# Patient Record
Sex: FEMALE | Race: WHITE | Employment: OTHER | URBAN - METROPOLITAN AREA
[De-identification: names, ages, dates, MRNs, and addresses within clinical notes are randomized per-mention and may not be internally consistent; named-entity substitution may affect disease eponyms.]

---

## 2017-01-06 ENCOUNTER — TELEPHONE (OUTPATIENT)
Dept: CARDIOLOGY CLINIC | Age: 82
End: 2017-01-06

## 2017-01-09 ENCOUNTER — OFFICE VISIT (OUTPATIENT)
Dept: FAMILY MEDICINE CLINIC | Age: 82
End: 2017-01-09

## 2017-01-09 VITALS
DIASTOLIC BLOOD PRESSURE: 72 MMHG | HEIGHT: 63 IN | WEIGHT: 184 LBS | BODY MASS INDEX: 32.6 KG/M2 | SYSTOLIC BLOOD PRESSURE: 144 MMHG | HEART RATE: 70 BPM

## 2017-01-09 DIAGNOSIS — M79.7 FIBROMYALGIA: ICD-10-CM

## 2017-01-09 DIAGNOSIS — J44.9 CHRONIC OBSTRUCTIVE PULMONARY DISEASE, UNSPECIFIED COPD TYPE (HCC): Primary | ICD-10-CM

## 2017-01-09 DIAGNOSIS — G89.4 CHRONIC PAIN SYNDROME: ICD-10-CM

## 2017-01-09 DIAGNOSIS — M54.16 LUMBAR RADICULOPATHY: ICD-10-CM

## 2017-01-09 DIAGNOSIS — G62.9 NEUROPATHY: ICD-10-CM

## 2017-01-09 DIAGNOSIS — M16.0 PRIMARY OSTEOARTHRITIS OF BOTH HIPS: ICD-10-CM

## 2017-01-09 DIAGNOSIS — I10 ESSENTIAL HYPERTENSION: ICD-10-CM

## 2017-01-09 DIAGNOSIS — Z23 NEED FOR INFLUENZA VACCINATION: ICD-10-CM

## 2017-01-09 DIAGNOSIS — M25.562 ACUTE PAIN OF LEFT KNEE: ICD-10-CM

## 2017-01-09 PROCEDURE — 99214 OFFICE O/P EST MOD 30 MIN: CPT | Performed by: NURSE PRACTITIONER

## 2017-01-09 PROCEDURE — G0008 ADMIN INFLUENZA VIRUS VAC: HCPCS | Performed by: NURSE PRACTITIONER

## 2017-01-09 PROCEDURE — 90688 IIV4 VACCINE SPLT 0.5 ML IM: CPT | Performed by: NURSE PRACTITIONER

## 2017-01-09 RX ORDER — OXYCODONE AND ACETAMINOPHEN 7.5; 325 MG/1; MG/1
1 TABLET ORAL EVERY 6 HOURS PRN
Qty: 60 TABLET | Refills: 0 | Status: SHIPPED | OUTPATIENT
Start: 2017-01-11 | End: 2017-04-07 | Stop reason: SDUPTHER

## 2017-01-09 RX ORDER — METOPROLOL SUCCINATE 100 MG/1
TABLET, EXTENDED RELEASE ORAL
Qty: 180 TABLET | Refills: 0 | Status: SHIPPED | OUTPATIENT
Start: 2017-01-09 | End: 2017-04-07 | Stop reason: SDUPTHER

## 2017-01-09 RX ORDER — OXYCODONE AND ACETAMINOPHEN 7.5; 325 MG/1; MG/1
1 TABLET ORAL EVERY 6 HOURS PRN
Qty: 60 TABLET | Refills: 0 | Status: SHIPPED | OUTPATIENT
Start: 2017-03-12 | End: 2017-04-07 | Stop reason: SDUPTHER

## 2017-01-09 RX ORDER — OXYCODONE HCL 10 MG/1
10 TABLET, FILM COATED, EXTENDED RELEASE ORAL EVERY 12 HOURS
Qty: 60 TABLET | Refills: 0 | Status: SHIPPED | OUTPATIENT
Start: 2017-02-11 | End: 2017-04-07 | Stop reason: SDUPTHER

## 2017-01-09 RX ORDER — ALBUTEROL SULFATE 90 UG/1
2 AEROSOL, METERED RESPIRATORY (INHALATION) EVERY 6 HOURS PRN
Qty: 1 INHALER | Refills: 2 | Status: SHIPPED | OUTPATIENT
Start: 2017-01-09 | End: 2018-03-13 | Stop reason: SDUPTHER

## 2017-01-09 RX ORDER — OXYCODONE AND ACETAMINOPHEN 7.5; 325 MG/1; MG/1
1 TABLET ORAL EVERY 6 HOURS PRN
Qty: 60 TABLET | Refills: 0 | Status: SHIPPED | OUTPATIENT
Start: 2017-02-10 | End: 2017-04-07 | Stop reason: SDUPTHER

## 2017-01-09 RX ORDER — LISINOPRIL 40 MG/1
20 TABLET ORAL DAILY
Qty: 90 TABLET | Refills: 0 | Status: SHIPPED | OUTPATIENT
Start: 2017-01-09 | End: 2017-04-07 | Stop reason: SDUPTHER

## 2017-01-09 RX ORDER — OXYCODONE HCL 10 MG/1
10 TABLET, FILM COATED, EXTENDED RELEASE ORAL EVERY 12 HOURS
Qty: 60 TABLET | Refills: 0 | Status: SHIPPED | OUTPATIENT
Start: 2017-01-11 | End: 2017-04-07 | Stop reason: SDUPTHER

## 2017-01-09 RX ORDER — OXYCODONE HCL 10 MG/1
10 TABLET, FILM COATED, EXTENDED RELEASE ORAL EVERY 12 HOURS
Qty: 60 TABLET | Refills: 0 | Status: SHIPPED | OUTPATIENT
Start: 2017-03-12 | End: 2017-04-07 | Stop reason: SDUPTHER

## 2017-01-09 ASSESSMENT — ENCOUNTER SYMPTOMS
SHORTNESS OF BREATH: 0
RHINORRHEA: 0
EYES NEGATIVE: 1
NAUSEA: 0
ABDOMINAL PAIN: 0
CHEST TIGHTNESS: 0
COUGH: 0
WHEEZING: 0
BACK PAIN: 1
CHOKING: 0
EYE ITCHING: 0
CONSTIPATION: 1
DIARRHEA: 0
ALLERGIC/IMMUNOLOGIC NEGATIVE: 1
PHOTOPHOBIA: 0
EYE DISCHARGE: 0
VOMITING: 0
SINUS PRESSURE: 0
EYE REDNESS: 0
SORE THROAT: 0
EYE PAIN: 0

## 2017-01-12 ENCOUNTER — TELEPHONE (OUTPATIENT)
Dept: CARDIOLOGY CLINIC | Age: 82
End: 2017-01-12

## 2017-01-12 DIAGNOSIS — I48.20 CHRONIC ATRIAL FIBRILLATION (HCC): ICD-10-CM

## 2017-01-12 LAB
INR BLD: 2.8 (ref 0.85–1.16)
PROTHROMBIN TIME: 32.6 SEC (ref 9.8–13)

## 2017-01-13 ENCOUNTER — OFFICE VISIT (OUTPATIENT)
Dept: ORTHOPEDIC SURGERY | Age: 82
End: 2017-01-13

## 2017-01-13 VITALS
HEIGHT: 63 IN | WEIGHT: 184 LBS | DIASTOLIC BLOOD PRESSURE: 84 MMHG | RESPIRATION RATE: 16 BRPM | BODY MASS INDEX: 32.6 KG/M2 | SYSTOLIC BLOOD PRESSURE: 138 MMHG | HEART RATE: 70 BPM

## 2017-01-13 DIAGNOSIS — S72.352A CLOSED DISPLACED COMMINUTED FRACTURE OF SHAFT OF LEFT FEMUR, INITIAL ENCOUNTER (HCC): ICD-10-CM

## 2017-01-13 DIAGNOSIS — M17.10 ARTHRITIS OF KNEE: Primary | ICD-10-CM

## 2017-01-13 PROCEDURE — G8419 CALC BMI OUT NRM PARAM NOF/U: HCPCS | Performed by: ORTHOPAEDIC SURGERY

## 2017-01-13 PROCEDURE — 4040F PNEUMOC VAC/ADMIN/RCVD: CPT | Performed by: ORTHOPAEDIC SURGERY

## 2017-01-13 PROCEDURE — 1123F ACP DISCUSS/DSCN MKR DOCD: CPT | Performed by: ORTHOPAEDIC SURGERY

## 2017-01-13 PROCEDURE — 99214 OFFICE O/P EST MOD 30 MIN: CPT | Performed by: ORTHOPAEDIC SURGERY

## 2017-01-13 PROCEDURE — G8484 FLU IMMUNIZE NO ADMIN: HCPCS | Performed by: ORTHOPAEDIC SURGERY

## 2017-01-13 PROCEDURE — 1090F PRES/ABSN URINE INCON ASSESS: CPT | Performed by: ORTHOPAEDIC SURGERY

## 2017-01-13 PROCEDURE — 1036F TOBACCO NON-USER: CPT | Performed by: ORTHOPAEDIC SURGERY

## 2017-01-13 PROCEDURE — G8400 PT W/DXA NO RESULTS DOC: HCPCS | Performed by: ORTHOPAEDIC SURGERY

## 2017-01-13 PROCEDURE — 73502 X-RAY EXAM HIP UNI 2-3 VIEWS: CPT | Performed by: ORTHOPAEDIC SURGERY

## 2017-01-13 PROCEDURE — 20610 DRAIN/INJ JOINT/BURSA W/O US: CPT | Performed by: ORTHOPAEDIC SURGERY

## 2017-01-13 PROCEDURE — G8428 CUR MEDS NOT DOCUMENT: HCPCS | Performed by: ORTHOPAEDIC SURGERY

## 2017-01-17 ENCOUNTER — TELEPHONE (OUTPATIENT)
Dept: CARDIOLOGY CLINIC | Age: 82
End: 2017-01-17

## 2017-01-17 ENCOUNTER — ANTI-COAG VISIT (OUTPATIENT)
Dept: CARDIOLOGY CLINIC | Age: 82
End: 2017-01-17

## 2017-02-14 ENCOUNTER — HOSPITAL ENCOUNTER (OUTPATIENT)
Dept: CT IMAGING | Age: 82
Discharge: HOME | End: 2017-02-14
Attending: INTERNAL MEDICINE | Admitting: INTERNAL MEDICINE

## 2017-02-14 DIAGNOSIS — K86.2 CYST OF PANCREAS: ICD-10-CM

## 2017-02-14 DIAGNOSIS — K86.3 CYST AND PSEUDOCYST OF PANCREAS: ICD-10-CM

## 2017-02-14 DIAGNOSIS — K86.2 CYST AND PSEUDOCYST OF PANCREAS: ICD-10-CM

## 2017-02-14 LAB
GFR AFRICAN AMERICAN: >60
GFR NON-AFRICAN AMERICAN: >60
PERFORMED ON: ABNORMAL
POC CREATININE: 0.5 MG/DL (ref 0.6–1.2)
POC SAMPLE TYPE: ABNORMAL

## 2017-02-23 DIAGNOSIS — I48.20 CHRONIC ATRIAL FIBRILLATION (HCC): ICD-10-CM

## 2017-02-24 ENCOUNTER — ANTI-COAG VISIT (OUTPATIENT)
Dept: CARDIOLOGY CLINIC | Age: 82
End: 2017-02-24

## 2017-02-24 ENCOUNTER — TELEPHONE (OUTPATIENT)
Dept: CARDIOLOGY CLINIC | Age: 82
End: 2017-02-24

## 2017-02-24 LAB
INR BLD: 2.06 (ref 0.85–1.15)
PROTHROMBIN TIME: 23.3 SEC (ref 9.6–13)

## 2017-03-17 ENCOUNTER — TELEPHONE (OUTPATIENT)
Dept: CARDIOLOGY CLINIC | Age: 82
End: 2017-03-17

## 2017-03-18 DIAGNOSIS — I48.20 CHRONIC ATRIAL FIBRILLATION (HCC): ICD-10-CM

## 2017-03-18 LAB
INR BLD: 1.73 (ref 0.85–1.15)
PROTHROMBIN TIME: 19.5 SEC (ref 9.6–13)

## 2017-03-20 ENCOUNTER — ANTI-COAG VISIT (OUTPATIENT)
Dept: CARDIOLOGY CLINIC | Age: 82
End: 2017-03-20

## 2017-03-20 ENCOUNTER — TELEPHONE (OUTPATIENT)
Dept: CARDIOLOGY CLINIC | Age: 82
End: 2017-03-20

## 2017-03-20 RX ORDER — WARFARIN SODIUM 5 MG/1
TABLET ORAL
Qty: 100 TABLET | Refills: 1 | Status: SHIPPED | OUTPATIENT
Start: 2017-03-20 | End: 2017-04-07 | Stop reason: SDUPTHER

## 2017-03-21 RX ORDER — WARFARIN SODIUM 5 MG/1
TABLET ORAL
Qty: 90 TABLET | Refills: 3 | Status: SHIPPED | OUTPATIENT
Start: 2017-03-21 | End: 2018-05-18 | Stop reason: SDUPTHER

## 2017-03-23 DIAGNOSIS — I48.20 CHRONIC ATRIAL FIBRILLATION (HCC): ICD-10-CM

## 2017-03-23 LAB
INR BLD: 2.28 (ref 0.85–1.15)
PROTHROMBIN TIME: 25.8 SEC (ref 9.6–13)

## 2017-03-24 ENCOUNTER — ANTI-COAG VISIT (OUTPATIENT)
Dept: CARDIOLOGY CLINIC | Age: 82
End: 2017-03-24

## 2017-03-24 ENCOUNTER — TELEPHONE (OUTPATIENT)
Dept: CARDIOLOGY CLINIC | Age: 82
End: 2017-03-24

## 2017-04-07 ENCOUNTER — OFFICE VISIT (OUTPATIENT)
Dept: FAMILY MEDICINE CLINIC | Age: 82
End: 2017-04-07

## 2017-04-07 VITALS
OXYGEN SATURATION: 97 % | SYSTOLIC BLOOD PRESSURE: 120 MMHG | HEART RATE: 70 BPM | DIASTOLIC BLOOD PRESSURE: 70 MMHG | BODY MASS INDEX: 32.78 KG/M2 | WEIGHT: 185 LBS | HEIGHT: 63 IN

## 2017-04-07 DIAGNOSIS — M25.511 CHRONIC PAIN OF BOTH SHOULDERS: ICD-10-CM

## 2017-04-07 DIAGNOSIS — G89.4 CHRONIC PAIN SYNDROME: ICD-10-CM

## 2017-04-07 DIAGNOSIS — I48.91 ATRIAL FIBRILLATION, UNSPECIFIED TYPE (HCC): Primary | ICD-10-CM

## 2017-04-07 DIAGNOSIS — M25.512 CHRONIC PAIN OF BOTH SHOULDERS: ICD-10-CM

## 2017-04-07 DIAGNOSIS — M79.7 FIBROMYALGIA: ICD-10-CM

## 2017-04-07 DIAGNOSIS — G89.29 CHRONIC PAIN OF BOTH SHOULDERS: ICD-10-CM

## 2017-04-07 DIAGNOSIS — I10 ESSENTIAL HYPERTENSION: ICD-10-CM

## 2017-04-07 DIAGNOSIS — M16.0 PRIMARY OSTEOARTHRITIS OF BOTH HIPS: ICD-10-CM

## 2017-04-07 DIAGNOSIS — G62.9 NEUROPATHY: ICD-10-CM

## 2017-04-07 DIAGNOSIS — M54.16 LUMBAR RADICULOPATHY: ICD-10-CM

## 2017-04-07 DIAGNOSIS — R73.02 IMPAIRED GLUCOSE TOLERANCE: ICD-10-CM

## 2017-04-07 LAB
A/G RATIO: 1.3 (ref 1.1–2.2)
ALBUMIN SERPL-MCNC: 3.9 G/DL (ref 3.4–5)
ALP BLD-CCNC: 65 U/L (ref 40–129)
ALT SERPL-CCNC: 10 U/L (ref 10–40)
ANION GAP SERPL CALCULATED.3IONS-SCNC: 17 MMOL/L (ref 3–16)
AST SERPL-CCNC: 15 U/L (ref 15–37)
BILIRUB SERPL-MCNC: 0.5 MG/DL (ref 0–1)
BUN BLDV-MCNC: 15 MG/DL (ref 7–20)
CALCIUM SERPL-MCNC: 9.6 MG/DL (ref 8.3–10.6)
CHLORIDE BLD-SCNC: 102 MMOL/L (ref 99–110)
CO2: 26 MMOL/L (ref 21–32)
CREAT SERPL-MCNC: 0.7 MG/DL (ref 0.6–1.2)
GFR AFRICAN AMERICAN: >60
GFR NON-AFRICAN AMERICAN: >60
GLOBULIN: 3 G/DL
GLUCOSE BLD-MCNC: 122 MG/DL (ref 70–99)
POTASSIUM SERPL-SCNC: 4.7 MMOL/L (ref 3.5–5.1)
SODIUM BLD-SCNC: 145 MMOL/L (ref 136–145)
TOTAL PROTEIN: 6.9 G/DL (ref 6.4–8.2)

## 2017-04-07 PROCEDURE — 1090F PRES/ABSN URINE INCON ASSESS: CPT | Performed by: NURSE PRACTITIONER

## 2017-04-07 PROCEDURE — G8417 CALC BMI ABV UP PARAM F/U: HCPCS | Performed by: NURSE PRACTITIONER

## 2017-04-07 PROCEDURE — 1123F ACP DISCUSS/DSCN MKR DOCD: CPT | Performed by: NURSE PRACTITIONER

## 2017-04-07 PROCEDURE — 1036F TOBACCO NON-USER: CPT | Performed by: NURSE PRACTITIONER

## 2017-04-07 PROCEDURE — 99214 OFFICE O/P EST MOD 30 MIN: CPT | Performed by: NURSE PRACTITIONER

## 2017-04-07 PROCEDURE — G8427 DOCREV CUR MEDS BY ELIG CLIN: HCPCS | Performed by: NURSE PRACTITIONER

## 2017-04-07 PROCEDURE — 4040F PNEUMOC VAC/ADMIN/RCVD: CPT | Performed by: NURSE PRACTITIONER

## 2017-04-07 PROCEDURE — 36415 COLL VENOUS BLD VENIPUNCTURE: CPT | Performed by: NURSE PRACTITIONER

## 2017-04-07 PROCEDURE — G8400 PT W/DXA NO RESULTS DOC: HCPCS | Performed by: NURSE PRACTITIONER

## 2017-04-07 RX ORDER — OXYCODONE AND ACETAMINOPHEN 7.5; 325 MG/1; MG/1
1 TABLET ORAL EVERY 6 HOURS PRN
Qty: 60 TABLET | Refills: 0 | Status: SHIPPED | OUTPATIENT
Start: 2017-06-10 | End: 2017-07-10 | Stop reason: SDUPTHER

## 2017-04-07 RX ORDER — OXYCODONE HCL 10 MG/1
10 TABLET, FILM COATED, EXTENDED RELEASE ORAL EVERY 12 HOURS
Qty: 60 TABLET | Refills: 0 | Status: SHIPPED | OUTPATIENT
Start: 2017-05-12 | End: 2017-05-10 | Stop reason: SDUPTHER

## 2017-04-07 RX ORDER — LISINOPRIL 40 MG/1
20 TABLET ORAL DAILY
Qty: 90 TABLET | Refills: 0 | Status: SHIPPED | OUTPATIENT
Start: 2017-04-07 | End: 2018-01-05 | Stop reason: SDUPTHER

## 2017-04-07 RX ORDER — OXYCODONE AND ACETAMINOPHEN 7.5; 325 MG/1; MG/1
1 TABLET ORAL EVERY 6 HOURS PRN
Qty: 60 TABLET | Refills: 0 | Status: SHIPPED | OUTPATIENT
Start: 2017-05-11 | End: 2017-05-10 | Stop reason: SDUPTHER

## 2017-04-07 RX ORDER — PREDNISONE 10 MG/1
TABLET ORAL
Qty: 10 TABLET | Refills: 0 | Status: SHIPPED | OUTPATIENT
Start: 2017-04-07 | End: 2017-05-10 | Stop reason: ALTCHOICE

## 2017-04-07 RX ORDER — METOPROLOL SUCCINATE 100 MG/1
TABLET, EXTENDED RELEASE ORAL
Qty: 180 TABLET | Refills: 0 | Status: SHIPPED | OUTPATIENT
Start: 2017-04-07 | End: 2017-11-03 | Stop reason: SDUPTHER

## 2017-04-07 RX ORDER — OXYCODONE HCL 10 MG/1
10 TABLET, FILM COATED, EXTENDED RELEASE ORAL EVERY 12 HOURS
Qty: 60 TABLET | Refills: 0 | Status: SHIPPED | OUTPATIENT
Start: 2017-04-12 | End: 2017-05-10 | Stop reason: SDUPTHER

## 2017-04-07 RX ORDER — OXYCODONE HCL 10 MG/1
10 TABLET, FILM COATED, EXTENDED RELEASE ORAL EVERY 12 HOURS
Qty: 60 TABLET | Refills: 0 | Status: SHIPPED | OUTPATIENT
Start: 2017-06-11 | End: 2017-07-10 | Stop reason: SDUPTHER

## 2017-04-07 RX ORDER — OXYCODONE AND ACETAMINOPHEN 7.5; 325 MG/1; MG/1
1 TABLET ORAL EVERY 6 HOURS PRN
Qty: 60 TABLET | Refills: 0 | Status: SHIPPED | OUTPATIENT
Start: 2017-04-11 | End: 2017-05-10 | Stop reason: SDUPTHER

## 2017-04-07 ASSESSMENT — ENCOUNTER SYMPTOMS
NAUSEA: 0
COUGH: 0
SHORTNESS OF BREATH: 0
PHOTOPHOBIA: 0
EYE DISCHARGE: 0
EYES NEGATIVE: 1
SORE THROAT: 0
EYE REDNESS: 0
CHEST TIGHTNESS: 0
RHINORRHEA: 0
SINUS PRESSURE: 0
CHOKING: 0
ALLERGIC/IMMUNOLOGIC NEGATIVE: 1
VOMITING: 0
BACK PAIN: 1
ABDOMINAL PAIN: 0
DIARRHEA: 0
EYE ITCHING: 0
EYE PAIN: 0
CONSTIPATION: 1
WHEEZING: 0

## 2017-04-08 LAB
ESTIMATED AVERAGE GLUCOSE: 122.6 MG/DL
HBA1C MFR BLD: 5.9 %

## 2017-04-10 ENCOUNTER — TELEPHONE (OUTPATIENT)
Dept: FAMILY MEDICINE CLINIC | Age: 82
End: 2017-04-10

## 2017-04-11 ENCOUNTER — TELEPHONE (OUTPATIENT)
Dept: FAMILY MEDICINE CLINIC | Age: 82
End: 2017-04-11

## 2017-04-11 RX ORDER — SULFAMETHOXAZOLE AND TRIMETHOPRIM 800; 160 MG/1; MG/1
1 TABLET ORAL 2 TIMES DAILY
Qty: 10 TABLET | Refills: 0 | Status: SHIPPED | OUTPATIENT
Start: 2017-04-11 | End: 2017-09-07 | Stop reason: SDUPTHER

## 2017-05-03 DIAGNOSIS — I48.20 CHRONIC ATRIAL FIBRILLATION (HCC): ICD-10-CM

## 2017-05-03 LAB
INR BLD: 3.11 (ref 0.85–1.15)
PROTHROMBIN TIME: 35.1 SEC (ref 9.6–13)

## 2017-05-05 ENCOUNTER — ANTI-COAG VISIT (OUTPATIENT)
Dept: CARDIOLOGY CLINIC | Age: 82
End: 2017-05-05

## 2017-05-05 ENCOUNTER — TELEPHONE (OUTPATIENT)
Dept: CARDIOLOGY CLINIC | Age: 82
End: 2017-05-05

## 2017-05-10 ENCOUNTER — OFFICE VISIT (OUTPATIENT)
Dept: CARDIOLOGY CLINIC | Age: 82
End: 2017-05-10

## 2017-05-10 VITALS
HEART RATE: 78 BPM | OXYGEN SATURATION: 98 % | BODY MASS INDEX: 32.96 KG/M2 | SYSTOLIC BLOOD PRESSURE: 118 MMHG | DIASTOLIC BLOOD PRESSURE: 70 MMHG | HEIGHT: 63 IN | WEIGHT: 186 LBS

## 2017-05-10 DIAGNOSIS — I48.91 ATRIAL FIBRILLATION, UNSPECIFIED TYPE (HCC): Primary | ICD-10-CM

## 2017-05-10 DIAGNOSIS — I10 ESSENTIAL HYPERTENSION: ICD-10-CM

## 2017-05-10 DIAGNOSIS — R06.02 SOB (SHORTNESS OF BREATH): ICD-10-CM

## 2017-05-10 PROCEDURE — 93000 ELECTROCARDIOGRAM COMPLETE: CPT | Performed by: INTERNAL MEDICINE

## 2017-05-10 PROCEDURE — 4040F PNEUMOC VAC/ADMIN/RCVD: CPT | Performed by: INTERNAL MEDICINE

## 2017-05-10 PROCEDURE — 1123F ACP DISCUSS/DSCN MKR DOCD: CPT | Performed by: INTERNAL MEDICINE

## 2017-05-10 PROCEDURE — 1036F TOBACCO NON-USER: CPT | Performed by: INTERNAL MEDICINE

## 2017-05-10 PROCEDURE — G8400 PT W/DXA NO RESULTS DOC: HCPCS | Performed by: INTERNAL MEDICINE

## 2017-05-10 PROCEDURE — 99215 OFFICE O/P EST HI 40 MIN: CPT | Performed by: INTERNAL MEDICINE

## 2017-05-10 PROCEDURE — G8427 DOCREV CUR MEDS BY ELIG CLIN: HCPCS | Performed by: INTERNAL MEDICINE

## 2017-05-10 PROCEDURE — 1090F PRES/ABSN URINE INCON ASSESS: CPT | Performed by: INTERNAL MEDICINE

## 2017-05-10 PROCEDURE — G8417 CALC BMI ABV UP PARAM F/U: HCPCS | Performed by: INTERNAL MEDICINE

## 2017-05-12 DIAGNOSIS — I48.20 CHRONIC ATRIAL FIBRILLATION (HCC): ICD-10-CM

## 2017-05-12 LAB
INR BLD: 1.24 (ref 0.85–1.15)
PROTHROMBIN TIME: 14 SEC (ref 9.6–13)

## 2017-05-16 ENCOUNTER — ANTI-COAG VISIT (OUTPATIENT)
Dept: CARDIOLOGY CLINIC | Age: 82
End: 2017-05-16

## 2017-05-16 ENCOUNTER — OFFICE VISIT (OUTPATIENT)
Dept: FAMILY MEDICINE CLINIC | Age: 82
End: 2017-05-16

## 2017-05-16 VITALS
DIASTOLIC BLOOD PRESSURE: 84 MMHG | HEART RATE: 100 BPM | HEIGHT: 63 IN | WEIGHT: 187 LBS | OXYGEN SATURATION: 95 % | BODY MASS INDEX: 33.13 KG/M2 | SYSTOLIC BLOOD PRESSURE: 136 MMHG

## 2017-05-16 DIAGNOSIS — Z78.0 ASYMPTOMATIC MENOPAUSAL STATE: ICD-10-CM

## 2017-05-16 DIAGNOSIS — Z12.11 SCREENING FOR COLORECTAL CANCER: ICD-10-CM

## 2017-05-16 DIAGNOSIS — S72.002G: ICD-10-CM

## 2017-05-16 DIAGNOSIS — Z00.00 MEDICARE ANNUAL WELLNESS VISIT, INITIAL: Primary | ICD-10-CM

## 2017-05-16 DIAGNOSIS — M16.0 PRIMARY OSTEOARTHRITIS OF BOTH HIPS: ICD-10-CM

## 2017-05-16 DIAGNOSIS — Z13.6 SCREENING FOR CARDIOVASCULAR CONDITION: ICD-10-CM

## 2017-05-16 DIAGNOSIS — Z12.31 ENCOUNTER FOR SCREENING MAMMOGRAM FOR BREAST CANCER: ICD-10-CM

## 2017-05-16 DIAGNOSIS — Z00.00 ROUTINE GENERAL MEDICAL EXAMINATION AT A HEALTH CARE FACILITY: ICD-10-CM

## 2017-05-16 DIAGNOSIS — Z12.12 SCREENING FOR COLORECTAL CANCER: ICD-10-CM

## 2017-05-16 DIAGNOSIS — Z13.1 SCREENING FOR DIABETES MELLITUS (DM): ICD-10-CM

## 2017-05-16 PROCEDURE — G0438 PPPS, INITIAL VISIT: HCPCS | Performed by: NURSE PRACTITIONER

## 2017-05-16 ASSESSMENT — ANXIETY QUESTIONNAIRES: GAD7 TOTAL SCORE: 0

## 2017-05-16 ASSESSMENT — PATIENT HEALTH QUESTIONNAIRE - PHQ9: SUM OF ALL RESPONSES TO PHQ QUESTIONS 1-9: 0

## 2017-05-16 ASSESSMENT — LIFESTYLE VARIABLES: HOW OFTEN DO YOU HAVE A DRINK CONTAINING ALCOHOL: 0

## 2017-05-23 DIAGNOSIS — I48.20 CHRONIC ATRIAL FIBRILLATION (HCC): ICD-10-CM

## 2017-05-23 LAB
INR BLD: 3.03 (ref 0.85–1.15)
PROTHROMBIN TIME: 34.2 SEC (ref 9.6–13)

## 2017-05-24 ENCOUNTER — ANTI-COAG VISIT (OUTPATIENT)
Dept: CARDIOLOGY CLINIC | Age: 82
End: 2017-05-24

## 2017-06-12 ENCOUNTER — TELEPHONE (OUTPATIENT)
Dept: CARDIOLOGY CLINIC | Age: 82
End: 2017-06-12

## 2017-06-12 DIAGNOSIS — I48.91 ATRIAL FIBRILLATION, UNSPECIFIED TYPE (HCC): Primary | ICD-10-CM

## 2017-06-12 DIAGNOSIS — I48.91 ATRIAL FIBRILLATION, UNSPECIFIED TYPE (HCC): ICD-10-CM

## 2017-06-12 LAB
INR BLD: 3.07 (ref 0.85–1.15)
PROTHROMBIN TIME: 34.7 SEC (ref 9.6–13)

## 2017-06-13 ENCOUNTER — ANTI-COAG VISIT (OUTPATIENT)
Dept: CARDIOLOGY CLINIC | Age: 82
End: 2017-06-13

## 2017-06-26 ENCOUNTER — TELEPHONE (OUTPATIENT)
Dept: FAMILY MEDICINE CLINIC | Age: 82
End: 2017-06-26

## 2017-07-10 ENCOUNTER — OFFICE VISIT (OUTPATIENT)
Dept: FAMILY MEDICINE CLINIC | Age: 82
End: 2017-07-10

## 2017-07-10 VITALS
HEART RATE: 86 BPM | DIASTOLIC BLOOD PRESSURE: 84 MMHG | HEIGHT: 63 IN | BODY MASS INDEX: 32.78 KG/M2 | WEIGHT: 185 LBS | SYSTOLIC BLOOD PRESSURE: 124 MMHG

## 2017-07-10 DIAGNOSIS — M16.0 PRIMARY OSTEOARTHRITIS OF BOTH HIPS: ICD-10-CM

## 2017-07-10 DIAGNOSIS — G62.9 NEUROPATHY: ICD-10-CM

## 2017-07-10 DIAGNOSIS — G89.4 CHRONIC PAIN SYNDROME: ICD-10-CM

## 2017-07-10 DIAGNOSIS — I50.32 CHF (CONGESTIVE HEART FAILURE), NYHA CLASS I, CHRONIC, DIASTOLIC (HCC): ICD-10-CM

## 2017-07-10 DIAGNOSIS — M54.16 LUMBAR RADICULOPATHY: ICD-10-CM

## 2017-07-10 DIAGNOSIS — I10 ESSENTIAL HYPERTENSION: ICD-10-CM

## 2017-07-10 DIAGNOSIS — M79.7 FIBROMYALGIA: ICD-10-CM

## 2017-07-10 PROCEDURE — 4040F PNEUMOC VAC/ADMIN/RCVD: CPT | Performed by: FAMILY MEDICINE

## 2017-07-10 PROCEDURE — 90670 PCV13 VACCINE IM: CPT | Performed by: FAMILY MEDICINE

## 2017-07-10 PROCEDURE — G8400 PT W/DXA NO RESULTS DOC: HCPCS | Performed by: FAMILY MEDICINE

## 2017-07-10 PROCEDURE — 99214 OFFICE O/P EST MOD 30 MIN: CPT | Performed by: FAMILY MEDICINE

## 2017-07-10 PROCEDURE — 1123F ACP DISCUSS/DSCN MKR DOCD: CPT | Performed by: FAMILY MEDICINE

## 2017-07-10 PROCEDURE — 1090F PRES/ABSN URINE INCON ASSESS: CPT | Performed by: FAMILY MEDICINE

## 2017-07-10 PROCEDURE — G8417 CALC BMI ABV UP PARAM F/U: HCPCS | Performed by: FAMILY MEDICINE

## 2017-07-10 PROCEDURE — G8427 DOCREV CUR MEDS BY ELIG CLIN: HCPCS | Performed by: FAMILY MEDICINE

## 2017-07-10 PROCEDURE — 1036F TOBACCO NON-USER: CPT | Performed by: FAMILY MEDICINE

## 2017-07-10 PROCEDURE — G0009 ADMIN PNEUMOCOCCAL VACCINE: HCPCS | Performed by: FAMILY MEDICINE

## 2017-07-10 RX ORDER — OXYCODONE AND ACETAMINOPHEN 7.5; 325 MG/1; MG/1
1 TABLET ORAL 2 TIMES DAILY
Qty: 60 TABLET | Refills: 0 | Status: SHIPPED | OUTPATIENT
Start: 2017-07-10 | End: 2017-10-02 | Stop reason: SDUPTHER

## 2017-07-10 RX ORDER — OXYCODONE HCL 10 MG/1
10 TABLET, FILM COATED, EXTENDED RELEASE ORAL EVERY 12 HOURS
Qty: 60 TABLET | Refills: 0 | Status: SHIPPED | OUTPATIENT
Start: 2017-09-09 | End: 2017-10-09

## 2017-07-10 RX ORDER — LISINOPRIL 40 MG/1
20 TABLET ORAL DAILY
Qty: 90 TABLET | Refills: 0 | Status: CANCELLED | OUTPATIENT
Start: 2017-07-10

## 2017-07-10 RX ORDER — OXYCODONE HCL 10 MG/1
10 TABLET, FILM COATED, EXTENDED RELEASE ORAL EVERY 12 HOURS
Qty: 60 TABLET | Refills: 0 | Status: SHIPPED | OUTPATIENT
Start: 2017-07-11 | End: 2017-10-02 | Stop reason: SDUPTHER

## 2017-07-10 RX ORDER — OXYCODONE HCL 10 MG/1
10 TABLET, FILM COATED, EXTENDED RELEASE ORAL EVERY 12 HOURS
Qty: 60 TABLET | Refills: 0 | Status: SHIPPED | OUTPATIENT
Start: 2017-08-10 | End: 2017-10-02 | Stop reason: SDUPTHER

## 2017-07-10 RX ORDER — WARFARIN SODIUM 5 MG/1
TABLET ORAL
Qty: 90 TABLET | Refills: 3 | Status: CANCELLED | OUTPATIENT
Start: 2017-07-10

## 2017-07-10 RX ORDER — METOPROLOL SUCCINATE 100 MG/1
TABLET, EXTENDED RELEASE ORAL
Qty: 180 TABLET | Refills: 0 | Status: CANCELLED | OUTPATIENT
Start: 2017-07-10

## 2017-07-10 RX ORDER — GABAPENTIN 300 MG/1
300 CAPSULE ORAL 3 TIMES DAILY
Qty: 90 CAPSULE | Refills: 3 | Status: SHIPPED | OUTPATIENT
Start: 2017-07-10 | End: 2017-08-17 | Stop reason: ALTCHOICE

## 2017-07-10 RX ORDER — OXYCODONE AND ACETAMINOPHEN 7.5; 325 MG/1; MG/1
1 TABLET ORAL 2 TIMES DAILY
Qty: 60 TABLET | Refills: 0 | Status: SHIPPED | OUTPATIENT
Start: 2017-07-10 | End: 2017-07-30

## 2017-07-10 RX ORDER — OXYCODONE AND ACETAMINOPHEN 7.5; 325 MG/1; MG/1
1 TABLET ORAL 2 TIMES DAILY
Qty: 60 TABLET | Refills: 0 | Status: SHIPPED | OUTPATIENT
Start: 2017-07-10 | End: 2017-08-09

## 2017-07-10 ASSESSMENT — ENCOUNTER SYMPTOMS
BACK PAIN: 1
CONSTIPATION: 1

## 2017-07-18 DIAGNOSIS — I48.91 ATRIAL FIBRILLATION, UNSPECIFIED TYPE (HCC): ICD-10-CM

## 2017-07-18 LAB
INR BLD: 2.61 (ref 0.85–1.15)
PROTHROMBIN TIME: 29.5 SEC (ref 9.6–13)

## 2017-07-19 ENCOUNTER — ANTI-COAG VISIT (OUTPATIENT)
Dept: CARDIOLOGY CLINIC | Age: 82
End: 2017-07-19

## 2017-07-19 ENCOUNTER — TELEPHONE (OUTPATIENT)
Dept: CARDIOLOGY CLINIC | Age: 82
End: 2017-07-19

## 2017-08-17 ENCOUNTER — OFFICE VISIT (OUTPATIENT)
Dept: FAMILY MEDICINE CLINIC | Age: 82
End: 2017-08-17

## 2017-08-17 VITALS
RESPIRATION RATE: 16 BRPM | WEIGHT: 189 LBS | OXYGEN SATURATION: 97 % | DIASTOLIC BLOOD PRESSURE: 72 MMHG | SYSTOLIC BLOOD PRESSURE: 144 MMHG | BODY MASS INDEX: 34.57 KG/M2 | HEART RATE: 90 BPM

## 2017-08-17 DIAGNOSIS — R45.89 DEPRESSED MOOD: ICD-10-CM

## 2017-08-17 DIAGNOSIS — J44.0 COPD (CHRONIC OBSTRUCTIVE PULMONARY DISEASE) WITH ACUTE BRONCHITIS (HCC): Primary | ICD-10-CM

## 2017-08-17 DIAGNOSIS — J20.9 COPD (CHRONIC OBSTRUCTIVE PULMONARY DISEASE) WITH ACUTE BRONCHITIS (HCC): Primary | ICD-10-CM

## 2017-08-17 PROCEDURE — G8427 DOCREV CUR MEDS BY ELIG CLIN: HCPCS | Performed by: FAMILY MEDICINE

## 2017-08-17 PROCEDURE — 1123F ACP DISCUSS/DSCN MKR DOCD: CPT | Performed by: FAMILY MEDICINE

## 2017-08-17 PROCEDURE — 3023F SPIROM DOC REV: CPT | Performed by: FAMILY MEDICINE

## 2017-08-17 PROCEDURE — 1090F PRES/ABSN URINE INCON ASSESS: CPT | Performed by: FAMILY MEDICINE

## 2017-08-17 PROCEDURE — 99213 OFFICE O/P EST LOW 20 MIN: CPT | Performed by: FAMILY MEDICINE

## 2017-08-17 PROCEDURE — 1036F TOBACCO NON-USER: CPT | Performed by: FAMILY MEDICINE

## 2017-08-17 PROCEDURE — 4040F PNEUMOC VAC/ADMIN/RCVD: CPT | Performed by: FAMILY MEDICINE

## 2017-08-17 PROCEDURE — G8926 SPIRO NO PERF OR DOC: HCPCS | Performed by: FAMILY MEDICINE

## 2017-08-17 PROCEDURE — G8417 CALC BMI ABV UP PARAM F/U: HCPCS | Performed by: FAMILY MEDICINE

## 2017-08-17 PROCEDURE — G8400 PT W/DXA NO RESULTS DOC: HCPCS | Performed by: FAMILY MEDICINE

## 2017-08-17 ASSESSMENT — ENCOUNTER SYMPTOMS
CHOKING: 0
SORE THROAT: 0
RHINORRHEA: 0
COUGH: 0
SINUS PRESSURE: 0
TROUBLE SWALLOWING: 0
CHEST TIGHTNESS: 0
WHEEZING: 0
SHORTNESS OF BREATH: 0

## 2017-08-30 ENCOUNTER — TELEPHONE (OUTPATIENT)
Dept: CARDIOLOGY CLINIC | Age: 82
End: 2017-08-30

## 2017-08-31 ENCOUNTER — ANTI-COAG VISIT (OUTPATIENT)
Dept: CARDIOLOGY CLINIC | Age: 82
End: 2017-08-31

## 2017-08-31 DIAGNOSIS — I48.91 ATRIAL FIBRILLATION, UNSPECIFIED TYPE (HCC): ICD-10-CM

## 2017-08-31 LAB
INR BLD: 2.21 (ref 0.85–1.15)
PROTHROMBIN TIME: 25 SEC (ref 9.6–13)

## 2017-09-07 ENCOUNTER — TELEPHONE (OUTPATIENT)
Dept: FAMILY MEDICINE CLINIC | Age: 82
End: 2017-09-07

## 2017-09-07 RX ORDER — SULFAMETHOXAZOLE AND TRIMETHOPRIM 800; 160 MG/1; MG/1
1 TABLET ORAL EVERY 12 HOURS
Qty: 14 TABLET | Refills: 0 | Status: SHIPPED | OUTPATIENT
Start: 2017-09-07 | End: 2017-09-14

## 2017-09-15 ENCOUNTER — HOSPITAL ENCOUNTER (OUTPATIENT)
Dept: WOMENS IMAGING | Age: 82
Discharge: HOME | End: 2017-09-15
Attending: FAMILY MEDICINE | Admitting: FAMILY MEDICINE

## 2017-09-15 DIAGNOSIS — Z12.31 ENCOUNTER FOR SCREENING MAMMOGRAM FOR BREAST CANCER: ICD-10-CM

## 2017-09-15 DIAGNOSIS — Z78.0 ASYMPTOMATIC MENOPAUSAL STATE: ICD-10-CM

## 2017-09-22 ENCOUNTER — TELEPHONE (OUTPATIENT)
Dept: FAMILY MEDICINE CLINIC | Age: 82
End: 2017-09-22

## 2017-09-29 ENCOUNTER — TELEPHONE (OUTPATIENT)
Dept: FAMILY MEDICINE CLINIC | Age: 82
End: 2017-09-29

## 2017-10-02 ENCOUNTER — OFFICE VISIT (OUTPATIENT)
Dept: FAMILY MEDICINE CLINIC | Age: 82
End: 2017-10-02

## 2017-10-02 VITALS
HEIGHT: 63 IN | BODY MASS INDEX: 32.96 KG/M2 | HEART RATE: 86 BPM | OXYGEN SATURATION: 95 % | WEIGHT: 186 LBS | DIASTOLIC BLOOD PRESSURE: 72 MMHG | SYSTOLIC BLOOD PRESSURE: 134 MMHG

## 2017-10-02 DIAGNOSIS — M16.0 PRIMARY OSTEOARTHRITIS OF BOTH HIPS: ICD-10-CM

## 2017-10-02 DIAGNOSIS — G62.9 NEUROPATHY: ICD-10-CM

## 2017-10-02 DIAGNOSIS — M54.16 LUMBAR RADICULOPATHY: ICD-10-CM

## 2017-10-02 DIAGNOSIS — I48.91 ATRIAL FIBRILLATION, UNSPECIFIED TYPE (HCC): ICD-10-CM

## 2017-10-02 DIAGNOSIS — M79.7 FIBROMYALGIA: ICD-10-CM

## 2017-10-02 DIAGNOSIS — G89.4 CHRONIC PAIN SYNDROME: ICD-10-CM

## 2017-10-02 LAB
INR BLD: 3.17 (ref 0.85–1.15)
PROTHROMBIN TIME: 35.8 SEC (ref 9.6–13)

## 2017-10-02 PROCEDURE — 4040F PNEUMOC VAC/ADMIN/RCVD: CPT | Performed by: FAMILY MEDICINE

## 2017-10-02 PROCEDURE — G8484 FLU IMMUNIZE NO ADMIN: HCPCS | Performed by: FAMILY MEDICINE

## 2017-10-02 PROCEDURE — G8427 DOCREV CUR MEDS BY ELIG CLIN: HCPCS | Performed by: FAMILY MEDICINE

## 2017-10-02 PROCEDURE — 1036F TOBACCO NON-USER: CPT | Performed by: FAMILY MEDICINE

## 2017-10-02 PROCEDURE — 99213 OFFICE O/P EST LOW 20 MIN: CPT | Performed by: FAMILY MEDICINE

## 2017-10-02 PROCEDURE — G8417 CALC BMI ABV UP PARAM F/U: HCPCS | Performed by: FAMILY MEDICINE

## 2017-10-02 PROCEDURE — 1090F PRES/ABSN URINE INCON ASSESS: CPT | Performed by: FAMILY MEDICINE

## 2017-10-02 PROCEDURE — G8400 PT W/DXA NO RESULTS DOC: HCPCS | Performed by: FAMILY MEDICINE

## 2017-10-02 PROCEDURE — 1123F ACP DISCUSS/DSCN MKR DOCD: CPT | Performed by: FAMILY MEDICINE

## 2017-10-02 RX ORDER — OXYCODONE HCL 10 MG/1
10 TABLET, FILM COATED, EXTENDED RELEASE ORAL EVERY 12 HOURS
Qty: 60 TABLET | Refills: 0 | Status: SHIPPED | OUTPATIENT
Start: 2017-12-07 | End: 2018-01-05 | Stop reason: SDUPTHER

## 2017-10-02 RX ORDER — OXYCODONE HCL 10 MG/1
10 TABLET, FILM COATED, EXTENDED RELEASE ORAL EVERY 12 HOURS
Qty: 60 TABLET | Refills: 0 | Status: SHIPPED | OUTPATIENT
Start: 2017-10-08 | End: 2018-01-05 | Stop reason: SDUPTHER

## 2017-10-02 RX ORDER — OXYCODONE AND ACETAMINOPHEN 7.5; 325 MG/1; MG/1
1 TABLET ORAL EVERY 6 HOURS PRN
Qty: 60 TABLET | Refills: 0 | Status: SHIPPED | OUTPATIENT
Start: 2017-10-08 | End: 2018-01-05 | Stop reason: SDUPTHER

## 2017-10-02 RX ORDER — ALBUTEROL SULFATE 90 UG/1
2 AEROSOL, METERED RESPIRATORY (INHALATION) EVERY 6 HOURS PRN
Qty: 1 INHALER | Refills: 3 | Status: SHIPPED | OUTPATIENT
Start: 2017-10-02 | End: 2019-01-04 | Stop reason: SDUPTHER

## 2017-10-02 RX ORDER — OXYCODONE AND ACETAMINOPHEN 7.5; 325 MG/1; MG/1
TABLET ORAL
COMMUNITY
Start: 2017-09-08 | End: 2018-03-13 | Stop reason: SDUPTHER

## 2017-10-02 RX ORDER — OXYCODONE HCL 10 MG/1
10 TABLET, FILM COATED, EXTENDED RELEASE ORAL EVERY 12 HOURS
Qty: 60 TABLET | Refills: 0 | Status: SHIPPED | OUTPATIENT
Start: 2017-11-07 | End: 2018-01-05 | Stop reason: SDUPTHER

## 2017-10-02 RX ORDER — OXYCODONE AND ACETAMINOPHEN 7.5; 325 MG/1; MG/1
1 TABLET ORAL 2 TIMES DAILY
Qty: 60 TABLET | Refills: 0 | Status: SHIPPED | OUTPATIENT
Start: 2017-12-07 | End: 2018-01-05 | Stop reason: SDUPTHER

## 2017-10-02 RX ORDER — OXYCODONE AND ACETAMINOPHEN 7.5; 325 MG/1; MG/1
1 TABLET ORAL 2 TIMES DAILY
Qty: 60 TABLET | Refills: 0 | Status: SHIPPED | OUTPATIENT
Start: 2017-11-07 | End: 2018-01-05 | Stop reason: SDUPTHER

## 2017-10-02 ASSESSMENT — ENCOUNTER SYMPTOMS: BACK PAIN: 1

## 2017-10-02 NOTE — PROGRESS NOTES
Subjective:      Patient ID: Mitesh Dow is a 80 y.o. female. HPIHere for monitoring of pain meds for multiple arthritic issues and neuropathy    Review of Systems   Genitourinary: Positive for difficulty urinating, frequency and urgency. Musculoskeletal: Positive for arthralgias, back pain and gait problem (walks with cane assist). Neurological: Positive for numbness. Objective:   Physical Exam   Constitutional: No distress. HENT:   Mouth/Throat: No oropharyngeal exudate. Eyes: Right eye exhibits no discharge. Left eye exhibits no discharge. Neck: No thyromegaly present. Cardiovascular: Normal rate. No murmur heard. Pulmonary/Chest: No respiratory distress. Abdominal: She exhibits no distension. There is no tenderness. Musculoskeletal: She exhibits tenderness (hip  shoulders and knees on palpatation). She exhibits no edema. Lymphadenopathy:     She has no cervical adenopathy. Assessment:      1. Chronic pain syndrome  oxyCODONE (OXYCONTIN) 10 MG extended release tablet    oxyCODONE (OXYCONTIN) 10 MG extended release tablet    oxyCODONE (OXYCONTIN) 10 MG extended release tablet    oxyCODONE-acetaminophen (PERCOCET) 7.5-325 MG per tablet    oxyCODONE-acetaminophen (PERCOCET) 7.5-325 MG per tablet    oxyCODONE-acetaminophen (PERCOCET) 7.5-325 MG per tablet   2. Primary osteoarthritis of both hips  oxyCODONE (OXYCONTIN) 10 MG extended release tablet    oxyCODONE (OXYCONTIN) 10 MG extended release tablet    oxyCODONE (OXYCONTIN) 10 MG extended release tablet    oxyCODONE-acetaminophen (PERCOCET) 7.5-325 MG per tablet    oxyCODONE-acetaminophen (PERCOCET) 7.5-325 MG per tablet    oxyCODONE-acetaminophen (PERCOCET) 7.5-325 MG per tablet   3.  Fibromyalgia  oxyCODONE (OXYCONTIN) 10 MG extended release tablet    oxyCODONE (OXYCONTIN) 10 MG extended release tablet    oxyCODONE (OXYCONTIN) 10 MG extended release tablet    oxyCODONE-acetaminophen (PERCOCET) 7.5-325 MG per tablet oxyCODONE-acetaminophen (PERCOCET) 7.5-325 MG per tablet    oxyCODONE-acetaminophen (PERCOCET) 7.5-325 MG per tablet   4. Lumbar radiculopathy  oxyCODONE (OXYCONTIN) 10 MG extended release tablet    oxyCODONE (OXYCONTIN) 10 MG extended release tablet    oxyCODONE (OXYCONTIN) 10 MG extended release tablet    oxyCODONE-acetaminophen (PERCOCET) 7.5-325 MG per tablet    oxyCODONE-acetaminophen (PERCOCET) 7.5-325 MG per tablet    oxyCODONE-acetaminophen (PERCOCET) 7.5-325 MG per tablet   5.  Neuropathy (HCC)  oxyCODONE (OXYCONTIN) 10 MG extended release tablet    oxyCODONE (OXYCONTIN) 10 MG extended release tablet    oxyCODONE (OXYCONTIN) 10 MG extended release tablet    oxyCODONE-acetaminophen (PERCOCET) 7.5-325 MG per tablet    oxyCODONE-acetaminophen (PERCOCET) 7.5-325 MG per tablet    oxyCODONE-acetaminophen (PERCOCET) 7.5-325 MG per tablet           Plan:      Tana King script given 9/16/2017 and call if still with frequency

## 2017-10-03 ENCOUNTER — ANTI-COAG VISIT (OUTPATIENT)
Dept: CARDIOLOGY CLINIC | Age: 82
End: 2017-10-03

## 2017-10-05 PROBLEM — E66.01 MORBID OBESITY DUE TO EXCESS CALORIES (HCC): Status: ACTIVE | Noted: 2017-10-05

## 2017-10-11 ENCOUNTER — CARE COORDINATION (OUTPATIENT)
Dept: CASE MANAGEMENT | Age: 82
End: 2017-10-11

## 2017-10-19 ENCOUNTER — TELEPHONE (OUTPATIENT)
Dept: FAMILY MEDICINE CLINIC | Age: 82
End: 2017-10-19

## 2017-10-19 NOTE — TELEPHONE ENCOUNTER
----- Message from Davis Osei, [address]  sent at 10/10/2017  9:10 AM EDT -----  Patient needs hospital follow up within 2 weeks.   ----- Message -----  From: Shanice Gaitan MD  Sent: 10/7/2017   4:19 PM  To: Davis Osei, CNP

## 2017-10-19 NOTE — TELEPHONE ENCOUNTER
Needs f/u appt from \[facility]\"", told to call for appt with Ember Huang on 10/30/17 or 10/31/17.

## 2017-10-23 ENCOUNTER — TELEPHONE (OUTPATIENT)
Dept: ORTHOPEDIC SURGERY | Age: 82
End: 2017-10-23

## 2017-10-23 DIAGNOSIS — I48.91 ATRIAL FIBRILLATION, UNSPECIFIED TYPE (HCC): ICD-10-CM

## 2017-10-23 LAB
INR BLD: 1.6 (ref 0.85–1.15)
PROTHROMBIN TIME: 18.1 SEC (ref 9.6–13)

## 2017-10-23 NOTE — TELEPHONE ENCOUNTER
Pt is calling because she is calling to get info on where to get a pair of shoes that her insurance will pay for pls call pt to advise [phone]

## 2017-10-24 ENCOUNTER — TELEPHONE (OUTPATIENT)
Dept: PHARMACY | Facility: CLINIC | Age: 82
End: 2017-10-24

## 2017-10-24 ENCOUNTER — TELEPHONE (OUTPATIENT)
Dept: CARDIOLOGY CLINIC | Age: 82
End: 2017-10-24

## 2017-10-27 ENCOUNTER — ANTI-COAG VISIT (OUTPATIENT)
Dept: PHARMACY | Facility: CLINIC | Age: 82
End: 2017-10-27

## 2017-10-27 LAB
INR BLD: 2.9
PROTIME: 35.1 SECONDS

## 2017-10-27 NOTE — PROGRESS NOTES
Patient is new to our clinic. Two booklets \"Understanding your Coumadin therapy\" and  \"A heart-to-heart about A-Fib and stroke risk\" was provided and extensive education was given including but not limited to the following:     Indication and duration for therapy: A-FIB/indefinite   Dosing instructions: takes dose in evening/ has 5 mg (pink) tablets at home   INR goal: 2-3   Importance of INR monitoring   Avoid NSAIDs   Avoid alcohol: does not drink   Call with any medication changes including OTCs (starting, stopping, dose changes) and especially antibiotics and oral steroids   Protocol/prevention of falls   Vitamin K interaction and the importance of a consistent diet with regards to Vitamin K: only eats greens upon occasion   Signs and symptoms of bleeding and clotting   Non-somker   No MVI or supplements    Reviewed the after hours contact process   Call with any new medications/changes especially ABX and oral Steroids    Patient presents to clinic for teaching and INR. States she is doing well and has no complaints at this time. She has been on warfarin for > than 5 years. She has prescriptions filled at [facility]. She has a recent hospital admission where she was on ABX and oral steroids which caused problems with her INR and diabetes. Does have bruising on her arms and she states that is normal for her on this medication. She takes her Percocet and Oxycontin routinely. She states she did miss a dose of warfarin prior to her last INR = 1.6 and that was why it was so low. Continued her routine dosing of 5 mg daily except 7.5 mg on Sunday and Wednesday. Instructed patient she needed to take her 7.5 mg doses on scheduled days of the week, not randomly as she has in the past.  Patient verbalized understanding and has no questions at this time. Reviewed medication profile and adjusted warfarin dosing. Patient to be seen again in one week.

## 2017-11-03 ENCOUNTER — ANTI-COAG VISIT (OUTPATIENT)
Dept: PHARMACY | Facility: CLINIC | Age: 82
End: 2017-11-03

## 2017-11-03 DIAGNOSIS — I10 ESSENTIAL HYPERTENSION: ICD-10-CM

## 2017-11-03 LAB
INR BLD: 3.9
PROTIME: 46.6 SECONDS

## 2017-11-03 RX ORDER — METOPROLOL SUCCINATE 100 MG/1
TABLET, EXTENDED RELEASE ORAL
Qty: 60 TABLET | Refills: 0 | Status: SHIPPED | OUTPATIENT
Start: 2017-11-03 | End: 2017-11-03 | Stop reason: SDUPTHER

## 2017-11-03 RX ORDER — METOPROLOL SUCCINATE 100 MG/1
TABLET, EXTENDED RELEASE ORAL
Qty: 180 TABLET | Refills: 0 | Status: SHIPPED | OUTPATIENT
Start: 2017-11-03 | End: 2018-01-05 | Stop reason: SDUPTHER

## 2017-11-03 NOTE — PROGRESS NOTES
Ms. Enrique Barrientos is a 80 y.o.  female with history of A-Fib with RVR who presents today for anticoagulation monitoring and adjustment. Patient verifies current dosing regimen. Patient denies s/s bleeding/bruising/swelling/SOB. No blood in urine or stool. No dietary changes. No changes in medication/OTC agents/Herbals. No change in alcohol use. No missed doses. No Procedures scheduled in the future at this time. Lab Results   Component Value Date    INR 3.9 11/03/2017    INR 2.9 10/27/2017    INR 1.60 (H) 10/23/2017     Patient states doing well. No complaints at this time. She states this dose usually work well for her. Held does today 11-3-17, then continued warfarin 5 mg daily except 7.5 mg on Sunday and Wednesday. To return in 7 days. IF INR still elevated will reduce dose. Patient reminded to call the Anticoagulation Clinic with any signs or symptoms of bleeding or with any medication changes. Patient given instructions utilizing the teach back method. After visit summary printed and reviewed with patient.

## 2017-11-08 ENCOUNTER — HOSPITAL ENCOUNTER (OUTPATIENT)
Dept: NON INVASIVE DIAGNOSTICS | Age: 82
Discharge: HOME | End: 2017-11-08
Attending: INTERNAL MEDICINE | Admitting: INTERNAL MEDICINE

## 2017-11-08 DIAGNOSIS — I10 ESSENTIAL HYPERTENSION: ICD-10-CM

## 2017-11-08 DIAGNOSIS — I48.91 ATRIAL FIBRILLATION, UNSPECIFIED TYPE (HCC): ICD-10-CM

## 2017-11-08 DIAGNOSIS — R06.02 SOB (SHORTNESS OF BREATH): ICD-10-CM

## 2017-11-08 DIAGNOSIS — I48.91 ATRIAL FIBRILLATION (HCC): ICD-10-CM

## 2017-11-08 LAB
LV EF: 63 %
LVEF MODALITY: NORMAL

## 2017-11-10 ENCOUNTER — ANTI-COAG VISIT (OUTPATIENT)
Dept: PHARMACY | Facility: CLINIC | Age: 82
End: 2017-11-10

## 2017-11-10 LAB
INR BLD: 1.6
PROTIME: 19.5 SECONDS

## 2017-11-10 NOTE — PROGRESS NOTES
Ms. Thom Miller is a 80 y.o.  female with history of A-Fib with RVR who presents today for anticoagulation monitoring and adjustment. Patient verifies current dosing regimen. Patient denies s/s bleeding/bruising/swelling/SOB. No blood in urine or stool. No dietary changes. No changes in medication/OTC agents/Herbals. No change in alcohol use. No missed doses. No Procedures scheduled in the future at this time. Lab Results   Component Value Date    INR 1.6 11/10/2017    INR 3.9 11/03/2017    INR 2.9 10/27/2017       Patient states doing well. No complaints at this time. She states she was on Bactrim DS at some time within the last month but is unsure of date. That would explain the 3.9 INR from last week. She states her INR gets out of goal range easily when she is on any antibiotics. She was discharged with  Prednisone at the beginning of the month. Gave a 7.5 mg bolus today then resumed routine warfarin dosing of 5 mg daily except 7.5 mg on Sunday and Wednesday. To be seen again in one week. Patient reminded to call the Anticoagulation Clinic with any signs or symptoms of bleeding or with any medication changes. Patient given instructions utilizing the teach back method. After visit summary printed and reviewed with patient.

## 2017-11-17 ENCOUNTER — ANTI-COAG VISIT (OUTPATIENT)
Dept: PHARMACY | Facility: CLINIC | Age: 82
End: 2017-11-17

## 2017-11-17 LAB — INR BLD: 2.2

## 2017-12-15 ENCOUNTER — ANTI-COAG VISIT (OUTPATIENT)
Dept: PHARMACY | Facility: CLINIC | Age: 82
End: 2017-12-15

## 2017-12-15 LAB — INTERNATIONAL NORMALIZATION RATIO, POC: 3.9

## 2017-12-15 NOTE — PROGRESS NOTES
Ms. Enrique Barrientos is a 80 y.o.  female with history of A-Fib with rapid ventricular reponse  who presents today for anticoagulation monitoring and adjustment. Patient verifies current dosing regimen  Patient denies s/s bleeding/bruising/swelling/SOB  No blood in urine or stool. No dietary changes. No changes in medication/OTC agents/Herbals. No change in alcohol use. No missed doses. No Procedures scheduled in the future at this time. Lab Results   Component Value Date    INR 3.9 12/15/2017    INR 2.2 11/17/2017    INR 1.6 11/10/2017       Patient states not feeling well. States she is always in A-Fib and has CHF, feels tired today. Asked if she monitors her weight due to the CHF and she does not want to elaborate. Held warfarin today due to INR of 3.9. She does not eat vitamin K greens. Patient to continue warfarin 5 mg daily except 7.5 mg on Sunday and Wednesday. To see patient in two weeks. Patient reminded to call the Anticoagulation Clinic with any signs or symptoms of bleeding or with any medication changes.   Patient given instructions utilizing the teach back method.     After visit summary printed and reviewed with patient.

## 2017-12-29 ENCOUNTER — ANTI-COAG VISIT (OUTPATIENT)
Dept: PHARMACY | Facility: CLINIC | Age: 82
End: 2017-12-29

## 2017-12-29 LAB — INTERNATIONAL NORMALIZATION RATIO, POC: 3.2

## 2017-12-29 NOTE — PROGRESS NOTES
Ms. Sidney Gaspar is a 80 y.o.  female with history of A-FIb with RVR who presents today for anticoagulation monitoring and adjustment. Patient verifies current dosing regimen  Patient denies s/s bleeding/bruising/swelling/SOB  No blood in urine or stool. No dietary changes. No changes in medication/OTC agents/Herbals. No change in alcohol use. No missed doses. No Procedures scheduled in the future at this time. Lab Results   Component Value Date    INR 3.2 12/29/2017    INR 3.9 12/15/2017    INR 2.2 11/17/2017       Patient states doing well. No complaints at this time. Decreased weekly warfarin dosing by 6 % (2.5 mg/week) to 5 mg daily except 7.5 mg on Sunday. Patient states she does not know how long she will be able to continue to come to the clinic due to transportation issues. Have her scheduled for three weeks. After visit summary printed and reviewed with patient.

## 2018-01-05 ENCOUNTER — OFFICE VISIT (OUTPATIENT)
Dept: FAMILY MEDICINE CLINIC | Age: 83
End: 2018-01-05

## 2018-01-05 VITALS
SYSTOLIC BLOOD PRESSURE: 140 MMHG | HEIGHT: 63 IN | OXYGEN SATURATION: 99 % | DIASTOLIC BLOOD PRESSURE: 76 MMHG | TEMPERATURE: 98 F | BODY MASS INDEX: 32.6 KG/M2 | HEART RATE: 80 BPM | WEIGHT: 184 LBS

## 2018-01-05 DIAGNOSIS — I10 ESSENTIAL HYPERTENSION: Primary | ICD-10-CM

## 2018-01-05 DIAGNOSIS — M79.7 FIBROMYALGIA: ICD-10-CM

## 2018-01-05 DIAGNOSIS — G89.4 CHRONIC PAIN SYNDROME: ICD-10-CM

## 2018-01-05 DIAGNOSIS — G62.9 NEUROPATHY: ICD-10-CM

## 2018-01-05 DIAGNOSIS — M54.16 LUMBAR RADICULOPATHY: ICD-10-CM

## 2018-01-05 DIAGNOSIS — Z79.899 LONG TERM USE OF DRUG: ICD-10-CM

## 2018-01-05 DIAGNOSIS — M16.0 PRIMARY OSTEOARTHRITIS OF BOTH HIPS: ICD-10-CM

## 2018-01-05 LAB
AMPHETAMINE SCREEN, URINE: NORMAL
BARBITURATE SCREEN, URINE: NORMAL
BENZODIAZEPINE SCREEN, URINE: NORMAL
COCAINE METABOLITE SCREEN URINE: NORMAL
MDMA URINE: NORMAL
METHADONE SCREEN, URINE: NORMAL
METHAMPHETAMINE, URINE: NORMAL
OPIATE SCREEN URINE: NORMAL
OXYCODONE SCREEN URINE: NORMAL
PHENCYCLIDINE SCREEN URINE: NORMAL
PROPOXYPHENE SCREEN, URINE: NORMAL
THC: NORMAL
TRICYCLIC ANTIDEPRESSANTS, UR: NORMAL

## 2018-01-05 PROCEDURE — G8598 ASA/ANTIPLAT THER USED: HCPCS | Performed by: NURSE PRACTITIONER

## 2018-01-05 PROCEDURE — G8484 FLU IMMUNIZE NO ADMIN: HCPCS | Performed by: NURSE PRACTITIONER

## 2018-01-05 PROCEDURE — 1123F ACP DISCUSS/DSCN MKR DOCD: CPT | Performed by: NURSE PRACTITIONER

## 2018-01-05 PROCEDURE — 1090F PRES/ABSN URINE INCON ASSESS: CPT | Performed by: NURSE PRACTITIONER

## 2018-01-05 PROCEDURE — 80305 DRUG TEST PRSMV DIR OPT OBS: CPT | Performed by: NURSE PRACTITIONER

## 2018-01-05 PROCEDURE — 1036F TOBACCO NON-USER: CPT | Performed by: NURSE PRACTITIONER

## 2018-01-05 PROCEDURE — G8427 DOCREV CUR MEDS BY ELIG CLIN: HCPCS | Performed by: NURSE PRACTITIONER

## 2018-01-05 PROCEDURE — G8400 PT W/DXA NO RESULTS DOC: HCPCS | Performed by: NURSE PRACTITIONER

## 2018-01-05 PROCEDURE — 99214 OFFICE O/P EST MOD 30 MIN: CPT | Performed by: NURSE PRACTITIONER

## 2018-01-05 PROCEDURE — G8417 CALC BMI ABV UP PARAM F/U: HCPCS | Performed by: NURSE PRACTITIONER

## 2018-01-05 PROCEDURE — 4040F PNEUMOC VAC/ADMIN/RCVD: CPT | Performed by: NURSE PRACTITIONER

## 2018-01-05 RX ORDER — OXYCODONE AND ACETAMINOPHEN 7.5; 325 MG/1; MG/1
1 TABLET ORAL EVERY 6 HOURS PRN
Qty: 60 TABLET | Refills: 0 | Status: SHIPPED | OUTPATIENT
Start: 2018-01-06 | End: 2018-04-03 | Stop reason: SDUPTHER

## 2018-01-05 RX ORDER — OXYCODONE HCL 10 MG/1
10 TABLET, FILM COATED, EXTENDED RELEASE ORAL EVERY 12 HOURS
Qty: 60 TABLET | Refills: 0 | Status: SHIPPED | OUTPATIENT
Start: 2018-02-13 | End: 2018-01-05 | Stop reason: SDUPTHER

## 2018-01-05 RX ORDER — LISINOPRIL 20 MG/1
20 TABLET ORAL DAILY
Qty: 90 TABLET | Refills: 1 | Status: SHIPPED | OUTPATIENT
Start: 2018-01-05 | End: 2018-08-27 | Stop reason: SDUPTHER

## 2018-01-05 RX ORDER — OXYCODONE HCL 10 MG/1
10 TABLET, FILM COATED, EXTENDED RELEASE ORAL EVERY 12 HOURS
Qty: 60 TABLET | Refills: 0 | Status: SHIPPED | OUTPATIENT
Start: 2018-02-13 | End: 2018-03-13 | Stop reason: SDUPTHER

## 2018-01-05 RX ORDER — OXYCODONE HCL 10 MG/1
10 TABLET, FILM COATED, EXTENDED RELEASE ORAL EVERY 12 HOURS
Qty: 60 TABLET | Refills: 0 | Status: CANCELLED | OUTPATIENT
Start: 2018-02-13 | End: 2018-03-15

## 2018-01-05 RX ORDER — OXYCODONE HCL 10 MG/1
10 TABLET, FILM COATED, EXTENDED RELEASE ORAL EVERY 12 HOURS
Qty: 60 TABLET | Refills: 0 | Status: SHIPPED | OUTPATIENT
Start: 2018-03-15 | End: 2018-04-03 | Stop reason: SDUPTHER

## 2018-01-05 RX ORDER — OXYCODONE AND ACETAMINOPHEN 7.5; 325 MG/1; MG/1
1 TABLET ORAL 2 TIMES DAILY
Qty: 60 TABLET | Refills: 0 | Status: SHIPPED | OUTPATIENT
Start: 2018-02-05 | End: 2018-04-03 | Stop reason: SDUPTHER

## 2018-01-05 RX ORDER — OXYCODONE HCL 10 MG/1
10 TABLET, FILM COATED, EXTENDED RELEASE ORAL EVERY 12 HOURS
Qty: 60 TABLET | Refills: 0 | Status: SHIPPED | OUTPATIENT
Start: 2018-01-14 | End: 2018-04-03 | Stop reason: SDUPTHER

## 2018-01-05 RX ORDER — OXYCODONE AND ACETAMINOPHEN 7.5; 325 MG/1; MG/1
1 TABLET ORAL 2 TIMES DAILY
Qty: 60 TABLET | Refills: 0 | Status: SHIPPED | OUTPATIENT
Start: 2018-03-07 | End: 2018-04-03 | Stop reason: SDUPTHER

## 2018-01-05 RX ORDER — METOPROLOL SUCCINATE 100 MG/1
TABLET, EXTENDED RELEASE ORAL
Qty: 180 TABLET | Refills: 0 | Status: SHIPPED | OUTPATIENT
Start: 2018-01-05 | End: 2018-02-05 | Stop reason: SDUPTHER

## 2018-01-05 ASSESSMENT — ENCOUNTER SYMPTOMS
CONSTIPATION: 1
PHOTOPHOBIA: 0
NAUSEA: 0
VOMITING: 0
EYE ITCHING: 0
CHOKING: 0
CHEST TIGHTNESS: 0
ALLERGIC/IMMUNOLOGIC NEGATIVE: 1
EYE DISCHARGE: 0
EYE PAIN: 0
DIARRHEA: 0
BACK PAIN: 1
SORE THROAT: 0
SHORTNESS OF BREATH: 0
EYES NEGATIVE: 1
SINUS PRESSURE: 0
WHEEZING: 0
COUGH: 0
ABDOMINAL PAIN: 0
EYE REDNESS: 0
RHINORRHEA: 0

## 2018-01-05 NOTE — PROGRESS NOTES
Subjective:      Patient ID: Jose Slade is a 80 y.o. female. HPI    Patient with a history of HTN, GERD, COPD, fibromyalgia, chronic low back and hip pain and a-fib is here for follow up and refills. She is complaining of bilateral shoulder pain, worse on the right. She has difficulty lifting her shoulders above 80 degrees. No recent injury, but she has a history of history of arthritis in her shoulder and has had a right shoulder arthroscopy. These symptoms are unchanged. She has been out of percocet for three days and is not due to refill until tomorrow. She admits that on \"really bad\" pain days, she takes the medication 3 times instead of 2 times. She tries to make up for it by taking only one on \"really good\" days, but ran short this month. She is aware that I will not increase her dosing. We discussed her increased fall risk. She states that she mostly falls \"because of my cats\". She has not been taking lisinopril (she just \"stopped\" it), but is advised that she needs to be taking it for her blood pressure. She will restart. Past Medical History:   Diagnosis Date    A fib     Arthritis     [facility]     Coronary artery disease     Femur fracture ([address] 01/2016    left    Fibromyalgia     GERD (gastroesophageal reflux disease)     History of shingles     Hypertension     PONV (postoperative nausea and vomiting)     Rotator cuff disorder     left     Ulcer of gastroesophageal junction 2006       Review of Systems   Constitutional: Negative for activity change, appetite change, chills, diaphoresis, fatigue and fever. HENT: Negative. Negative for congestion, ear pain, rhinorrhea, sinus pressure, sneezing and sore throat. Eyes: Negative. Negative for photophobia, pain, discharge, redness, itching and visual disturbance. Respiratory: Negative for cough, choking, chest tightness, shortness of breath and wheezing.     Cardiovascular: Negative. Negative for chest pain, palpitations and leg swelling. Paroxysmal Atrial fibrillation   Gastrointestinal: Positive for constipation (due to narcotics>>managed with prn miralax). Negative for abdominal pain, diarrhea, nausea and vomiting. Endocrine: Negative. Negative for heat intolerance. Genitourinary: Negative. Negative for difficulty urinating, flank pain, frequency, hematuria and urgency. Musculoskeletal: Positive for arthralgias, back pain, gait problem and myalgias. Negative for joint swelling, neck pain and neck stiffness. Bilateral shoulder pain (R>L)    Fibromyalgia    Chronic left hip pain       Skin: Negative. Allergic/Immunologic: Negative. Neurological: Negative for light-headedness and headaches. Hematological: Negative. Psychiatric/Behavioral: Negative. Negative for agitation, behavioral problems, confusion, decreased concentration, dysphoric mood, self-injury and suicidal ideas. The patient is not nervous/anxious. Objective:   Physical Exam   Constitutional: She is oriented to person, place, and time. She appears well-developed and well-nourished. No distress. HENT:   Head: Normocephalic and atraumatic. Eyes: Conjunctivae are normal. Pupils are equal, round, and reactive to light. No scleral icterus. Neck: Normal range of motion. Neck supple. No tracheal deviation present. No bruits     Cardiovascular: Normal rate, regular rhythm and normal heart sounds. Regular rate; irregular rhythm. Pulmonary/Chest: Effort normal and breath sounds normal. No respiratory distress. She has no wheezes. She has no rales. Abdominal: Soft. She exhibits no distension and no mass. There is no tenderness. There is no guarding. Musculoskeletal: She exhibits tenderness. She exhibits no edema. She walks with a cane or walker    OA changes noted in bilateral hands. Right shoulder is tender and there is decrease ROM in both shoulders.    Neurological: She

## 2018-01-19 ENCOUNTER — ANTI-COAG VISIT (OUTPATIENT)
Dept: PHARMACY | Facility: CLINIC | Age: 83
End: 2018-01-19

## 2018-01-19 LAB — INTERNATIONAL NORMALIZATION RATIO, POC: 3.7

## 2018-02-05 DIAGNOSIS — I10 ESSENTIAL HYPERTENSION: ICD-10-CM

## 2018-02-05 RX ORDER — METOPROLOL SUCCINATE 100 MG/1
TABLET, EXTENDED RELEASE ORAL
Qty: 180 TABLET | Refills: 3 | Status: ON HOLD | OUTPATIENT
Start: 2018-02-05 | End: 2018-06-13 | Stop reason: HOSPADM

## 2018-02-05 NOTE — TELEPHONE ENCOUNTER
Medication Refill    When was your last appointment with cardiology?  5/10/17  (if 1year or longer, please schedule an appointment) 3/12/18    Medication needing refilled: metoprolol succinate (TOPROL XL)    Doseage of the medication: 100 MG extended release tablet    How are you taking this medication (QD, BID, TID, QID, PRN): TAKE ONE TABLET BY MOUTH TWICE A DAY    Patient want a 30 or 90 day supply called in: 90 days    Which Pharmacy are we sending the medication to: [facility] [address] [facility], [address] [place] [address]  [phone] - F [phone]

## 2018-02-16 ENCOUNTER — ANTI-COAG VISIT (OUTPATIENT)
Dept: PHARMACY | Facility: CLINIC | Age: 83
End: 2018-02-16

## 2018-02-16 DIAGNOSIS — I48.91 ATRIAL FIBRILLATION WITH RAPID VENTRICULAR RESPONSE (HCC): ICD-10-CM

## 2018-02-16 LAB — INTERNATIONAL NORMALIZATION RATIO, POC: 2.9

## 2018-03-06 ENCOUNTER — ANTI-COAG VISIT (OUTPATIENT)
Dept: PHARMACY | Facility: CLINIC | Age: 83
End: 2018-03-06

## 2018-03-06 DIAGNOSIS — I48.91 ATRIAL FIBRILLATION WITH RAPID VENTRICULAR RESPONSE (HCC): ICD-10-CM

## 2018-03-06 LAB — INTERNATIONAL NORMALIZATION RATIO, POC: 1.6

## 2018-03-13 ENCOUNTER — OFFICE VISIT (OUTPATIENT)
Dept: CARDIOLOGY CLINIC | Age: 83
End: 2018-03-13

## 2018-03-13 VITALS
OXYGEN SATURATION: 93 % | WEIGHT: 185 LBS | BODY MASS INDEX: 32.78 KG/M2 | HEIGHT: 63 IN | HEART RATE: 78 BPM | DIASTOLIC BLOOD PRESSURE: 84 MMHG | SYSTOLIC BLOOD PRESSURE: 150 MMHG

## 2018-03-13 DIAGNOSIS — R07.9 CHEST PAIN, UNSPECIFIED TYPE: ICD-10-CM

## 2018-03-13 DIAGNOSIS — I48.20 CHRONIC A-FIB (HCC): ICD-10-CM

## 2018-03-13 DIAGNOSIS — R06.02 SOB (SHORTNESS OF BREATH): Primary | ICD-10-CM

## 2018-03-13 DIAGNOSIS — I10 ESSENTIAL HYPERTENSION: ICD-10-CM

## 2018-03-13 DIAGNOSIS — E78.2 MIXED HYPERLIPIDEMIA: ICD-10-CM

## 2018-03-13 PROCEDURE — 1036F TOBACCO NON-USER: CPT | Performed by: INTERNAL MEDICINE

## 2018-03-13 PROCEDURE — G8400 PT W/DXA NO RESULTS DOC: HCPCS | Performed by: INTERNAL MEDICINE

## 2018-03-13 PROCEDURE — 99214 OFFICE O/P EST MOD 30 MIN: CPT | Performed by: INTERNAL MEDICINE

## 2018-03-13 PROCEDURE — 1123F ACP DISCUSS/DSCN MKR DOCD: CPT | Performed by: INTERNAL MEDICINE

## 2018-03-13 PROCEDURE — G8484 FLU IMMUNIZE NO ADMIN: HCPCS | Performed by: INTERNAL MEDICINE

## 2018-03-13 PROCEDURE — 4040F PNEUMOC VAC/ADMIN/RCVD: CPT | Performed by: INTERNAL MEDICINE

## 2018-03-13 PROCEDURE — G8417 CALC BMI ABV UP PARAM F/U: HCPCS | Performed by: INTERNAL MEDICINE

## 2018-03-13 PROCEDURE — 93000 ELECTROCARDIOGRAM COMPLETE: CPT | Performed by: INTERNAL MEDICINE

## 2018-03-13 PROCEDURE — 1090F PRES/ABSN URINE INCON ASSESS: CPT | Performed by: INTERNAL MEDICINE

## 2018-03-13 PROCEDURE — G8427 DOCREV CUR MEDS BY ELIG CLIN: HCPCS | Performed by: INTERNAL MEDICINE

## 2018-03-13 PROCEDURE — G8598 ASA/ANTIPLAT THER USED: HCPCS | Performed by: INTERNAL MEDICINE

## 2018-03-13 NOTE — LETTER
by mouth 2 times daily for 30 days. 60 tablet 0    lisinopril (PRINIVIL;ZESTRIL) 20 MG tablet Take 1 tablet by mouth daily 90 tablet 1    albuterol sulfate HFA (PROAIR HFA) 108 (90 Base) MCG/ACT inhaler Inhale 2 puffs into the lungs every 6 hours as needed for Wheezing 1 Inhaler 3    Misc. Devices ([address]) MISC 1 each by Does not apply route continuous Indications: Ambulatory Dysfunction, Loss of Balance, Joint Damage causing Pain and Loss of Function 1 each 0    warfarin (COUMADIN) 5 MG tablet TAKE ONE TABLET BY MOUTH DAILY EXCEPT TAKE 1 AND 1/2 ON TUESDAY AND THURSDAY OR AS DIRECTED BY PHYSICIAN (Patient taking differently: TAKE WARFARIN 5 MG DAILY  OR AS DIRECTED [facility]. [facility] # [phone]) 90 tablet 3     No current facility-administered medications for this visit. Review of Systems:  Review of systems is as detailed above and all other symptoms are negative. Physical Exam:   BP (!) 150/84   Pulse 78   Ht 5' 3\" (1.6 m)   Wt 185 lb (83.9 kg) Comment: did not wish to remove shoes  SpO2 93%   BMI 32.77 kg/m²    Wt Readings from Last 3 Encounters:   03/13/18 185 lb (83.9 kg)   01/05/18 184 lb (83.5 kg)   10/05/17 183 lb 10.3 oz (83.3 kg)     Constitutional: She is oriented to person, place, and time. She appears well-developed and well-nourished. In no acute distress. Head: Normocephalic and atraumatic. Pupils equal and round. Neck: Neck supple. No JVP or carotid bruit appreciated. No mass and no thyromegaly present. No lymphadenopathy present. Cardiovascular: Irregularly irregular rate. Normal heart sounds. Exam reveals no gallop and no friction rub. No murmur heard. Pulmonary/Chest: Effort normal and breath sounds normal. No respiratory distress. She has extensive rhonchi, few wheezing and crackles throughout all lung fields. Abdominal: Soft, non-tender. Bowel sounds are normal. She exhibits no organomegaly, mass or bruit.

## 2018-03-13 NOTE — PATIENT INSTRUCTIONS
you take decongestants or anti-inflammatory medicine, such as ibuprofen. Some of these medicines can raise blood pressure. · Learn how to check your blood pressure at home. Lifestyle changes  · Stay at a healthy weight. This is especially important if you put on weight around the waist. Losing even 10 pounds can help you lower your blood pressure. · If your doctor recommends it, get more exercise. Walking is a good choice. Bit by bit, increase the amount you walk every day. Try for at least 30 minutes on most days of the week. You also may want to swim, bike, or do other activities. · Avoid or limit alcohol. Talk to your doctor about whether you can drink any alcohol. · Try to limit how much sodium you eat to less than 2,300 milligrams (mg) a day. Your doctor may ask you to try to eat less than 1,500 mg a day. · Eat plenty of fruits (such as bananas and oranges), vegetables, legumes, whole grains, and low-fat dairy products. · Lower the amount of saturated fat in your diet. Saturated fat is found in animal products such as milk, cheese, and meat. Limiting these foods may help you lose weight and also lower your risk for heart disease. · Do not smoke. Smoking increases your risk for heart attack and stroke. If you need help quitting, talk to your doctor about stop-smoking programs and medicines. These can increase your chances of quitting for good. When should you call for help? Call 911 anytime you think you may need emergency care. This may mean having symptoms that suggest that your blood pressure is causing a serious heart or blood vessel problem. Your blood pressure may be over 180/110. ? For example, call 911 if:  ? · You have symptoms of a heart attack. These may include:  ¨ Chest pain or pressure, or a strange feeling in the chest.  ¨ Sweating. ¨ Shortness of breath. ¨ Nausea or vomiting.   ¨ Pain, pressure, or a strange feeling in the back, neck, jaw, or upper belly or in one or both shoulders or arms.  ¨ Lightheadedness or sudden weakness. ¨ A fast or irregular heartbeat. ? · You have symptoms of a stroke. These may include:  ¨ Sudden numbness, tingling, weakness, or loss of movement in your face, arm, or leg, especially on only one side of your body. ¨ Sudden vision changes. ¨ Sudden trouble speaking. ¨ Sudden confusion or trouble understanding simple statements. ¨ Sudden problems with walking or balance. ¨ A sudden, severe headache that is different from past headaches. ? · You have severe back or belly pain. ?Do not wait until your blood pressure comes down on its own. Get help right away. ?Call your doctor now or seek immediate care if:  ? · Your blood pressure is much higher than normal (such as 180/110 or higher), but you don't have symptoms. ? · You think high blood pressure is causing symptoms, such as:  ¨ Severe headache. ¨ Blurry vision. ? Watch closely for changes in your health, and be sure to contact your doctor if:  ? · Your blood pressure measures 140/90 or higher at least 2 times. That means the top number is 140 or higher or the bottom number is 90 or higher, or both. ? · You think you may be having side effects from your blood pressure medicine. ? · Your blood pressure is usually normal, but it goes above normal at least 2 times. Where can you learn more? Go to https://YotpopeMyMundus.Maiyet. org and sign in to your ConnectToHome account. Enter G372 in the AppAddictive box to learn more about \"High Blood Pressure: Care Instructions. \"     If you do not have an account, please click on the \"Sign Up Now\" link. Current as of: Abril 10, 2017  Content Version: 11.5  © 7279-1130 [facility] EHR. Care instructions adapted under license by [facility] ([facility]).  If you have questions about a medical condition or this instruction, always ask your healthcare professional. Yoan Shah any warranty or liability for your use of this labels tell you how much sodium is in each serving. Make sure that you look at the serving size. If you eat more than the serving size, you have eaten more sodium. · Food labels also tell you the Percent Daily Value for sodium. Choose products with low Percent Daily Values for sodium. · Be aware that sodium can come in forms other than salt, including monosodium glutamate (MSG), sodium citrate, and sodium bicarbonate (baking soda). MSG is often added to Asian food. When you eat out, you can sometimes ask for food without MSG or added salt. Buy low-sodium foods  · Buy foods that are labeled \"unsalted\" (no salt added), \"sodium-free\" (less than 5 mg of sodium per serving), or \"low-sodium\" (less than 140 mg of sodium per serving). Foods labeled \"reduced-sodium\" and \"light sodium\" may still have too much sodium. Be sure to read the label to see how much sodium you are getting. · Buy fresh vegetables, or frozen vegetables without added sauces. Buy low-sodium versions of canned vegetables, soups, and other canned goods. Prepare low-sodium meals  · Cut back on the amount of salt you use in cooking. This will help you adjust to the taste. Do not add salt after cooking. One teaspoon of salt has about 2,300 mg of sodium. · Take the salt shaker off the table. · Flavor your food with garlic, lemon juice, onion, vinegar, herbs, and spices. Do not use soy sauce, lite soy sauce, steak sauce, onion salt, garlic salt, celery salt, mustard, or ketchup on your food. · Use low-sodium salad dressings, sauces, and ketchup. Or make your own salad dressings and sauces without adding salt. · Use less salt (or none) when recipes call for it. You can often use half the salt a recipe calls for without losing flavor. Other foods such as rice, pasta, and grains do not need added salt. · Rinse canned vegetables, and cook them in fresh water. This removes some-but not all-of the salt.   · Avoid water that is naturally high in sodium or that has been treated with water softeners, which add sodium. Call your local water company to find out the sodium content of your water supply. If you buy bottled water, read the label and choose a sodium-free brand. Avoid high-sodium foods  · Avoid eating:  ¨ Smoked, cured, salted, and canned meat, fish, and poultry. ¨ Ham, forman, hot dogs, and luncheon meats. ¨ Regular, hard, and processed cheese and regular peanut butter. ¨ Crackers with salted tops, and other salted snack foods such as pretzels, chips, and salted popcorn. ¨ Frozen prepared meals, unless labeled low-sodium. ¨ Canned and dried soups, broths, and bouillon, unless labeled sodium-free or low-sodium. ¨ Canned vegetables, unless labeled sodium-free or low-sodium. ¨ Western [place]huri fries, pizza, tacos, and other fast foods. ¨ Pickles, olives, ketchup, and other condiments, especially soy sauce, unless labeled sodium-free or low-sodium. Where can you learn more? Go to https://Quixey.InfiniDB. org and sign in to your Majitek account. Enter T318 in the [facility] box to learn more about \"Low Sodium Diet (2,000 Milligram): Care Instructions. \"     If you do not have an account, please click on the \"Sign Up Now\" link. Current as of: May 12, 2017  Content Version: 11.5  © 0443-6370 [employer]. Care instructions adapted under license by [facility] ([facility]). If you have questions about a medical condition or this instruction, always ask your healthcare professional. [place] [address] any warranty or liability for your use of this information.

## 2018-03-23 ENCOUNTER — ANTI-COAG VISIT (OUTPATIENT)
Dept: PHARMACY | Facility: CLINIC | Age: 83
End: 2018-03-23

## 2018-03-23 ENCOUNTER — TELEPHONE (OUTPATIENT)
Dept: PHARMACY | Facility: CLINIC | Age: 83
End: 2018-03-23

## 2018-03-23 LAB — INTERNATIONAL NORMALIZATION RATIO, POC: 2.1

## 2018-03-23 NOTE — TELEPHONE ENCOUNTER
Call to [facility] # [phone]. Mia Mi Left on prescription line. Warfarin 5 mg tablets     #30  Sig: Take one tablet daily or as directed by the [facility] # [phone].     MR x 3     Dr. Zan Gamboa. Geremias/Zhao

## 2018-03-30 DIAGNOSIS — E78.2 MIXED HYPERLIPIDEMIA: ICD-10-CM

## 2018-03-30 LAB
A/G RATIO: 1.4 (ref 1.1–2.2)
ALBUMIN SERPL-MCNC: 4.3 G/DL (ref 3.4–5)
ALP BLD-CCNC: 65 U/L (ref 40–129)
ALT SERPL-CCNC: 10 U/L (ref 10–40)
ANION GAP SERPL CALCULATED.3IONS-SCNC: 12 MMOL/L (ref 3–16)
AST SERPL-CCNC: 19 U/L (ref 15–37)
BILIRUB SERPL-MCNC: 1.1 MG/DL (ref 0–1)
BUN BLDV-MCNC: 17 MG/DL (ref 7–20)
CALCIUM SERPL-MCNC: 9.2 MG/DL (ref 8.3–10.6)
CHLORIDE BLD-SCNC: 101 MMOL/L (ref 99–110)
CHOLESTEROL, TOTAL: 139 MG/DL (ref 0–199)
CO2: 29 MMOL/L (ref 21–32)
CREAT SERPL-MCNC: 0.7 MG/DL (ref 0.6–1.2)
GFR AFRICAN AMERICAN: >60
GFR NON-AFRICAN AMERICAN: >60
GLOBULIN: 3 G/DL
GLUCOSE BLD-MCNC: 149 MG/DL (ref 70–99)
HDLC SERPL-MCNC: 44 MG/DL (ref 40–60)
LDL CHOLESTEROL CALCULATED: 73 MG/DL
POTASSIUM SERPL-SCNC: 5 MMOL/L (ref 3.5–5.1)
SODIUM BLD-SCNC: 142 MMOL/L (ref 136–145)
TOTAL PROTEIN: 7.3 G/DL (ref 6.4–8.2)
TRIGL SERPL-MCNC: 110 MG/DL (ref 0–150)
VLDLC SERPL CALC-MCNC: 22 MG/DL

## 2018-04-03 ENCOUNTER — OFFICE VISIT (OUTPATIENT)
Dept: FAMILY MEDICINE CLINIC | Age: 83
End: 2018-04-03

## 2018-04-03 VITALS
BODY MASS INDEX: 33.66 KG/M2 | HEART RATE: 72 BPM | DIASTOLIC BLOOD PRESSURE: 78 MMHG | TEMPERATURE: 97.9 F | SYSTOLIC BLOOD PRESSURE: 130 MMHG | OXYGEN SATURATION: 95 % | HEIGHT: 63 IN | WEIGHT: 190 LBS

## 2018-04-03 DIAGNOSIS — R73.03 PREDIABETES: ICD-10-CM

## 2018-04-03 DIAGNOSIS — M54.16 LUMBAR RADICULOPATHY: ICD-10-CM

## 2018-04-03 DIAGNOSIS — J44.9 CHRONIC OBSTRUCTIVE PULMONARY DISEASE, UNSPECIFIED COPD TYPE (HCC): ICD-10-CM

## 2018-04-03 DIAGNOSIS — G89.4 CHRONIC PAIN SYNDROME: ICD-10-CM

## 2018-04-03 DIAGNOSIS — Z91.81 AT HIGH RISK FOR FALLS: ICD-10-CM

## 2018-04-03 DIAGNOSIS — M16.0 PRIMARY OSTEOARTHRITIS OF BOTH HIPS: Primary | ICD-10-CM

## 2018-04-03 DIAGNOSIS — I10 ESSENTIAL HYPERTENSION: ICD-10-CM

## 2018-04-03 DIAGNOSIS — M79.7 FIBROMYALGIA: ICD-10-CM

## 2018-04-03 DIAGNOSIS — G62.9 NEUROPATHY: ICD-10-CM

## 2018-04-03 LAB — HBA1C MFR BLD: 6.4 %

## 2018-04-03 PROCEDURE — 99214 OFFICE O/P EST MOD 30 MIN: CPT | Performed by: NURSE PRACTITIONER

## 2018-04-03 PROCEDURE — G8417 CALC BMI ABV UP PARAM F/U: HCPCS | Performed by: NURSE PRACTITIONER

## 2018-04-03 PROCEDURE — 83036 HEMOGLOBIN GLYCOSYLATED A1C: CPT | Performed by: NURSE PRACTITIONER

## 2018-04-03 PROCEDURE — G8427 DOCREV CUR MEDS BY ELIG CLIN: HCPCS | Performed by: NURSE PRACTITIONER

## 2018-04-03 PROCEDURE — 1090F PRES/ABSN URINE INCON ASSESS: CPT | Performed by: NURSE PRACTITIONER

## 2018-04-03 PROCEDURE — 3023F SPIROM DOC REV: CPT | Performed by: NURSE PRACTITIONER

## 2018-04-03 PROCEDURE — 4040F PNEUMOC VAC/ADMIN/RCVD: CPT | Performed by: NURSE PRACTITIONER

## 2018-04-03 PROCEDURE — G8400 PT W/DXA NO RESULTS DOC: HCPCS | Performed by: NURSE PRACTITIONER

## 2018-04-03 PROCEDURE — 1036F TOBACCO NON-USER: CPT | Performed by: NURSE PRACTITIONER

## 2018-04-03 PROCEDURE — G8598 ASA/ANTIPLAT THER USED: HCPCS | Performed by: NURSE PRACTITIONER

## 2018-04-03 PROCEDURE — G8926 SPIRO NO PERF OR DOC: HCPCS | Performed by: NURSE PRACTITIONER

## 2018-04-03 PROCEDURE — 1123F ACP DISCUSS/DSCN MKR DOCD: CPT | Performed by: NURSE PRACTITIONER

## 2018-04-03 RX ORDER — OXYCODONE HCL 10 MG/1
10 TABLET, FILM COATED, EXTENDED RELEASE ORAL EVERY 12 HOURS
Qty: 60 TABLET | Refills: 0 | Status: SHIPPED | OUTPATIENT
Start: 2018-04-18 | End: 2018-07-05 | Stop reason: SDUPTHER

## 2018-04-03 RX ORDER — OXYCODONE AND ACETAMINOPHEN 7.5; 325 MG/1; MG/1
1 TABLET ORAL 2 TIMES DAILY
Qty: 60 TABLET | Refills: 0 | Status: SHIPPED | OUTPATIENT
Start: 2018-06-05 | End: 2018-07-05 | Stop reason: SDUPTHER

## 2018-04-03 RX ORDER — OXYCODONE AND ACETAMINOPHEN 7.5; 325 MG/1; MG/1
1 TABLET ORAL 2 TIMES DAILY
Qty: 60 TABLET | Refills: 0 | Status: SHIPPED | OUTPATIENT
Start: 2018-05-06 | End: 2018-07-05 | Stop reason: SDUPTHER

## 2018-04-03 RX ORDER — OXYCODONE HCL 10 MG/1
10 TABLET, FILM COATED, EXTENDED RELEASE ORAL EVERY 12 HOURS
Qty: 60 TABLET | Refills: 0 | Status: SHIPPED | OUTPATIENT
Start: 2018-05-18 | End: 2018-06-11 | Stop reason: ALTCHOICE

## 2018-04-03 RX ORDER — OXYCODONE AND ACETAMINOPHEN 7.5; 325 MG/1; MG/1
1 TABLET ORAL 2 TIMES DAILY
Qty: 60 TABLET | Refills: 0 | Status: SHIPPED | OUTPATIENT
Start: 2018-04-06 | End: 2018-07-05 | Stop reason: SDUPTHER

## 2018-04-03 RX ORDER — OXYCODONE HCL 10 MG/1
10 TABLET, FILM COATED, EXTENDED RELEASE ORAL EVERY 12 HOURS
Qty: 60 TABLET | Refills: 0 | Status: SHIPPED | OUTPATIENT
Start: 2018-06-17 | End: 2018-07-05 | Stop reason: SDUPTHER

## 2018-04-03 ASSESSMENT — ENCOUNTER SYMPTOMS
SINUS PRESSURE: 0
SORE THROAT: 0
WHEEZING: 0
CHOKING: 0
DIARRHEA: 0
CONSTIPATION: 1
NAUSEA: 0
EYE REDNESS: 0
VOMITING: 0
EYE DISCHARGE: 0
CHEST TIGHTNESS: 0
ALLERGIC/IMMUNOLOGIC NEGATIVE: 1
RHINORRHEA: 0
ABDOMINAL PAIN: 0
PHOTOPHOBIA: 0
EYE ITCHING: 0
BACK PAIN: 1
EYE PAIN: 0
SHORTNESS OF BREATH: 0
EYES NEGATIVE: 1
COUGH: 0

## 2018-04-20 ENCOUNTER — ANTI-COAG VISIT (OUTPATIENT)
Dept: PHARMACY | Facility: CLINIC | Age: 83
End: 2018-04-20

## 2018-04-20 LAB — INTERNATIONAL NORMALIZATION RATIO, POC: 2.7

## 2018-04-26 ENCOUNTER — OFFICE VISIT (OUTPATIENT)
Dept: PRIMARY CARE CLINIC | Age: 83
End: 2018-04-26

## 2018-04-26 VITALS
TEMPERATURE: 98.2 F | OXYGEN SATURATION: 97 % | BODY MASS INDEX: 32.6 KG/M2 | HEART RATE: 90 BPM | HEIGHT: 63 IN | SYSTOLIC BLOOD PRESSURE: 116 MMHG | DIASTOLIC BLOOD PRESSURE: 64 MMHG | WEIGHT: 184 LBS

## 2018-04-26 DIAGNOSIS — I10 ESSENTIAL HYPERTENSION: ICD-10-CM

## 2018-04-26 DIAGNOSIS — K21.9 GASTROESOPHAGEAL REFLUX DISEASE WITHOUT ESOPHAGITIS: ICD-10-CM

## 2018-04-26 DIAGNOSIS — M62.81 MUSCLE WEAKNESS (GENERALIZED): ICD-10-CM

## 2018-04-26 DIAGNOSIS — Z01.818 PREOP EXAMINATION: Primary | ICD-10-CM

## 2018-04-26 DIAGNOSIS — H25.9 AGE-RELATED CATARACT OF BOTH EYES, UNSPECIFIED AGE-RELATED CATARACT TYPE: ICD-10-CM

## 2018-04-26 DIAGNOSIS — I48.91 ATRIAL FIBRILLATION WITH RAPID VENTRICULAR RESPONSE (HCC): ICD-10-CM

## 2018-04-26 PROCEDURE — 1036F TOBACCO NON-USER: CPT | Performed by: NURSE PRACTITIONER

## 2018-04-26 PROCEDURE — 4040F PNEUMOC VAC/ADMIN/RCVD: CPT | Performed by: NURSE PRACTITIONER

## 2018-04-26 PROCEDURE — 36415 COLL VENOUS BLD VENIPUNCTURE: CPT | Performed by: NURSE PRACTITIONER

## 2018-04-26 PROCEDURE — 99215 OFFICE O/P EST HI 40 MIN: CPT | Performed by: NURSE PRACTITIONER

## 2018-04-26 PROCEDURE — 1123F ACP DISCUSS/DSCN MKR DOCD: CPT | Performed by: NURSE PRACTITIONER

## 2018-04-26 PROCEDURE — G8417 CALC BMI ABV UP PARAM F/U: HCPCS | Performed by: NURSE PRACTITIONER

## 2018-04-26 PROCEDURE — G8427 DOCREV CUR MEDS BY ELIG CLIN: HCPCS | Performed by: NURSE PRACTITIONER

## 2018-04-26 PROCEDURE — G8598 ASA/ANTIPLAT THER USED: HCPCS | Performed by: NURSE PRACTITIONER

## 2018-04-26 PROCEDURE — 1090F PRES/ABSN URINE INCON ASSESS: CPT | Performed by: NURSE PRACTITIONER

## 2018-04-26 PROCEDURE — G8400 PT W/DXA NO RESULTS DOC: HCPCS | Performed by: NURSE PRACTITIONER

## 2018-04-26 RX ORDER — RANITIDINE 300 MG/1
300 TABLET ORAL NIGHTLY
Qty: 30 TABLET | Refills: 3 | Status: SHIPPED | OUTPATIENT
Start: 2018-04-26 | End: 2018-09-24

## 2018-04-26 ASSESSMENT — ENCOUNTER SYMPTOMS
BACK PAIN: 1
EYE PAIN: 0
ABDOMINAL DISTENTION: 0
EYE ITCHING: 0
ABDOMINAL PAIN: 0
CONSTIPATION: 1
RHINORRHEA: 0
CHOKING: 0
CHEST TIGHTNESS: 0
VOMITING: 0
NAUSEA: 0
COLOR CHANGE: 0
STRIDOR: 0
COUGH: 0
WHEEZING: 0
BLOOD IN STOOL: 0
SHORTNESS OF BREATH: 0
EYE REDNESS: 0
APNEA: 0
DIARRHEA: 0
VOICE CHANGE: 0
RECTAL PAIN: 0
EYE DISCHARGE: 0
TROUBLE SWALLOWING: 0
PHOTOPHOBIA: 0
FACIAL SWELLING: 0
SINUS PRESSURE: 0
SINUS PAIN: 0
SORE THROAT: 0
ANAL BLEEDING: 0

## 2018-04-27 LAB
A/G RATIO: 1.4 (ref 1.1–2.2)
ALBUMIN SERPL-MCNC: 4.4 G/DL (ref 3.4–5)
ALP BLD-CCNC: 66 U/L (ref 40–129)
ALT SERPL-CCNC: 10 U/L (ref 10–40)
ANION GAP SERPL CALCULATED.3IONS-SCNC: 16 MMOL/L (ref 3–16)
AST SERPL-CCNC: 17 U/L (ref 15–37)
BASOPHILS ABSOLUTE: 0.1 K/UL (ref 0–0.2)
BASOPHILS RELATIVE PERCENT: 0.5 %
BILIRUB SERPL-MCNC: 0.7 MG/DL (ref 0–1)
BUN BLDV-MCNC: 18 MG/DL (ref 7–20)
CALCIUM SERPL-MCNC: 10 MG/DL (ref 8.3–10.6)
CHLORIDE BLD-SCNC: 100 MMOL/L (ref 99–110)
CO2: 28 MMOL/L (ref 21–32)
CREAT SERPL-MCNC: 0.8 MG/DL (ref 0.6–1.2)
EOSINOPHILS ABSOLUTE: 0.1 K/UL (ref 0–0.6)
EOSINOPHILS RELATIVE PERCENT: 1.4 %
GFR AFRICAN AMERICAN: >60
GFR NON-AFRICAN AMERICAN: >60
GLOBULIN: 3.1 G/DL
GLUCOSE BLD-MCNC: 134 MG/DL (ref 70–99)
HCT VFR BLD CALC: 41.6 % (ref 36–48)
HEMOGLOBIN: 14.2 G/DL (ref 12–16)
LYMPHOCYTES ABSOLUTE: 2.8 K/UL (ref 1–5.1)
LYMPHOCYTES RELATIVE PERCENT: 29.7 %
MCH RBC QN AUTO: 30.3 PG (ref 26–34)
MCHC RBC AUTO-ENTMCNC: 34.2 G/DL (ref 31–36)
MCV RBC AUTO: 88.6 FL (ref 80–100)
MONOCYTES ABSOLUTE: 0.7 K/UL (ref 0–1.3)
MONOCYTES RELATIVE PERCENT: 7 %
NEUTROPHILS ABSOLUTE: 5.8 K/UL (ref 1.7–7.7)
NEUTROPHILS RELATIVE PERCENT: 61.4 %
PDW BLD-RTO: 15.2 % (ref 12.4–15.4)
PLATELET # BLD: 224 K/UL (ref 135–450)
PMV BLD AUTO: 8.3 FL (ref 5–10.5)
POTASSIUM SERPL-SCNC: 5.3 MMOL/L (ref 3.5–5.1)
RBC # BLD: 4.7 M/UL (ref 4–5.2)
SODIUM BLD-SCNC: 144 MMOL/L (ref 136–145)
TOTAL PROTEIN: 7.5 G/DL (ref 6.4–8.2)
TSH REFLEX FT4: 2.58 UIU/ML (ref 0.27–4.2)
VITAMIN B-12: 571 PG/ML (ref 211–911)
WBC # BLD: 9.5 K/UL (ref 4–11)

## 2018-04-27 RX ORDER — LANOLIN ALCOHOL/MO/W.PET/CERES
1000 CREAM (GRAM) TOPICAL DAILY
Qty: 30 TABLET | Refills: 3 | Status: SHIPPED | OUTPATIENT
Start: 2018-04-27 | End: 2018-08-24

## 2018-05-18 ENCOUNTER — ANTI-COAG VISIT (OUTPATIENT)
Dept: PHARMACY | Facility: CLINIC | Age: 83
End: 2018-05-18

## 2018-05-18 DIAGNOSIS — I48.91 ATRIAL FIBRILLATION WITH RAPID VENTRICULAR RESPONSE (HCC): ICD-10-CM

## 2018-05-18 LAB — INTERNATIONAL NORMALIZATION RATIO, POC: 1.9

## 2018-05-18 RX ORDER — WARFARIN SODIUM 5 MG/1
5 TABLET ORAL DAILY
Qty: 90 TABLET | Refills: 0 | OUTPATIENT
Start: 2018-05-18 | End: 2018-05-18 | Stop reason: SDUPTHER

## 2018-05-18 RX ORDER — WARFARIN SODIUM 5 MG/1
5 TABLET ORAL DAILY
Qty: 90 TABLET | Refills: 0 | OUTPATIENT
Start: 2018-05-18 | End: 2018-07-31 | Stop reason: SDUPTHER

## 2018-06-01 ENCOUNTER — ANTI-COAG VISIT (OUTPATIENT)
Dept: PHARMACY | Facility: CLINIC | Age: 83
End: 2018-06-01

## 2018-06-01 LAB — INTERNATIONAL NORMALIZATION RATIO, POC: 1.7

## 2018-06-11 ENCOUNTER — TELEPHONE (OUTPATIENT)
Dept: CARDIOLOGY CLINIC | Age: 83
End: 2018-06-11

## 2018-06-14 ENCOUNTER — CARE COORDINATION (OUTPATIENT)
Dept: CASE MANAGEMENT | Age: 83
End: 2018-06-14

## 2018-06-14 DIAGNOSIS — I10 ACCELERATED HYPERTENSION: Primary | ICD-10-CM

## 2018-06-14 PROCEDURE — 1111F DSCHRG MED/CURRENT MED MERGE: CPT | Performed by: NURSE PRACTITIONER

## 2018-06-15 ENCOUNTER — TELEPHONE (OUTPATIENT)
Dept: PRIMARY CARE CLINIC | Age: 83
End: 2018-06-15

## 2018-06-19 ENCOUNTER — TELEPHONE (OUTPATIENT)
Dept: PRIMARY CARE CLINIC | Age: 83
End: 2018-06-19

## 2018-06-19 ENCOUNTER — OFFICE VISIT (OUTPATIENT)
Dept: PRIMARY CARE CLINIC | Age: 83
End: 2018-06-19

## 2018-06-19 VITALS
SYSTOLIC BLOOD PRESSURE: 112 MMHG | HEIGHT: 63 IN | BODY MASS INDEX: 32.6 KG/M2 | WEIGHT: 184 LBS | OXYGEN SATURATION: 98 % | DIASTOLIC BLOOD PRESSURE: 74 MMHG | TEMPERATURE: 97.7 F | HEART RATE: 80 BPM

## 2018-06-19 DIAGNOSIS — Z09 HOSPITAL DISCHARGE FOLLOW-UP: ICD-10-CM

## 2018-06-19 DIAGNOSIS — I10 ESSENTIAL HYPERTENSION: ICD-10-CM

## 2018-06-19 DIAGNOSIS — N39.0 URINARY TRACT INFECTION WITHOUT HEMATURIA, SITE UNSPECIFIED: Primary | ICD-10-CM

## 2018-06-19 LAB
BILIRUBIN, POC: ABNORMAL
BLOOD URINE, POC: ABNORMAL
CLARITY, POC: ABNORMAL
COLOR, POC: ABNORMAL
GLUCOSE URINE, POC: ABNORMAL
KETONES, POC: ABNORMAL
LEUKOCYTE EST, POC: ABNORMAL
NITRITE, POC: ABNORMAL
PH, POC: 6.5
PROTEIN, POC: ABNORMAL
SPECIFIC GRAVITY, POC: 1.02
UROBILINOGEN, POC: 1

## 2018-06-19 PROCEDURE — 81002 URINALYSIS NONAUTO W/O SCOPE: CPT | Performed by: NURSE PRACTITIONER

## 2018-06-19 PROCEDURE — 99496 TRANSJ CARE MGMT HIGH F2F 7D: CPT | Performed by: NURSE PRACTITIONER

## 2018-06-19 RX ORDER — CIPROFLOXACIN 500 MG/1
250 TABLET, FILM COATED ORAL 2 TIMES DAILY
Qty: 10 TABLET | Refills: 0 | Status: SHIPPED | OUTPATIENT
Start: 2018-06-19 | End: 2018-06-24

## 2018-06-19 ASSESSMENT — ENCOUNTER SYMPTOMS
VOMITING: 0
SHORTNESS OF BREATH: 0
SINUS PRESSURE: 0
VOICE CHANGE: 0
CONSTIPATION: 1
DIARRHEA: 0
WHEEZING: 0
NAUSEA: 0
BACK PAIN: 1
EYE REDNESS: 0
EYE DISCHARGE: 0
EYE ITCHING: 0
ABDOMINAL PAIN: 0
COLOR CHANGE: 0
COUGH: 0
CHEST TIGHTNESS: 0
PHOTOPHOBIA: 0
TROUBLE SWALLOWING: 0
EYE PAIN: 0
CHOKING: 0

## 2018-06-19 ASSESSMENT — PATIENT HEALTH QUESTIONNAIRE - PHQ9
2. FEELING DOWN, DEPRESSED OR HOPELESS: 0
1. LITTLE INTEREST OR PLEASURE IN DOING THINGS: 0
SUM OF ALL RESPONSES TO PHQ QUESTIONS 1-9: 0
SUM OF ALL RESPONSES TO PHQ9 QUESTIONS 1 & 2: 0

## 2018-06-21 ENCOUNTER — TELEPHONE (OUTPATIENT)
Dept: PHARMACY | Facility: CLINIC | Age: 83
End: 2018-06-21

## 2018-06-21 LAB — URINE CULTURE, ROUTINE: NORMAL

## 2018-06-22 ENCOUNTER — ANTI-COAG VISIT (OUTPATIENT)
Dept: PHARMACY | Facility: CLINIC | Age: 83
End: 2018-06-22

## 2018-06-22 LAB — INTERNATIONAL NORMALIZATION RATIO, POC: 2.2

## 2018-06-25 ENCOUNTER — CARE COORDINATION (OUTPATIENT)
Dept: CASE MANAGEMENT | Age: 83
End: 2018-06-25

## 2018-06-29 ENCOUNTER — ANTI-COAG VISIT (OUTPATIENT)
Dept: PHARMACY | Facility: CLINIC | Age: 83
End: 2018-06-29

## 2018-06-29 LAB — INTERNATIONAL NORMALIZATION RATIO, POC: 2.5

## 2018-07-03 ENCOUNTER — CARE COORDINATION (OUTPATIENT)
Dept: CASE MANAGEMENT | Age: 83
End: 2018-07-03

## 2018-07-05 ENCOUNTER — OFFICE VISIT (OUTPATIENT)
Dept: PRIMARY CARE CLINIC | Age: 83
End: 2018-07-05

## 2018-07-05 VITALS
WEIGHT: 184.4 LBS | HEIGHT: 63 IN | SYSTOLIC BLOOD PRESSURE: 120 MMHG | OXYGEN SATURATION: 96 % | BODY MASS INDEX: 32.67 KG/M2 | DIASTOLIC BLOOD PRESSURE: 60 MMHG | HEART RATE: 76 BPM | TEMPERATURE: 98.6 F

## 2018-07-05 DIAGNOSIS — M54.16 LUMBAR RADICULOPATHY: ICD-10-CM

## 2018-07-05 DIAGNOSIS — G89.4 CHRONIC PAIN SYNDROME: ICD-10-CM

## 2018-07-05 DIAGNOSIS — G62.9 NEUROPATHY: ICD-10-CM

## 2018-07-05 DIAGNOSIS — M16.0 PRIMARY OSTEOARTHRITIS OF BOTH HIPS: ICD-10-CM

## 2018-07-05 DIAGNOSIS — M79.7 FIBROMYALGIA: ICD-10-CM

## 2018-07-05 DIAGNOSIS — R30.0 DYSURIA: Primary | ICD-10-CM

## 2018-07-05 LAB
BILIRUBIN, POC: ABNORMAL
BLOOD URINE, POC: ABNORMAL
CLARITY, POC: ABNORMAL
COLOR, POC: ABNORMAL
GLUCOSE URINE, POC: ABNORMAL
KETONES, POC: ABNORMAL
LEUKOCYTE EST, POC: ABNORMAL
NITRITE, POC: ABNORMAL
PH, POC: 7
PROTEIN, POC: ABNORMAL
SPECIFIC GRAVITY, POC: 1.01
UROBILINOGEN, POC: 0.2

## 2018-07-05 PROCEDURE — G8400 PT W/DXA NO RESULTS DOC: HCPCS | Performed by: NURSE PRACTITIONER

## 2018-07-05 PROCEDURE — 1090F PRES/ABSN URINE INCON ASSESS: CPT | Performed by: NURSE PRACTITIONER

## 2018-07-05 PROCEDURE — 4040F PNEUMOC VAC/ADMIN/RCVD: CPT | Performed by: NURSE PRACTITIONER

## 2018-07-05 PROCEDURE — 99213 OFFICE O/P EST LOW 20 MIN: CPT | Performed by: NURSE PRACTITIONER

## 2018-07-05 PROCEDURE — G8427 DOCREV CUR MEDS BY ELIG CLIN: HCPCS | Performed by: NURSE PRACTITIONER

## 2018-07-05 PROCEDURE — G8417 CALC BMI ABV UP PARAM F/U: HCPCS | Performed by: NURSE PRACTITIONER

## 2018-07-05 PROCEDURE — G8598 ASA/ANTIPLAT THER USED: HCPCS | Performed by: NURSE PRACTITIONER

## 2018-07-05 PROCEDURE — 1123F ACP DISCUSS/DSCN MKR DOCD: CPT | Performed by: NURSE PRACTITIONER

## 2018-07-05 PROCEDURE — 1036F TOBACCO NON-USER: CPT | Performed by: NURSE PRACTITIONER

## 2018-07-05 PROCEDURE — 81002 URINALYSIS NONAUTO W/O SCOPE: CPT | Performed by: NURSE PRACTITIONER

## 2018-07-05 RX ORDER — OXYCODONE AND ACETAMINOPHEN 7.5; 325 MG/1; MG/1
1 TABLET ORAL 2 TIMES DAILY
Qty: 60 TABLET | Refills: 0 | Status: SHIPPED | OUTPATIENT
Start: 2018-07-05 | End: 2018-09-28 | Stop reason: SDUPTHER

## 2018-07-05 RX ORDER — OXYCODONE HCL 10 MG/1
10 TABLET, FILM COATED, EXTENDED RELEASE ORAL EVERY 12 HOURS
Qty: 60 TABLET | Refills: 0 | Status: SHIPPED | OUTPATIENT
Start: 2018-08-18 | End: 2018-08-24 | Stop reason: SDUPTHER

## 2018-07-05 RX ORDER — OXYCODONE HCL 10 MG/1
10 TABLET, FILM COATED, EXTENDED RELEASE ORAL EVERY 12 HOURS
Qty: 60 TABLET | Refills: 0 | Status: SHIPPED | OUTPATIENT
Start: 2018-07-19 | End: 2018-09-28 | Stop reason: SDUPTHER

## 2018-07-05 RX ORDER — OXYCODONE AND ACETAMINOPHEN 7.5; 325 MG/1; MG/1
1 TABLET ORAL 2 TIMES DAILY
Qty: 60 TABLET | Refills: 0 | Status: SHIPPED | OUTPATIENT
Start: 2018-08-04 | End: 2018-08-24 | Stop reason: SDUPTHER

## 2018-07-05 RX ORDER — OXYCODONE AND ACETAMINOPHEN 7.5; 325 MG/1; MG/1
1 TABLET ORAL 2 TIMES DAILY
Qty: 60 TABLET | Refills: 0 | Status: SHIPPED | OUTPATIENT
Start: 2018-09-03 | End: 2018-09-28 | Stop reason: SDUPTHER

## 2018-07-05 RX ORDER — OXYCODONE HCL 10 MG/1
10 TABLET, FILM COATED, EXTENDED RELEASE ORAL EVERY 12 HOURS
Qty: 60 TABLET | Refills: 0 | Status: SHIPPED | OUTPATIENT
Start: 2018-09-17 | End: 2018-09-28 | Stop reason: SDUPTHER

## 2018-07-05 ASSESSMENT — ENCOUNTER SYMPTOMS
ABDOMINAL PAIN: 0
NAUSEA: 0
CHOKING: 0
CHEST TIGHTNESS: 0
TROUBLE SWALLOWING: 0
CONSTIPATION: 1
VOMITING: 0
WHEEZING: 0
SHORTNESS OF BREATH: 0
VOICE CHANGE: 0
DIARRHEA: 0
BACK PAIN: 1
COUGH: 0
COLOR CHANGE: 0
PHOTOPHOBIA: 0

## 2018-07-05 NOTE — PROGRESS NOTES
myalgias. Negative for joint swelling, neck pain and neck stiffness. Bilateral shoulder pain (R>L)    Fibromyalgia    Chronic left hip pain       Skin: Negative for color change, rash and wound. Neurological: Negative for weakness, light-headedness and headaches. Hematological: Does not bruise/bleed easily. On coumadin for A fib   Psychiatric/Behavioral: Negative for agitation, behavioral problems, confusion, decreased concentration, dysphoric mood, self-injury, sleep disturbance and suicidal ideas. The patient is not nervous/anxious. Objective:   Physical Exam   Constitutional: She is oriented to person, place, and time. She appears well-developed and well-nourished. No distress. HENT:   Head: Normocephalic and atraumatic. Eyes: Conjunctivae are normal. Pupils are equal, round, and reactive to light. Right eye exhibits no discharge. Left eye exhibits no discharge. No scleral icterus. Neck: Normal range of motion. Neck supple. No tracheal deviation present. No thyromegaly present. No bruits     Cardiovascular: Normal rate, regular rhythm, normal heart sounds and intact distal pulses. Exam reveals no gallop and no friction rub. No murmur heard. Regular rate; irregular rhythm. Pulmonary/Chest: Effort normal and breath sounds normal. No respiratory distress. She has no wheezes. She has no rales. Abdominal: Soft. She exhibits no distension and no mass. There is no tenderness. There is no guarding. Musculoskeletal: She exhibits tenderness. She exhibits no edema. She walks with a cane or walker    OA changes noted in bilateral hands. Neurological: She is alert and oriented to person, place, and time. She exhibits normal muscle tone. Coordination normal.   Skin: Skin is warm and dry. No rash noted. She is not diaphoretic. No erythema. Psychiatric: She has a normal mood and affect.  Her behavior is normal. Thought content normal.   Nursing note and vitals reviewed. Assessment:       Diagnosis Orders   1. Dysuria  POCT Urinalysis no Micro    URINE CULTURE   2. Chronic pain syndrome  oxyCODONE (OXYCONTIN) 10 MG extended release tablet    oxyCODONE (OXYCONTIN) 10 MG extended release tablet    oxyCODONE (OXYCONTIN) 10 MG extended release tablet    oxyCODONE-acetaminophen (PERCOCET) 7.5-325 MG per tablet    oxyCODONE-acetaminophen (PERCOCET) 7.5-325 MG per tablet    oxyCODONE-acetaminophen (PERCOCET) 7.5-325 MG per tablet   3. Primary osteoarthritis of both hips  oxyCODONE (OXYCONTIN) 10 MG extended release tablet    oxyCODONE (OXYCONTIN) 10 MG extended release tablet    oxyCODONE (OXYCONTIN) 10 MG extended release tablet    oxyCODONE-acetaminophen (PERCOCET) 7.5-325 MG per tablet    oxyCODONE-acetaminophen (PERCOCET) 7.5-325 MG per tablet    oxyCODONE-acetaminophen (PERCOCET) 7.5-325 MG per tablet   4. Fibromyalgia  oxyCODONE (OXYCONTIN) 10 MG extended release tablet    oxyCODONE (OXYCONTIN) 10 MG extended release tablet    oxyCODONE (OXYCONTIN) 10 MG extended release tablet    oxyCODONE-acetaminophen (PERCOCET) 7.5-325 MG per tablet    oxyCODONE-acetaminophen (PERCOCET) 7.5-325 MG per tablet    oxyCODONE-acetaminophen (PERCOCET) 7.5-325 MG per tablet   5. Lumbar radiculopathy  oxyCODONE (OXYCONTIN) 10 MG extended release tablet    oxyCODONE (OXYCONTIN) 10 MG extended release tablet    oxyCODONE (OXYCONTIN) 10 MG extended release tablet    oxyCODONE-acetaminophen (PERCOCET) 7.5-325 MG per tablet    oxyCODONE-acetaminophen (PERCOCET) 7.5-325 MG per tablet    oxyCODONE-acetaminophen (PERCOCET) 7.5-325 MG per tablet   6.  Neuropathy (HCC)  oxyCODONE (OXYCONTIN) 10 MG extended release tablet    oxyCODONE (OXYCONTIN) 10 MG extended release tablet    oxyCODONE (OXYCONTIN) 10 MG extended release tablet    oxyCODONE-acetaminophen (PERCOCET) 7.5-325 MG per tablet    oxyCODONE-acetaminophen (PERCOCET) 7.5-325 MG per tablet    oxyCODONE-acetaminophen (PERCOCET) 7.5-325 MG per

## 2018-07-07 LAB — URINE CULTURE, ROUTINE: NORMAL

## 2018-07-14 DIAGNOSIS — I50.22 CHRONIC SYSTOLIC CONGESTIVE HEART FAILURE (HCC): Primary | ICD-10-CM

## 2018-07-14 DIAGNOSIS — I10 ESSENTIAL HYPERTENSION: ICD-10-CM

## 2018-07-14 RX ORDER — CARVEDILOL 25 MG/1
25 TABLET ORAL 2 TIMES DAILY WITH MEALS
Qty: 180 TABLET | Refills: 3 | Status: SHIPPED | OUTPATIENT
Start: 2018-07-14 | End: 2018-11-23 | Stop reason: DRUGHIGH

## 2018-07-20 ENCOUNTER — ANTI-COAG VISIT (OUTPATIENT)
Dept: PHARMACY | Facility: CLINIC | Age: 83
End: 2018-07-20

## 2018-07-20 LAB — INTERNATIONAL NORMALIZATION RATIO, POC: 2.8

## 2018-07-20 NOTE — PROGRESS NOTES
Ms. Miya Veras is a 80 y.o.  female with history of A-Fib with RVR who presents today for anticoagulation monitoring and adjustment. Patient verifies current dosing regimen  Patient denies s/s bleeding/bruising/swelling/SOB  No blood in urine or stool. No dietary changes. No changes in medication/OTC agents/Herbals. No change in alcohol use. No missed doses. No Procedures scheduled in the future at this time. Lab Results   Component Value Date    INR 2.8 07/20/2018    INR 2.5 06/29/2018    INR 2.2 06/22/2018       Pertinent findings:   Patient states had an episode of A-Fib today. She will not go to the emergency room. No complaints regarding warfarin at this time. Continued her weekly warfarin dosing of 5 mg daily except 7.5 mg on Fridays. Has stopped the metoprolol and is now using carvedilol 25 mg BID and amlodipine 5 mg daily. Next INR in 4 weeks. Today's BP = 142/82   Pulse = 91    After visit summary printed and reviewed with patient.       Medications reviewed and updated on home medication list: YES    Warfarin dose updated on patient home medication list: No

## 2018-07-31 ENCOUNTER — TELEPHONE (OUTPATIENT)
Dept: PHARMACY | Facility: CLINIC | Age: 83
End: 2018-07-31

## 2018-07-31 RX ORDER — WARFARIN SODIUM 5 MG/1
TABLET ORAL
Qty: 100 TABLET | Refills: 1 | Status: SHIPPED | OUTPATIENT
Start: 2018-07-31 | End: 2019-02-14 | Stop reason: SDUPTHER

## 2018-07-31 NOTE — TELEPHONE ENCOUNTER
Medication Refill    When was your last appointment with cardiology?  (if 1year or longer, please schedule an appointment)    Medication needing refilled: Amlodipine    Doseage of the medication: 10 mg     How are you taking this medication (QD, BID, TID, QID, PRN):  Take 0.5 tablets by mouth daily    Patient want a 30 or 90 day supply called in: 80    Which Pharmacy are we sending the medication to:      [place] [address] [place] [facility], [address] [facility] [phone] - F [phone]

## 2018-08-01 RX ORDER — AMLODIPINE BESYLATE 10 MG/1
5 TABLET ORAL DAILY
Qty: 45 TABLET | Refills: 3 | Status: SHIPPED | OUTPATIENT
Start: 2018-08-01 | End: 2019-06-27 | Stop reason: SDUPTHER

## 2018-08-17 ENCOUNTER — ANTI-COAG VISIT (OUTPATIENT)
Dept: PHARMACY | Facility: CLINIC | Age: 83
End: 2018-08-17

## 2018-08-17 LAB — INTERNATIONAL NORMALIZATION RATIO, POC: 1.5

## 2018-08-17 NOTE — PROGRESS NOTES
Ms. Shannon Anaya is a 80 y.o.  female with history of A-Fib with RVR who presents today for anticoagulation monitoring and adjustment. Patient verifies current dosing regimen  Patient denies s/s bleeding/bruising/swelling/SOB  No blood in urine or stool. No dietary changes. No changes in medication/OTC agents/Herbals. No change in alcohol use. No missed dose/doubled dosed  No Procedures scheduled in the future at this time. Lab Results   Component Value Date    INR 1.5 08/17/2018    INR 2.8 07/20/2018    INR 2.5 06/29/2018       Pertinent findings:   Patient states she is about the same and does not elaborate. No S/S of bruising or bleeding due to warfarin therapy. No therapy complaints at this time. Patient did miss taking her dose on Wednesday 8-15-18 and then took two doses for a total of 10 mg on Thursday 8-16-18 to make up for it. She has been instructed not to double dose if she misses a dose. Patient to continue warfarin at 5 mg daily except 7.5 mg on Friday. Did not give an increased dose of warfarin today due to the 1.5 INR because she double dosed yesterday. Next INR in 4 weeks. BP = 138/77  Pulse = 96    After visit summary printed and reviewed with patient.       Medications reviewed and updated on home medication list:NO     Warfarin dose updated on patient home medication list: NO

## 2018-08-24 ENCOUNTER — OFFICE VISIT (OUTPATIENT)
Dept: CARDIOLOGY CLINIC | Age: 83
End: 2018-08-24

## 2018-08-24 VITALS
WEIGHT: 186 LBS | HEIGHT: 63 IN | OXYGEN SATURATION: 95 % | BODY MASS INDEX: 32.96 KG/M2 | HEART RATE: 76 BPM | DIASTOLIC BLOOD PRESSURE: 66 MMHG | SYSTOLIC BLOOD PRESSURE: 126 MMHG

## 2018-08-24 DIAGNOSIS — R06.02 SHORTNESS OF BREATH: Primary | ICD-10-CM

## 2018-08-24 DIAGNOSIS — I10 ESSENTIAL HYPERTENSION: ICD-10-CM

## 2018-08-24 DIAGNOSIS — I48.20 ATRIAL FIBRILLATION, CHRONIC (HCC): ICD-10-CM

## 2018-08-24 DIAGNOSIS — E78.2 MIXED HYPERLIPIDEMIA: ICD-10-CM

## 2018-08-24 PROCEDURE — 1123F ACP DISCUSS/DSCN MKR DOCD: CPT | Performed by: INTERNAL MEDICINE

## 2018-08-24 PROCEDURE — G8400 PT W/DXA NO RESULTS DOC: HCPCS | Performed by: INTERNAL MEDICINE

## 2018-08-24 PROCEDURE — 1101F PT FALLS ASSESS-DOCD LE1/YR: CPT | Performed by: INTERNAL MEDICINE

## 2018-08-24 PROCEDURE — 4040F PNEUMOC VAC/ADMIN/RCVD: CPT | Performed by: INTERNAL MEDICINE

## 2018-08-24 PROCEDURE — G8417 CALC BMI ABV UP PARAM F/U: HCPCS | Performed by: INTERNAL MEDICINE

## 2018-08-24 PROCEDURE — G8427 DOCREV CUR MEDS BY ELIG CLIN: HCPCS | Performed by: INTERNAL MEDICINE

## 2018-08-24 PROCEDURE — G8598 ASA/ANTIPLAT THER USED: HCPCS | Performed by: INTERNAL MEDICINE

## 2018-08-24 PROCEDURE — 1036F TOBACCO NON-USER: CPT | Performed by: INTERNAL MEDICINE

## 2018-08-24 PROCEDURE — 99214 OFFICE O/P EST MOD 30 MIN: CPT | Performed by: INTERNAL MEDICINE

## 2018-08-24 PROCEDURE — 1090F PRES/ABSN URINE INCON ASSESS: CPT | Performed by: INTERNAL MEDICINE

## 2018-08-24 PROCEDURE — 93000 ELECTROCARDIOGRAM COMPLETE: CPT | Performed by: INTERNAL MEDICINE

## 2018-08-24 NOTE — PATIENT INSTRUCTIONS
Patient Education        Atrial Fibrillation: Care Instructions  Your Care Instructions    Atrial fibrillation is an irregular and often fast heartbeat. Treating this condition is important for several reasons. It can cause blood clots, which can travel from your heart to your brain and cause a stroke. If you have a fast heartbeat, you may feel lightheaded, dizzy, and weak. An irregular heartbeat can also increase your risk for heart failure. Atrial fibrillation is often the result of another heart condition, such as high blood pressure or coronary artery disease. Making changes to improve your heart condition will help you stay healthy and active. Follow-up care is a key part of your treatment and safety. Be sure to make and go to all appointments, and call your doctor if you are having problems. It's also a good idea to know your test results and keep a list of the medicines you take. How can you care for yourself at home? Medicines    · Take your medicines exactly as prescribed. Call your doctor if you think you are having a problem with your medicine. You will get more details on the specific medicines your doctor prescribes.     · If your doctor has given you a blood thinner to prevent a stroke, be sure you get instructions about how to take your medicine safely. Blood thinners can cause serious bleeding problems.     · Do not take any vitamins, over-the-counter drugs, or herbal products without talking to your doctor first.    Lifestyle changes    · Do not smoke. Smoking can increase your chance of a stroke and heart attack. If you need help quitting, talk to your doctor about stop-smoking programs and medicines. These can increase your chances of quitting for good.     · Eat a heart-healthy diet.     · Stay at a healthy weight. Lose weight if you need to.     · Limit alcohol to 2 drinks a day for men and 1 drink a day for women. Too much alcohol can cause health problems.     · Avoid colds and flu.  Get a pneumococcal vaccine shot. If you have had one before, ask your doctor whether you need another dose. Get a flu shot every year. If you must be around people with colds or flu, wash your hands often. Activity    · If your doctor recommends it, get more exercise. Walking is a good choice. Bit by bit, increase the amount you walk every day. Try for at least 30 minutes on most days of the week. You also may want to swim, bike, or do other activities. Your doctor may suggest that you join a cardiac rehabilitation program so that you can have help increasing your physical activity safely.     · Start light exercise if your doctor says it is okay. Even a small amount will help you get stronger, have more energy, and manage stress. Walking is an easy way to get exercise. Start out by walking a little more than you did in the hospital. Gradually increase the amount you walk.     · When you exercise, watch for signs that your heart is working too hard. You are pushing too hard if you cannot talk while you are exercising. If you become short of breath or dizzy or have chest pain, sit down and rest immediately.     · Check your pulse regularly. Place two fingers on the artery at the palm side of your wrist, in line with your thumb. If your heartbeat seems uneven or fast, talk to your doctor. When should you call for help? Call 911 anytime you think you may need emergency care. For example, call if:    · You have symptoms of a heart attack. These may include:  ¨ Chest pain or pressure, or a strange feeling in the chest.  ¨ Sweating. ¨ Shortness of breath. ¨ Nausea or vomiting. ¨ Pain, pressure, or a strange feeling in the back, neck, jaw, or upper belly or in one or both shoulders or arms. ¨ Lightheadedness or sudden weakness. ¨ A fast or irregular heartbeat. After you call 911, the  may tell you to chew 1 adult-strength or 2 to 4 low-dose aspirin. Wait for an ambulance.  Do not try to drive yourself.     · You have symptoms of a stroke. These may include:  ¨ Sudden numbness, tingling, weakness, or loss of movement in your face, arm, or leg, especially on only one side of your body. ¨ Sudden vision changes. ¨ Sudden trouble speaking. ¨ Sudden confusion or trouble understanding simple statements. ¨ Sudden problems with walking or balance. ¨ A sudden, severe headache that is different from past headaches.     · You passed out (lost consciousness).    Call your doctor now or seek immediate medical care if:    · You have new or increased shortness of breath.     · You feel dizzy or lightheaded, or you feel like you may faint.     · Your heart rate becomes irregular.     · You can feel your heart flutter in your chest or skip heartbeats. Tell your doctor if these symptoms are new or worse.    Watch closely for changes in your health, and be sure to contact your doctor if you have any problems. Where can you learn more? Go to [email or web address]. org and sign in to your [employer] account. Enter U020 in the [employer] box to learn more about \"Atrial Fibrillation: Care Instructions. \"     If you do not have an account, please click on the \"Sign Up Now\" link. Current as of: December 6, 2017  Content Version: 11.7  © 4386-3411 [employer]. Care instructions adapted under license by [facility] ([facility]). If you have questions about a medical condition or this instruction, always ask your healthcare professional. [place] [address] any warranty or liability for your use of this information.

## 2018-08-24 NOTE — PROGRESS NOTES
CHOLECYSTECTOMY      COLONOSCOPY  05.01.03    COLONOSCOPY  02/2017    no need for further colonoscopy per Dr. Alejandro Reid Left 01/2016     Left intertrochanteric and shaft femur comminuted fracture, status post Gamma nail and cables.  SHOULDER SURGERY      right      Family History   Problem Relation Age of Onset    Stroke Mother     COPD Father     Coronary Art Dis Father     Other Brother         AAA rupture    Other Brother         cerebral hemorrhage    Heart Attack Son         cerebral hemorrhage as well     Social History   Substance Use Topics    Smoking status: Former Smoker     Packs/day: 1.00     Years: 20.00     Types: Cigarettes     Quit date: 1/1/1980    Smokeless tobacco: Never Used    Alcohol use No       Allergies   Allergen Reactions    Levaquin [Levofloxacin In D5w] Nausea And Vomiting    Digoxin Other (See Comments)     Pt states had to go er due to it affected her heart in a bad way-can't recall what type of reaction     Current Outpatient Prescriptions   Medication Sig Dispense Refill    Calcium Carbonate Antacid (MAALOX PO) Take by mouth as needed      amLODIPine (NORVASC) 10 MG tablet Take 0.5 tablets by mouth daily 45 tablet 3    warfarin (COUMADIN) 5 MG tablet Take 5mg by mouth daily EXCEPT 7.5mg every Friday or as directed by [facility] Coumadin Service [phone] tablet 1    carvedilol (COREG) 25 MG tablet Take 1 tablet by mouth 2 times daily (with meals) 180 tablet 3    [START ON 9/17/2018] oxyCODONE (OXYCONTIN) 10 MG extended release tablet Take 1 tablet by mouth every 12 hours for 30 days. . 60 tablet 0    [START ON 9/3/2018] oxyCODONE-acetaminophen (PERCOCET) 7.5-325 MG per tablet Take 1 tablet by mouth 2 times daily for 30 days. . 60 tablet 0    ranitidine (ZANTAC) 300 MG tablet Take 1 tablet by mouth nightly (Patient taking differently: Take 300 mg by mouth nightly as needed for Heartburn ) 30 tablet 3    lisinopril (PRINIVIL;ZESTRIL) 20

## 2018-08-27 DIAGNOSIS — I10 ESSENTIAL HYPERTENSION: ICD-10-CM

## 2018-08-27 RX ORDER — LISINOPRIL 20 MG/1
20 TABLET ORAL DAILY
Qty: 90 TABLET | Refills: 3 | Status: SHIPPED | OUTPATIENT
Start: 2018-08-27 | End: 2019-06-27 | Stop reason: SDUPTHER

## 2018-09-14 ENCOUNTER — ANTI-COAG VISIT (OUTPATIENT)
Dept: PHARMACY | Facility: CLINIC | Age: 83
End: 2018-09-14

## 2018-09-14 LAB — INTERNATIONAL NORMALIZATION RATIO, POC: 1.7

## 2018-09-14 NOTE — PROGRESS NOTES
Ms. Shannon Anaya is a 80 y.o.  female with history of A-Fib with RVR who presents today for anticoagulation monitoring and adjustment. Patient verifies current dosing regimen  Patient denies s/s bleeding/bruising/swelling/SOB  No blood in urine or stool. No dietary changes. No changes in medication/OTC agents/Herbals. No change in alcohol use. No missed doses. No Procedures scheduled in the future at this time. Lab Results   Component Value Date    INR 1.7 09/14/2018    INR 1.5 08/17/2018    INR 2.8 07/20/2018       Pertinent findings:   Patient states having issues with her HBP medicationis making her tired. She takes them all in the morning at one time. Discussed how to space them out during the day to achieve better results. Gave a 10 mg dose of warfarin today, then increased her weekly warfarin dosing by 7% (2.5 mg/week) to 5 mg daily except 7.5 mg on Mondays and Fridays. Next INR in three weeks. After visit summary printed and reviewed with patient.       Medications reviewed and updated on home medication list: YES       Warfarin dose updated on patient home medication list: YES

## 2018-09-28 ENCOUNTER — OFFICE VISIT (OUTPATIENT)
Dept: PRIMARY CARE CLINIC | Age: 83
End: 2018-09-28
Payer: MEDICARE

## 2018-09-28 VITALS
OXYGEN SATURATION: 96 % | SYSTOLIC BLOOD PRESSURE: 124 MMHG | DIASTOLIC BLOOD PRESSURE: 80 MMHG | HEIGHT: 63 IN | BODY MASS INDEX: 32.78 KG/M2 | HEART RATE: 80 BPM | TEMPERATURE: 98.6 F | WEIGHT: 185 LBS

## 2018-09-28 DIAGNOSIS — I10 ESSENTIAL HYPERTENSION: ICD-10-CM

## 2018-09-28 DIAGNOSIS — E53.8 LOW SERUM VITAMIN B12: Primary | ICD-10-CM

## 2018-09-28 DIAGNOSIS — M16.0 PRIMARY OSTEOARTHRITIS OF BOTH HIPS: ICD-10-CM

## 2018-09-28 DIAGNOSIS — M54.16 LUMBAR RADICULOPATHY: ICD-10-CM

## 2018-09-28 DIAGNOSIS — G62.9 NEUROPATHY: ICD-10-CM

## 2018-09-28 DIAGNOSIS — I48.91 ATRIAL FIBRILLATION WITH RAPID VENTRICULAR RESPONSE (HCC): ICD-10-CM

## 2018-09-28 DIAGNOSIS — I50.9 CONGESTIVE HEART FAILURE, UNSPECIFIED HF CHRONICITY, UNSPECIFIED HEART FAILURE TYPE (HCC): ICD-10-CM

## 2018-09-28 DIAGNOSIS — Z79.899 LONG TERM USE OF DRUG: ICD-10-CM

## 2018-09-28 DIAGNOSIS — M79.7 FIBROMYALGIA: ICD-10-CM

## 2018-09-28 DIAGNOSIS — G89.4 CHRONIC PAIN SYNDROME: ICD-10-CM

## 2018-09-28 DIAGNOSIS — R35.0 URINE FREQUENCY: ICD-10-CM

## 2018-09-28 LAB
AMPHETAMINE SCREEN, URINE: NORMAL
BARBITURATE SCREEN, URINE: NORMAL
BENZODIAZEPINE SCREEN, URINE: NORMAL
BILIRUBIN, POC: NORMAL
BLOOD URINE, POC: NORMAL
BUPRENORPHINE URINE: NORMAL
CLARITY, POC: NORMAL
COCAINE METABOLITE SCREEN URINE: NORMAL
COLOR, POC: NORMAL
GABAPENTIN SCREEN, URINE: NORMAL
GLUCOSE URINE, POC: NORMAL
KETONES, POC: NORMAL
LEUKOCYTE EST, POC: NORMAL
METHADONE SCREEN, URINE: NORMAL
METHAMPHETAMINE, URINE: NORMAL
NITRITE, POC: NORMAL
OPIATE SCREEN URINE: NORMAL
OXYCODONE SCREEN URINE: NORMAL
PH, POC: 7
PHENCYCLIDINE SCREEN URINE: NORMAL
PROPOXYPHENE SCREEN, URINE: NORMAL
PROTEIN, POC: NORMAL
SPECIFIC GRAVITY, POC: 1.01
THC SCREEN, URINE: NORMAL
TRICYCLIC ANTIDEPRESSANTS, UR: NORMAL
UROBILINOGEN, POC: 1

## 2018-09-28 PROCEDURE — G8417 CALC BMI ABV UP PARAM F/U: HCPCS | Performed by: NURSE PRACTITIONER

## 2018-09-28 PROCEDURE — 1101F PT FALLS ASSESS-DOCD LE1/YR: CPT | Performed by: NURSE PRACTITIONER

## 2018-09-28 PROCEDURE — 1123F ACP DISCUSS/DSCN MKR DOCD: CPT | Performed by: NURSE PRACTITIONER

## 2018-09-28 PROCEDURE — 99214 OFFICE O/P EST MOD 30 MIN: CPT | Performed by: NURSE PRACTITIONER

## 2018-09-28 PROCEDURE — 81002 URINALYSIS NONAUTO W/O SCOPE: CPT | Performed by: NURSE PRACTITIONER

## 2018-09-28 PROCEDURE — 80305 DRUG TEST PRSMV DIR OPT OBS: CPT | Performed by: NURSE PRACTITIONER

## 2018-09-28 PROCEDURE — 1090F PRES/ABSN URINE INCON ASSESS: CPT | Performed by: NURSE PRACTITIONER

## 2018-09-28 PROCEDURE — G8400 PT W/DXA NO RESULTS DOC: HCPCS | Performed by: NURSE PRACTITIONER

## 2018-09-28 PROCEDURE — G8598 ASA/ANTIPLAT THER USED: HCPCS | Performed by: NURSE PRACTITIONER

## 2018-09-28 PROCEDURE — 1036F TOBACCO NON-USER: CPT | Performed by: NURSE PRACTITIONER

## 2018-09-28 PROCEDURE — G8427 DOCREV CUR MEDS BY ELIG CLIN: HCPCS | Performed by: NURSE PRACTITIONER

## 2018-09-28 PROCEDURE — 4040F PNEUMOC VAC/ADMIN/RCVD: CPT | Performed by: NURSE PRACTITIONER

## 2018-09-28 RX ORDER — OXYCODONE HCL 10 MG/1
10 TABLET, FILM COATED, EXTENDED RELEASE ORAL EVERY 12 HOURS
Qty: 60 TABLET | Refills: 0 | Status: SHIPPED | OUTPATIENT
Start: 2018-10-10 | End: 2018-10-31

## 2018-09-28 RX ORDER — THIAMINE HCL 100 MG
2500 TABLET ORAL DAILY
Qty: 30 TABLET | Refills: 5 | Status: SHIPPED | OUTPATIENT
Start: 2018-09-28 | End: 2019-03-29

## 2018-09-28 RX ORDER — OXYCODONE HCL 10 MG/1
10 TABLET, FILM COATED, EXTENDED RELEASE ORAL EVERY 12 HOURS
Qty: 60 TABLET | Refills: 0 | Status: SHIPPED | OUTPATIENT
Start: 2018-12-19 | End: 2018-12-24 | Stop reason: SDUPTHER

## 2018-09-28 RX ORDER — OXYCODONE AND ACETAMINOPHEN 7.5; 325 MG/1; MG/1
1 TABLET ORAL 2 TIMES DAILY
Qty: 60 TABLET | Refills: 0 | Status: SHIPPED | OUTPATIENT
Start: 2018-12-02 | End: 2018-11-23 | Stop reason: SDUPTHER

## 2018-09-28 RX ORDER — FEEDER CONTAINER WITH PUMP SET
1 EACH MISCELLANEOUS DAILY
Qty: 30 CAN | Refills: 5 | Status: SHIPPED | OUTPATIENT
Start: 2018-09-28 | End: 2018-10-31

## 2018-09-28 RX ORDER — OXYCODONE HCL 10 MG/1
10 TABLET, FILM COATED, EXTENDED RELEASE ORAL EVERY 12 HOURS
Qty: 60 TABLET | Refills: 0 | Status: SHIPPED | OUTPATIENT
Start: 2018-11-19 | End: 2018-10-31

## 2018-09-28 RX ORDER — OXYCODONE AND ACETAMINOPHEN 7.5; 325 MG/1; MG/1
1 TABLET ORAL 2 TIMES DAILY
Qty: 60 TABLET | Refills: 0 | Status: SHIPPED | OUTPATIENT
Start: 2018-11-02 | End: 2018-10-31

## 2018-09-28 RX ORDER — OXYCODONE AND ACETAMINOPHEN 7.5; 325 MG/1; MG/1
1 TABLET ORAL 2 TIMES DAILY
Qty: 60 TABLET | Refills: 0 | Status: SHIPPED | OUTPATIENT
Start: 2018-10-03 | End: 2018-10-31

## 2018-09-28 ASSESSMENT — ENCOUNTER SYMPTOMS
CHOKING: 0
COUGH: 0
DIARRHEA: 0
PHOTOPHOBIA: 0
WHEEZING: 0
ABDOMINAL PAIN: 0
SHORTNESS OF BREATH: 0
CHEST TIGHTNESS: 0
NAUSEA: 0
VOICE CHANGE: 0
TROUBLE SWALLOWING: 0
VOMITING: 0
COLOR CHANGE: 0
ALLERGIC/IMMUNOLOGIC NEGATIVE: 1
BACK PAIN: 1
CONSTIPATION: 1

## 2018-09-28 NOTE — PROGRESS NOTES
Subjective:      Patient ID: Bailey Napier is a 80 y.o. female. 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 Her only complaint is of her gradually worsening pain, stating that as the weather gets colder, her pain gets worse. She denies chest pain. She has chronic exertional shortness of breath that is unchanged. She complains of urinary frequency. No dysuria or hematuria. No chills fevers or night sweats. She is asking about taking b12 for energy. I have reminded her that this was suggested to her in the past, but she reports that she did not realize she was to get the med over the counter. Past Medical History:   Diagnosis Date    A fib     Arthritis     [facility]ch[facility] obstructive [facility] disease[facility])     Coronary artery disease     Femur fracture ([address] 01/2016    left    Fibromyalgia     GERD (gastroesophageal reflux disease)     History of shingles     Hypertension     PONV (postoperative nausea and vomiting)     Rotator cuff disorder     left     Ulcer of gastroesophageal junction 2006           Review of Systems   Constitutional: Negative for activity change, appetite change, chills, diaphoresis, fatigue and fever. HENT: Negative for congestion, trouble swallowing and voice change. Eyes: Negative for photophobia and visual disturbance. Respiratory: Negative for cough, choking, chest tightness, shortness of breath and wheezing. COPD   Cardiovascular: Negative for chest pain, palpitations and leg swelling. HTN, CAD, Paroxysmal Atrial fibrillation, CHF>>[facility]. INR's managed at coumadin clinic   Gastrointestinal: Positive for constipation (due to narcotics>>managed with prn miralax). Negative for abdominal pain, diarrhea, nausea and vomiting. Endocrine: Negative for polydipsia, polyphagia and polyuria.    Genitourinary: Negative for decreased urine 7.5-325 MG per tablet    oxyCODONE (OXYCONTIN) 10 MG extended release tablet   7. Urine frequency  POCT Urinalysis no Micro   8. Atrial fibrillation with rapid ventricular response ([address]     9. Essential hypertension     10. Long term use of drug  POCT Rapid Drug Screen   11. Congestive heart failure, unspecified HF chronicity, unspecified heart failure type ([address]       Low b12 with fatigue and weakness>>will see if supplemental B12 will be of benefit. Chronic pain syndrome due to severe djd, neuralgia and fibromyalgia>>chronic and unchanged. Controlled to a degree with pain meds. May consider using gabapentin in addition to pain meds. She declines a referral to pain management. Controlled Substances Monitoring:     RX Monitoring 9/28/2018   Attestation The Prescription Monitoring Report for this patient was reviewed today. Documentation Random urine drug screen sent today. ;No signs of potential drug abuse or diversion identified. Chronic Pain Dose reduction has been attempted. Medication Contracts Existing medication contract. Urinary frequency>>urinalysis today is normal.    A fib>>continue coumadin and regular monitoring at coumadin clinic. Follow up with cardiology as instructed. CHF>>chronic. This is compensated. Plan:        Discussed use, benefit, and side effects of prescribed medications. Barriers to medication compliance addressed. All patient questions answered. Pt voiced understanding.            Adithya Torres, ANDREZ - CNP

## 2018-10-12 ENCOUNTER — ANTI-COAG VISIT (OUTPATIENT)
Dept: PHARMACY | Age: 83
End: 2018-10-12
Payer: MEDICARE

## 2018-10-12 LAB — INTERNATIONAL NORMALIZATION RATIO, POC: 2.6

## 2018-10-12 PROCEDURE — 99211 OFF/OP EST MAY X REQ PHY/QHP: CPT

## 2018-10-12 PROCEDURE — 85610 PROTHROMBIN TIME: CPT

## 2018-10-31 ENCOUNTER — ANESTHESIA EVENT (OUTPATIENT)
Dept: ENDOSCOPY | Age: 83
End: 2018-10-31
Payer: MEDICARE

## 2018-11-01 ENCOUNTER — ANESTHESIA (OUTPATIENT)
Dept: ENDOSCOPY | Age: 83
End: 2018-11-01
Payer: MEDICARE

## 2018-11-01 ENCOUNTER — HOSPITAL ENCOUNTER (OUTPATIENT)
Age: 83
Setting detail: OUTPATIENT SURGERY
Discharge: HOME | End: 2018-11-01
Attending: INTERNAL MEDICINE | Admitting: INTERNAL MEDICINE
Payer: MEDICARE

## 2018-11-01 VITALS
DIASTOLIC BLOOD PRESSURE: 81 MMHG | TEMPERATURE: 97.3 F | HEIGHT: 63 IN | SYSTOLIC BLOOD PRESSURE: 176 MMHG | BODY MASS INDEX: 32.78 KG/M2 | WEIGHT: 185 LBS | RESPIRATION RATE: 16 BRPM | OXYGEN SATURATION: 96 % | HEART RATE: 72 BPM

## 2018-11-01 VITALS
DIASTOLIC BLOOD PRESSURE: 63 MMHG | RESPIRATION RATE: 2 BRPM | SYSTOLIC BLOOD PRESSURE: 101 MMHG | TEMPERATURE: 98.1 F | OXYGEN SATURATION: 100 %

## 2018-11-01 LAB
APTT: 28.3 SEC (ref 26–36)
INR BLD: 1.14 (ref 0.86–1.14)
PROTHROMBIN TIME: 13 SEC (ref 9.8–13)

## 2018-11-01 PROCEDURE — 7100000001 HC PACU RECOVERY - ADDTL 15 MIN: Performed by: INTERNAL MEDICINE

## 2018-11-01 PROCEDURE — 2500000003 HC RX 250 WO HCPCS: Performed by: NURSE ANESTHETIST, CERTIFIED REGISTERED

## 2018-11-01 PROCEDURE — 85610 PROTHROMBIN TIME: CPT

## 2018-11-01 PROCEDURE — 7100000011 HC PHASE II RECOVERY - ADDTL 15 MIN: Performed by: INTERNAL MEDICINE

## 2018-11-01 PROCEDURE — 3700000001 HC ADD 15 MINUTES (ANESTHESIA): Performed by: INTERNAL MEDICINE

## 2018-11-01 PROCEDURE — 2500000003 HC RX 250 WO HCPCS: Performed by: ANESTHESIOLOGY

## 2018-11-01 PROCEDURE — 3609015300 HC ESOPHAGEAL DILATION MALONEY: Performed by: INTERNAL MEDICINE

## 2018-11-01 PROCEDURE — 3609012400 HC EGD TRANSORAL BIOPSY SINGLE/MULTIPLE: Performed by: INTERNAL MEDICINE

## 2018-11-01 PROCEDURE — 2580000003 HC RX 258: Performed by: ANESTHESIOLOGY

## 2018-11-01 PROCEDURE — 2500000003 HC RX 250 WO HCPCS

## 2018-11-01 PROCEDURE — 2709999900 HC NON-CHARGEABLE SUPPLY: Performed by: INTERNAL MEDICINE

## 2018-11-01 PROCEDURE — 7100000000 HC PACU RECOVERY - FIRST 15 MIN: Performed by: INTERNAL MEDICINE

## 2018-11-01 PROCEDURE — S0028 INJECTION, FAMOTIDINE, 20 MG: HCPCS | Performed by: ANESTHESIOLOGY

## 2018-11-01 PROCEDURE — 6360000002 HC RX W HCPCS: Performed by: NURSE ANESTHETIST, CERTIFIED REGISTERED

## 2018-11-01 PROCEDURE — 85730 THROMBOPLASTIN TIME PARTIAL: CPT

## 2018-11-01 PROCEDURE — 88305 TISSUE EXAM BY PATHOLOGIST: CPT

## 2018-11-01 PROCEDURE — 7100000010 HC PHASE II RECOVERY - FIRST 15 MIN: Performed by: INTERNAL MEDICINE

## 2018-11-01 PROCEDURE — 36415 COLL VENOUS BLD VENIPUNCTURE: CPT

## 2018-11-01 PROCEDURE — 3700000000 HC ANESTHESIA ATTENDED CARE: Performed by: INTERNAL MEDICINE

## 2018-11-01 PROCEDURE — 3609018500 HC EGD US SCOPE W/ADJACENT STRUCTURES: Performed by: INTERNAL MEDICINE

## 2018-11-01 RX ORDER — SODIUM CHLORIDE 9 MG/ML
INJECTION, SOLUTION INTRAVENOUS CONTINUOUS
Status: DISCONTINUED | OUTPATIENT
Start: 2018-11-01 | End: 2018-11-01 | Stop reason: HOSPADM

## 2018-11-01 RX ORDER — SODIUM CHLORIDE 0.9 % (FLUSH) 0.9 %
10 SYRINGE (ML) INJECTION EVERY 12 HOURS SCHEDULED
Status: DISCONTINUED | OUTPATIENT
Start: 2018-11-01 | End: 2018-11-01 | Stop reason: HOSPADM

## 2018-11-01 RX ORDER — PROMETHAZINE HYDROCHLORIDE 25 MG/ML
6.25 INJECTION, SOLUTION INTRAMUSCULAR; INTRAVENOUS
Status: DISCONTINUED | OUTPATIENT
Start: 2018-11-01 | End: 2018-11-01 | Stop reason: HOSPADM

## 2018-11-01 RX ORDER — METOPROLOL TARTRATE 5 MG/5ML
INJECTION INTRAVENOUS PRN
Status: DISCONTINUED | OUTPATIENT
Start: 2018-11-01 | End: 2018-11-01 | Stop reason: SDUPTHER

## 2018-11-01 RX ORDER — LIDOCAINE HYDROCHLORIDE 20 MG/ML
INJECTION, SOLUTION INFILTRATION; PERINEURAL PRN
Status: DISCONTINUED | OUTPATIENT
Start: 2018-11-01 | End: 2018-11-01 | Stop reason: SDUPTHER

## 2018-11-01 RX ORDER — PROPOFOL 10 MG/ML
INJECTION, EMULSION INTRAVENOUS PRN
Status: DISCONTINUED | OUTPATIENT
Start: 2018-11-01 | End: 2018-11-01 | Stop reason: SDUPTHER

## 2018-11-01 RX ORDER — ONDANSETRON 2 MG/ML
4 INJECTION INTRAMUSCULAR; INTRAVENOUS
Status: DISCONTINUED | OUTPATIENT
Start: 2018-11-01 | End: 2018-11-01 | Stop reason: HOSPADM

## 2018-11-01 RX ORDER — GLYCOPYRROLATE 0.2 MG/ML
INJECTION INTRAMUSCULAR; INTRAVENOUS PRN
Status: DISCONTINUED | OUTPATIENT
Start: 2018-11-01 | End: 2018-11-01 | Stop reason: SDUPTHER

## 2018-11-01 RX ORDER — LABETALOL HYDROCHLORIDE 5 MG/ML
5 INJECTION, SOLUTION INTRAVENOUS EVERY 10 MIN PRN
Status: DISCONTINUED | OUTPATIENT
Start: 2018-11-01 | End: 2018-11-01 | Stop reason: HOSPADM

## 2018-11-01 RX ORDER — SODIUM CHLORIDE 0.9 % (FLUSH) 0.9 %
10 SYRINGE (ML) INJECTION PRN
Status: DISCONTINUED | OUTPATIENT
Start: 2018-11-01 | End: 2018-11-01 | Stop reason: HOSPADM

## 2018-11-01 RX ORDER — PROPOFOL 10 MG/ML
INJECTION, EMULSION INTRAVENOUS CONTINUOUS PRN
Status: DISCONTINUED | OUTPATIENT
Start: 2018-11-01 | End: 2018-11-01 | Stop reason: SDUPTHER

## 2018-11-01 RX ADMIN — LIDOCAINE HYDROCHLORIDE 50 MG: 20 INJECTION, SOLUTION INFILTRATION; PERINEURAL at 15:23

## 2018-11-01 RX ADMIN — SODIUM CHLORIDE: 9 INJECTION, SOLUTION INTRAVENOUS at 14:42

## 2018-11-01 RX ADMIN — METOPROLOL TARTRATE 1 MG: 5 INJECTION, SOLUTION INTRAVENOUS at 15:27

## 2018-11-01 RX ADMIN — FAMOTIDINE 20 MG: 10 INJECTION, SOLUTION INTRAVENOUS at 14:46

## 2018-11-01 RX ADMIN — PROPOFOL 200 MCG/KG/MIN: 10 INJECTION, EMULSION INTRAVENOUS at 15:24

## 2018-11-01 RX ADMIN — METOPROLOL TARTRATE 1 MG: 5 INJECTION, SOLUTION INTRAVENOUS at 15:22

## 2018-11-01 RX ADMIN — GLYCOPYRROLATE 0.1 MG: 0.2 INJECTION, SOLUTION INTRAMUSCULAR; INTRAVENOUS at 15:17

## 2018-11-01 RX ADMIN — PROPOFOL 70 MG: 10 INJECTION, EMULSION INTRAVENOUS at 15:24

## 2018-11-01 ASSESSMENT — PULMONARY FUNCTION TESTS
PIF_VALUE: 0
PIF_VALUE: 1
PIF_VALUE: 0

## 2018-11-01 ASSESSMENT — ENCOUNTER SYMPTOMS: SHORTNESS OF BREATH: 1

## 2018-11-01 ASSESSMENT — PAIN - FUNCTIONAL ASSESSMENT: PAIN_FUNCTIONAL_ASSESSMENT: 0-10

## 2018-11-01 ASSESSMENT — COPD QUESTIONNAIRES: CAT_SEVERITY: MODERATE

## 2018-11-01 ASSESSMENT — PAIN SCALES - GENERAL: PAINLEVEL_OUTOF10: 0

## 2018-11-01 NOTE — ANESTHESIA PRE PROCEDURE
every Friday or as directed by [facility] Coumadin Service [phone] (Patient taking differently: Take 5mg by mouth daily EXCEPT 7.5mg every Monday and Friday or as directed by [facility] Coumadin [facility] [phone]) 100 tablet 1    carvedilol (COREG) 25 MG tablet Take 1 tablet by mouth 2 times daily (with meals) 180 tablet 3    albuterol sulfate HFA (PROAIR HFA) 108 (90 Base) MCG/ACT inhaler Inhale 2 puffs into the lungs every 6 hours as needed for Wheezing 1 Inhaler 3     No current facility-administered medications for this encounter. Vital Signs (Current) There were no vitals filed for this visit. Vital Signs Statistics (for past 48 hrs)     No Data Recorded    BP Readings from Last 3 Encounters:   09/28/18 124/80   08/24/18 126/66   07/05/18 120/60     BMI  Body mass index is 33.3 kg/m². Estimated body mass index is 33.3 kg/m² as calculated from the following:    Height as of this encounter: 5' 2.5\" (1.588 m). Weight as of this encounter: 185 lb (83.9 kg).     CBC   Lab Results   Component Value Date    WBC 7.8 06/11/2018    RBC 4.49 06/11/2018    HGB 13.7 06/11/2018    HCT 39.6 06/11/2018    MCV 88.2 06/11/2018    RDW 14.7 06/11/2018     06/11/2018     CMP    Lab Results   Component Value Date     06/11/2018    K 4.5 06/11/2018     06/11/2018    CO2 30 06/11/2018    BUN 15 06/11/2018    CREATININE 0.7 06/11/2018    GFRAA >60 06/11/2018    GFRAA >60 06/27/2012    AGRATIO 1.4 04/26/2018    LABGLOM >60 06/11/2018    GLUCOSE 170 06/11/2018    PROT 7.5 04/26/2018    PROT 7.4 03/22/2012    CALCIUM 9.6 06/11/2018    BILITOT 0.7 04/26/2018    ALKPHOS 66 04/26/2018    AST 17 04/26/2018    ALT 10 04/26/2018     BMP    Lab Results   Component Value Date     06/11/2018    K 4.5 06/11/2018     06/11/2018    CO2 30 06/11/2018    BUN 15 06/11/2018    CREATININE 0.7 06/11/2018    CALCIUM 9.6 06/11/2018    GFRAA >60 06/11/2018    GFRAA >60 06/27/2012    LABGLOM >60 06/11/2018    GLUCOSE 170 06/11/2018     POCGlucose  No results for input(s): GLUCOSE in the last 72 hours. Coags    Lab Results   Component Value Date    PROTIME 30.3 06/13/2018    PROTIME 19.5 11/10/2017    INR 2.6 10/12/2018    INR 2.66 06/13/2018    APTT 30.1 48/18/0814     HCG (If Applicable) No results found for: Monda Sago, HCG, HCGQUANT   ABGs   Lab Results   Component Value Date    PHART 7.442 02/20/2014    PO2ART 77.4 02/20/2014    KPN7CHP 36.4 02/20/2014    MFB1RSP 24.4 02/20/2014    BEART 1.1 02/20/2014    N4ATWJXW 95.6 02/20/2014      Type & Screen (If Applicable)  Lab Results   Component Value Date    LABABO A 06/03/2010    LABRH Positive 06/03/2010                            BMI: Wt Readings from Last 3 Encounters:       NPO Status:8 hours                          Anesthesia Evaluation  Patient summary reviewed no history of anesthetic complications:   Airway: Mallampati: III  TM distance: >3 FB   Neck ROM: full   Dental:    (+) partials      Pulmonary:normal exam    (+) COPD: moderate,  shortness of breath:      (-) pneumonia                           Cardiovascular:  Exercise tolerance: poor (<4 METS),   (+) hypertension:, CAD:, dysrhythmias: atrial fibrillation, CHF:, [place]:,         Rhythm: irregular  Rate: normal           Beta Blocker:  Dose within 24 Hrs         Neuro/Psych:   (+) neuromuscular disease:,             GI/Hepatic/Renal:   (+) GERD:, PUD,           Endo/Other:    (+) blood dyscrasia (warfarin 3 days ago): anticoagulation therapy:., .                 Abdominal:   (+) obese,         Vascular: negative vascular ROS. Anesthesia Plan      MAC     ASA 3       Induction: intravenous. Anesthetic plan and risks discussed with patient. Plan discussed with CRNA.                   This pre-anesthesia assessment may be used as a history and physical.    DOS STAFF ADDENDUM:    Pt seen and examined, chart reviewed (including anesthesia, drug and allergy

## 2018-11-01 NOTE — OP NOTE
duodenum. Endosonographic views were good, patient toleration was good. Findings:   Major Papilla: Endoscopically, the major papilla was normal.  Endosonographically, the major papilla appeared normal  Pancreas: There appeared to be pancreas divisum. The pancreatic duct measured 2.8mm off the major papilla and was difficult to follow through the cystic area in the head of the pancreas. The duct measured 3.6mm off the minor papilla and was easily followed to the body of the pancreas where it measured 1.9mm, and 1.8mm in the tail of the pancreas. The pancreatic parenchyma showed a 2.57 x 2.0 cm septated cyst which actually appeared to be a conglomerate of 3 separate cysts measuring 1.39 cm, 0.78 cm, and 1.2 cm in the head of the pancreas without intramural nodules. There was a 9.3 x 6mm and a 6.7 x 5.0mm cyst in the tail of the pancreas. Bile duct: The bile duct was dilated to 1.17 cm without shadowing stones or strictures. Gallbladder: The gallbladder was surgically absent. Liver: The liver appeared normal in the left lobe  Doppler evaluation of the superior mesenteric vein, portal vein, and splenic vein was normal.    The duodenum and stomach were decompressed and the scope was withdrawn from the patient. The patient tolerated the procedure well and was taken to the post anesthesia care unit in good condition. Doppler Interpretation:  Doppler evaluation was performed and interpreted on the spot by the endoscopist without the presence of a radiologist.      Specimens taken: none  Estimated blood loss: none      Impression:  1. Multiple pancreatic cysts as described above. Prior FNA showed them to be IPMN's. No cancerous change at present. There is an increase in number of cysts. All less than 3cm. No main duct involvement. Would monitor with serial imaging. Cannot tolerate MRI so would do CT pancreas protocol with contrast in 1 year.   2.  Possible pancreas divisum with a mildly dilated pancreatic duct. 3.  Post-cholecystectomy dilation of the bile duct, which is a common finding. 4.  Mild gastritis biopsied for H. Pylori. 5.  Otherwise normal EGD. A 54 Fr beavers dilator was passed without mucosal tearing. Recommendations:  1. Clear liquid diet advance as tolerated. 2.  Restart coumadin tonight. 3.  Patient to call for biopsy results in 1 week. 4.  CT abdomen with pancreas protocol in 1 year to follow the cysts.       Lee De Luna

## 2018-11-09 ENCOUNTER — ANTI-COAG VISIT (OUTPATIENT)
Dept: PHARMACY | Age: 83
End: 2018-11-09
Payer: MEDICARE

## 2018-11-09 LAB — INTERNATIONAL NORMALIZATION RATIO, POC: 1.6

## 2018-11-09 PROCEDURE — 85610 PROTHROMBIN TIME: CPT

## 2018-11-09 PROCEDURE — 99211 OFF/OP EST MAY X REQ PHY/QHP: CPT

## 2018-11-09 RX ORDER — PANTOPRAZOLE SODIUM 40 MG/1
40 TABLET, DELAYED RELEASE ORAL DAILY
COMMUNITY
End: 2019-06-27 | Stop reason: SDUPTHER

## 2018-11-16 ENCOUNTER — ANTI-COAG VISIT (OUTPATIENT)
Dept: PHARMACY | Age: 83
End: 2018-11-16
Payer: MEDICARE

## 2018-11-16 LAB — INTERNATIONAL NORMALIZATION RATIO, POC: 2.6

## 2018-11-16 PROCEDURE — 99211 OFF/OP EST MAY X REQ PHY/QHP: CPT

## 2018-11-16 PROCEDURE — 85610 PROTHROMBIN TIME: CPT

## 2018-11-16 NOTE — PROGRESS NOTES
Ms. Judie Parada is a 80 y.o.  female with history of A-Fib with RVR who presents today for anticoagulation monitoring and adjustment. Patient verifies current dosing regimen  Patient denies s/s bleeding/bruising/swelling/SOB  No blood in urine or stool. No dietary changes. No changes in medication/OTC agents/Herbals. No change in alcohol use. No missed doses. No Procedures scheduled in the future at this time. Lab Results   Component Value Date    INR 2.6 11/16/2018    INR 1.6 11/09/2018    INR 1.14 11/01/2018       Pertinent findings:   Patient states not doing very well. She states the antibiotics are tearing up her gut. She state she is going to stop taking them today. I convinced her to continue them since she had only a few more days. She states she will try to complete the therapy. Adjusted warfarin based on previous week and then resumed her warfarin once she completes her antibiotic at 5 mg daily except 7.5 mg on Monday and Friday. To be seen again in 2 weeks. Instructed her to call if she is instructed to continue the ABXs past 14 days or if MD changes her antibiotics. After visit summary printed and reviewed with patient.       Medications reviewed and updated on home medication list: NO     Warfarin dose updated on patient home medication list: NO

## 2018-11-23 ENCOUNTER — OFFICE VISIT (OUTPATIENT)
Dept: PRIMARY CARE CLINIC | Age: 83
End: 2018-11-23
Payer: MEDICARE

## 2018-11-23 VITALS
TEMPERATURE: 98.4 F | OXYGEN SATURATION: 96 % | BODY MASS INDEX: 33.31 KG/M2 | HEIGHT: 63 IN | HEART RATE: 76 BPM | SYSTOLIC BLOOD PRESSURE: 136 MMHG | DIASTOLIC BLOOD PRESSURE: 84 MMHG | WEIGHT: 188 LBS

## 2018-11-23 DIAGNOSIS — M54.16 LUMBAR RADICULOPATHY: ICD-10-CM

## 2018-11-23 DIAGNOSIS — G89.4 CHRONIC PAIN SYNDROME: ICD-10-CM

## 2018-11-23 DIAGNOSIS — J06.9 UPPER RESPIRATORY TRACT INFECTION, UNSPECIFIED TYPE: Primary | ICD-10-CM

## 2018-11-23 DIAGNOSIS — M79.7 FIBROMYALGIA: ICD-10-CM

## 2018-11-23 DIAGNOSIS — I10 ESSENTIAL HYPERTENSION: ICD-10-CM

## 2018-11-23 DIAGNOSIS — I48.20 ATRIAL FIBRILLATION, CHRONIC (HCC): ICD-10-CM

## 2018-11-23 DIAGNOSIS — I50.9 CONGESTIVE HEART FAILURE, UNSPECIFIED HF CHRONICITY, UNSPECIFIED HEART FAILURE TYPE (HCC): ICD-10-CM

## 2018-11-23 DIAGNOSIS — G62.9 NEUROPATHY: ICD-10-CM

## 2018-11-23 DIAGNOSIS — M16.0 PRIMARY OSTEOARTHRITIS OF BOTH HIPS: ICD-10-CM

## 2018-11-23 PROCEDURE — 1101F PT FALLS ASSESS-DOCD LE1/YR: CPT | Performed by: NURSE PRACTITIONER

## 2018-11-23 PROCEDURE — G8598 ASA/ANTIPLAT THER USED: HCPCS | Performed by: NURSE PRACTITIONER

## 2018-11-23 PROCEDURE — G8427 DOCREV CUR MEDS BY ELIG CLIN: HCPCS | Performed by: NURSE PRACTITIONER

## 2018-11-23 PROCEDURE — G8417 CALC BMI ABV UP PARAM F/U: HCPCS | Performed by: NURSE PRACTITIONER

## 2018-11-23 PROCEDURE — 1090F PRES/ABSN URINE INCON ASSESS: CPT | Performed by: NURSE PRACTITIONER

## 2018-11-23 PROCEDURE — 1036F TOBACCO NON-USER: CPT | Performed by: NURSE PRACTITIONER

## 2018-11-23 PROCEDURE — 4040F PNEUMOC VAC/ADMIN/RCVD: CPT | Performed by: NURSE PRACTITIONER

## 2018-11-23 PROCEDURE — G8484 FLU IMMUNIZE NO ADMIN: HCPCS | Performed by: NURSE PRACTITIONER

## 2018-11-23 PROCEDURE — 99214 OFFICE O/P EST MOD 30 MIN: CPT | Performed by: NURSE PRACTITIONER

## 2018-11-23 PROCEDURE — G8400 PT W/DXA NO RESULTS DOC: HCPCS | Performed by: NURSE PRACTITIONER

## 2018-11-23 PROCEDURE — 1123F ACP DISCUSS/DSCN MKR DOCD: CPT | Performed by: NURSE PRACTITIONER

## 2018-11-23 RX ORDER — CARVEDILOL 25 MG/1
12.5 TABLET ORAL 2 TIMES DAILY WITH MEALS
Qty: 180 TABLET | Refills: 3 | Status: SHIPPED
Start: 2018-11-23 | End: 2019-06-27 | Stop reason: SDUPTHER

## 2018-11-23 RX ORDER — AZITHROMYCIN 250 MG/1
250 TABLET, FILM COATED ORAL SEE ADMIN INSTRUCTIONS
Qty: 6 TABLET | Refills: 0 | Status: SHIPPED | OUTPATIENT
Start: 2018-11-23 | End: 2018-11-28

## 2018-11-23 RX ORDER — OXYCODONE AND ACETAMINOPHEN 7.5; 325 MG/1; MG/1
1 TABLET ORAL 2 TIMES DAILY
Qty: 60 TABLET | Refills: 0 | Status: SHIPPED | OUTPATIENT
Start: 2019-01-01 | End: 2019-01-31

## 2018-11-23 ASSESSMENT — ENCOUNTER SYMPTOMS
ALLERGIC/IMMUNOLOGIC NEGATIVE: 1
NAUSEA: 0
WHEEZING: 0
COLOR CHANGE: 0
PHOTOPHOBIA: 0
DIARRHEA: 0
BACK PAIN: 1
CHEST TIGHTNESS: 0
VOICE CHANGE: 0
CHOKING: 0
VOMITING: 0
CONSTIPATION: 1
ABDOMINAL PAIN: 0
TROUBLE SWALLOWING: 0
COUGH: 0
SHORTNESS OF BREATH: 0

## 2018-11-23 NOTE — PROGRESS NOTES
Subjective:      Patient ID: Orestes Rosa is a 80 y.o. female. 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 Her only complaint is of her gradually worsening pain, stating that as the weather gets colder, her pain gets worse. She denies chest pain. She has chronic exertional shortness of breath that is unchanged. She has no other complaints; however she reports that she was unable to complete the antibiotics given to her by Dr. Barbra Betancur. She also reports that she is unable to take two doses of coreg because it \"kills me\". Past Medical History:   Diagnosis Date    A fib     Arthritis     COPD (chronic obstructive pulmonary disease) ([facility])     Coronary artery disease     Femur fracture ([address] 01/2016    left    Fibromyalgia     GERD (gastroesophageal reflux disease)     History of shingles     Hypertension     PONV (postoperative nausea and vomiting)     Rotator cuff disorder     left     Ulcer of gastroesophageal junction 2006         Review of Systems   Constitutional: Negative for activity change, appetite change, chills, diaphoresis, fatigue and fever. HENT: Negative for congestion, trouble swallowing and voice change. Eyes: Negative for photophobia and visual disturbance. Respiratory: Negative for cough, choking, chest tightness, shortness of breath and wheezing. COPD   Cardiovascular: Negative for chest pain, palpitations and leg swelling. HTN, CAD, Paroxysmal Atrial fibrillation, CHF>>followed by cardiology at [id number] [facility]. INR's managed at coumadin clinic   Gastrointestinal: Positive for constipation (due to narcotics>>managed with prn miralax). Negative for abdominal pain, diarrhea, nausea and vomiting. Endocrine: Negative for polydipsia, polyphagia and polyuria.    Genitourinary: Negative for decreased urine volume, difficulty urinating, flank pain, frequency, hematuria and

## 2018-11-26 ENCOUNTER — TELEPHONE (OUTPATIENT)
Dept: PRIMARY CARE CLINIC | Age: 83
End: 2018-11-26

## 2018-11-26 DIAGNOSIS — M79.7 FIBROMYALGIA: ICD-10-CM

## 2018-11-26 DIAGNOSIS — M16.0 PRIMARY OSTEOARTHRITIS OF BOTH HIPS: ICD-10-CM

## 2018-11-26 DIAGNOSIS — G89.4 CHRONIC PAIN SYNDROME: ICD-10-CM

## 2018-11-26 DIAGNOSIS — G62.9 NEUROPATHY: ICD-10-CM

## 2018-11-26 DIAGNOSIS — M54.16 LUMBAR RADICULOPATHY: ICD-10-CM

## 2018-11-26 RX ORDER — OXYCODONE HCL 10 MG/1
10 TABLET, FILM COATED, EXTENDED RELEASE ORAL EVERY 12 HOURS
Qty: 60 TABLET | Refills: 0 | Status: SHIPPED | OUTPATIENT
Start: 2018-11-26 | End: 2018-12-24 | Stop reason: SDUPTHER

## 2018-12-04 ENCOUNTER — ANTI-COAG VISIT (OUTPATIENT)
Dept: PHARMACY | Age: 83
End: 2018-12-04
Payer: MEDICARE

## 2018-12-04 DIAGNOSIS — I48.91 ATRIAL FIBRILLATION WITH RAPID VENTRICULAR RESPONSE (HCC): ICD-10-CM

## 2018-12-04 LAB — INTERNATIONAL NORMALIZATION RATIO, POC: 1.4

## 2018-12-04 PROCEDURE — 85610 PROTHROMBIN TIME: CPT

## 2018-12-04 PROCEDURE — 99211 OFF/OP EST MAY X REQ PHY/QHP: CPT

## 2018-12-04 NOTE — PROGRESS NOTES
Ms. Felisha Post is a 80 y.o.  female with history of Afib who presents today for anticoagulation monitoring and adjustment. Patient verifies current dosing regimen. Patient denies s/s bleeding/bruising/swelling/SOB. No blood in urine or stool. No dietary changes. No changes in medication/OTC agents/Herbals. No change in alcohol use. No missed doses. No Procedures scheduled in the future at this time. Lab Results   Component Value Date    INR 1.4 12/04/2018    INR 2.6 11/16/2018    INR 1.6 11/09/2018       Patient appears well. No changes, no problems. Ms. Jose Roland states she is currently not on any antibiotics, she finished them a couple weeks ago. I told her to call if she receives any new medications. Patient was in a hurry to get out of here today. Coumadin Dose :   Take Warfarin 10mg (2 tablets) TODAY ONLY 12-4-18. Take Warfarin 7.5mg (1 & 1/2 tablets) on Wednesday 12-5-18. Then continue current dose : Warfarin 5 mg daily except 7.5 mg on Mondays and Fridays. Return in 2 & 1/2 weeks. Patient reminded to call the Anticoagulation Clinic with any medication changes. Patient given instructions utilizing the teach back method. After visit summary printed and reviewed with patient. Warfarin dose updated.

## 2018-12-21 ENCOUNTER — ANTI-COAG VISIT (OUTPATIENT)
Dept: PHARMACY | Age: 83
End: 2018-12-21
Payer: MEDICARE

## 2018-12-21 LAB — INTERNATIONAL NORMALIZATION RATIO, POC: 2.4

## 2018-12-21 PROCEDURE — 85610 PROTHROMBIN TIME: CPT

## 2018-12-21 PROCEDURE — 99211 OFF/OP EST MAY X REQ PHY/QHP: CPT

## 2018-12-21 NOTE — PROGRESS NOTES
Ms. Meek Turner is a 80 y.o.  female with history of A-Fib with RVR who presents today for anticoagulation monitoring and adjustment. Patient verifies current dosing regimen  Patient denies s/s bleeding/bruising/swelling/SOB  No blood in urine or stool. No dietary changes. No changes in medication/OTC agents/Herbals. No change in alcohol use. No missed doses. No Procedures scheduled in the future at this time. Lab Results   Component Value Date    INR 2.4 2018    INR 1.4 2018    INR 2.6 2018       Pertinent findings: Patient states having some dizziness. Not using her cane today. Instructed patient to use her cane to help balance herself especially if she is dizzy. No complaints regarding warfarin therapy. No change to warfarin weekly dosing. Next INR in 4 weeks. Warfarin dosin mg daily except 7.5 mg on Monday and Friday. After visit summary printed and reviewed with patient.       Medications reviewed and updated on home medication list:YES     Warfarin dose updated on patient home medication list: NO

## 2019-01-03 ENCOUNTER — OFFICE VISIT (OUTPATIENT)
Dept: PRIMARY CARE CLINIC | Age: 84
End: 2019-01-03
Payer: MEDICARE

## 2019-01-03 VITALS
WEIGHT: 190 LBS | HEIGHT: 63 IN | BODY MASS INDEX: 33.66 KG/M2 | RESPIRATION RATE: 20 BRPM | SYSTOLIC BLOOD PRESSURE: 126 MMHG | TEMPERATURE: 98.3 F | OXYGEN SATURATION: 97 % | DIASTOLIC BLOOD PRESSURE: 62 MMHG | HEART RATE: 79 BPM

## 2019-01-03 DIAGNOSIS — Z79.899 LONG TERM USE OF DRUG: ICD-10-CM

## 2019-01-03 DIAGNOSIS — R05.9 COUGH: ICD-10-CM

## 2019-01-03 DIAGNOSIS — M16.0 PRIMARY OSTEOARTHRITIS OF BOTH HIPS: ICD-10-CM

## 2019-01-03 DIAGNOSIS — G89.4 CHRONIC PAIN SYNDROME: Primary | ICD-10-CM

## 2019-01-03 DIAGNOSIS — M79.7 FIBROMYALGIA: ICD-10-CM

## 2019-01-03 DIAGNOSIS — I10 ESSENTIAL HYPERTENSION: ICD-10-CM

## 2019-01-03 DIAGNOSIS — G62.9 NEUROPATHY: ICD-10-CM

## 2019-01-03 DIAGNOSIS — M54.16 LUMBAR RADICULOPATHY: ICD-10-CM

## 2019-01-03 LAB
AMPHETAMINE SCREEN, URINE: NORMAL
BARBITURATE SCREEN, URINE: NORMAL
BENZODIAZEPINE SCREEN, URINE: NORMAL
BUPRENORPHINE URINE: NORMAL
COCAINE METABOLITE SCREEN URINE: NORMAL
GABAPENTIN SCREEN, URINE: NORMAL
MDMA URINE: NORMAL
METHADONE SCREEN, URINE: NORMAL
METHAMPHETAMINE, URINE: NORMAL
OPIATE SCREEN URINE: NORMAL
OXYCODONE SCREEN URINE: NORMAL
PHENCYCLIDINE SCREEN URINE: NORMAL
PROPOXYPHENE SCREEN, URINE: NORMAL
THC SCREEN, URINE: NORMAL
TRICYCLIC ANTIDEPRESSANTS, UR: NORMAL

## 2019-01-03 PROCEDURE — 4040F PNEUMOC VAC/ADMIN/RCVD: CPT | Performed by: NURSE PRACTITIONER

## 2019-01-03 PROCEDURE — G8400 PT W/DXA NO RESULTS DOC: HCPCS | Performed by: NURSE PRACTITIONER

## 2019-01-03 PROCEDURE — 99214 OFFICE O/P EST MOD 30 MIN: CPT | Performed by: NURSE PRACTITIONER

## 2019-01-03 PROCEDURE — G8427 DOCREV CUR MEDS BY ELIG CLIN: HCPCS | Performed by: NURSE PRACTITIONER

## 2019-01-03 PROCEDURE — 1036F TOBACCO NON-USER: CPT | Performed by: NURSE PRACTITIONER

## 2019-01-03 PROCEDURE — G8598 ASA/ANTIPLAT THER USED: HCPCS | Performed by: NURSE PRACTITIONER

## 2019-01-03 PROCEDURE — G8484 FLU IMMUNIZE NO ADMIN: HCPCS | Performed by: NURSE PRACTITIONER

## 2019-01-03 PROCEDURE — 80305 DRUG TEST PRSMV DIR OPT OBS: CPT | Performed by: NURSE PRACTITIONER

## 2019-01-03 PROCEDURE — 1123F ACP DISCUSS/DSCN MKR DOCD: CPT | Performed by: NURSE PRACTITIONER

## 2019-01-03 PROCEDURE — G8417 CALC BMI ABV UP PARAM F/U: HCPCS | Performed by: NURSE PRACTITIONER

## 2019-01-03 PROCEDURE — 1090F PRES/ABSN URINE INCON ASSESS: CPT | Performed by: NURSE PRACTITIONER

## 2019-01-03 PROCEDURE — 1101F PT FALLS ASSESS-DOCD LE1/YR: CPT | Performed by: NURSE PRACTITIONER

## 2019-01-03 RX ORDER — OXYCODONE HCL 10 MG/1
10 TABLET, FILM COATED, EXTENDED RELEASE ORAL EVERY 12 HOURS
Qty: 60 TABLET | Refills: 0 | Status: SHIPPED | OUTPATIENT
Start: 2019-01-27 | End: 2019-01-28 | Stop reason: SDUPTHER

## 2019-01-03 RX ORDER — OXYCODONE AND ACETAMINOPHEN 7.5; 325 MG/1; MG/1
1 TABLET ORAL 2 TIMES DAILY
Qty: 60 TABLET | Refills: 0 | Status: SHIPPED | OUTPATIENT
Start: 2019-04-05 | End: 2019-03-29 | Stop reason: SDUPTHER

## 2019-01-03 RX ORDER — OXYCODONE AND ACETAMINOPHEN 7.5; 325 MG/1; MG/1
1 TABLET ORAL 2 TIMES DAILY
Qty: 60 TABLET | Refills: 0 | Status: SHIPPED | OUTPATIENT
Start: 2019-03-05 | End: 2019-03-21 | Stop reason: SDUPTHER

## 2019-01-03 RX ORDER — OXYCODONE HCL 10 MG/1
10 TABLET, FILM COATED, EXTENDED RELEASE ORAL EVERY 12 HOURS
Qty: 60 TABLET | Refills: 0 | Status: SHIPPED | OUTPATIENT
Start: 2019-03-29 | End: 2019-03-29 | Stop reason: SDUPTHER

## 2019-01-03 RX ORDER — OXYCODONE HCL 10 MG/1
10 TABLET, FILM COATED, EXTENDED RELEASE ORAL EVERY 12 HOURS
Qty: 60 TABLET | Refills: 0 | Status: SHIPPED | OUTPATIENT
Start: 2019-02-27 | End: 2019-03-21 | Stop reason: SDUPTHER

## 2019-01-03 RX ORDER — OXYCODONE AND ACETAMINOPHEN 7.5; 325 MG/1; MG/1
1 TABLET ORAL EVERY 6 HOURS PRN
Qty: 60 TABLET | Refills: 0 | Status: SHIPPED | OUTPATIENT
Start: 2019-02-03 | End: 2019-03-21 | Stop reason: SDUPTHER

## 2019-01-03 ASSESSMENT — ENCOUNTER SYMPTOMS
COLOR CHANGE: 0
TROUBLE SWALLOWING: 0
VOMITING: 0
WHEEZING: 0
SHORTNESS OF BREATH: 0
VOICE CHANGE: 0
CONSTIPATION: 1
ABDOMINAL PAIN: 0
COUGH: 0
DIARRHEA: 0
PHOTOPHOBIA: 0
BACK PAIN: 1
CHEST TIGHTNESS: 0
CHOKING: 0
NAUSEA: 0

## 2019-01-04 DIAGNOSIS — J44.9 CHRONIC OBSTRUCTIVE PULMONARY DISEASE, UNSPECIFIED COPD TYPE (HCC): Primary | ICD-10-CM

## 2019-01-04 RX ORDER — ALBUTEROL SULFATE 90 UG/1
2 AEROSOL, METERED RESPIRATORY (INHALATION) EVERY 6 HOURS PRN
Qty: 1 INHALER | Refills: 3 | Status: SHIPPED | OUTPATIENT
Start: 2019-01-04 | End: 2019-06-27 | Stop reason: SDUPTHER

## 2019-01-28 ENCOUNTER — TELEPHONE (OUTPATIENT)
Dept: PRIMARY CARE CLINIC | Age: 84
End: 2019-01-28

## 2019-01-28 DIAGNOSIS — M79.7 FIBROMYALGIA: ICD-10-CM

## 2019-01-28 DIAGNOSIS — M16.0 PRIMARY OSTEOARTHRITIS OF BOTH HIPS: ICD-10-CM

## 2019-01-28 DIAGNOSIS — M54.16 LUMBAR RADICULOPATHY: ICD-10-CM

## 2019-01-28 DIAGNOSIS — G62.9 NEUROPATHY: ICD-10-CM

## 2019-01-28 DIAGNOSIS — G89.4 CHRONIC PAIN SYNDROME: ICD-10-CM

## 2019-01-28 RX ORDER — OXYCODONE HCL 10 MG/1
10 TABLET, FILM COATED, EXTENDED RELEASE ORAL EVERY 12 HOURS
Qty: 60 TABLET | Refills: 0 | Status: SHIPPED | OUTPATIENT
Start: 2019-01-28 | End: 2019-03-21 | Stop reason: SDUPTHER

## 2019-01-29 ENCOUNTER — TELEPHONE (OUTPATIENT)
Dept: PHARMACY | Age: 84
End: 2019-01-29

## 2019-02-12 ENCOUNTER — ANTI-COAG VISIT (OUTPATIENT)
Dept: PHARMACY | Age: 84
End: 2019-02-12
Payer: MEDICARE

## 2019-02-12 DIAGNOSIS — I48.91 ATRIAL FIBRILLATION WITH RAPID VENTRICULAR RESPONSE (HCC): ICD-10-CM

## 2019-02-12 LAB — INTERNATIONAL NORMALIZATION RATIO, POC: 1.4

## 2019-02-12 PROCEDURE — 85610 PROTHROMBIN TIME: CPT

## 2019-02-12 PROCEDURE — 99211 OFF/OP EST MAY X REQ PHY/QHP: CPT

## 2019-02-14 RX ORDER — WARFARIN SODIUM 5 MG/1
TABLET ORAL
Qty: 100 TABLET | Refills: 0 | Status: SHIPPED | OUTPATIENT
Start: 2019-02-14 | End: 2019-05-28 | Stop reason: SDUPTHER

## 2019-03-05 ENCOUNTER — HOSPITAL ENCOUNTER (EMERGENCY)
Age: 84
Discharge: HOME | End: 2019-03-05
Attending: EMERGENCY MEDICINE
Payer: MEDICARE

## 2019-03-05 VITALS
SYSTOLIC BLOOD PRESSURE: 144 MMHG | RESPIRATION RATE: 20 BRPM | DIASTOLIC BLOOD PRESSURE: 77 MMHG | HEIGHT: 62 IN | WEIGHT: 187.83 LBS | TEMPERATURE: 97.9 F | HEART RATE: 86 BPM | OXYGEN SATURATION: 97 % | BODY MASS INDEX: 34.57 KG/M2

## 2019-03-05 DIAGNOSIS — S81.812A LACERATION OF LEFT LOWER EXTREMITY, INITIAL ENCOUNTER: ICD-10-CM

## 2019-03-05 DIAGNOSIS — G89.29 CHRONIC ABDOMINAL PAIN: Primary | ICD-10-CM

## 2019-03-05 DIAGNOSIS — R10.9 CHRONIC ABDOMINAL PAIN: Primary | ICD-10-CM

## 2019-03-05 LAB
A/G RATIO: 1.2 (ref 1.1–2.2)
ALBUMIN SERPL-MCNC: 3.9 G/DL (ref 3.4–5)
ALP BLD-CCNC: 57 U/L (ref 40–129)
ALT SERPL-CCNC: 12 U/L (ref 10–40)
ANION GAP SERPL CALCULATED.3IONS-SCNC: 8 MMOL/L (ref 3–16)
AST SERPL-CCNC: 17 U/L (ref 15–37)
BASOPHILS ABSOLUTE: 0 K/UL (ref 0–0.2)
BASOPHILS RELATIVE PERCENT: 0.7 %
BILIRUB SERPL-MCNC: 0.5 MG/DL (ref 0–1)
BILIRUBIN URINE: NEGATIVE
BLOOD, URINE: ABNORMAL
BUN BLDV-MCNC: 16 MG/DL (ref 7–20)
CALCIUM SERPL-MCNC: 9.3 MG/DL (ref 8.3–10.6)
CHLORIDE BLD-SCNC: 102 MMOL/L (ref 99–110)
CLARITY: CLEAR
CO2: 29 MMOL/L (ref 21–32)
COLOR: YELLOW
CREAT SERPL-MCNC: 0.6 MG/DL (ref 0.6–1.2)
EOSINOPHILS ABSOLUTE: 0.2 K/UL (ref 0–0.6)
EOSINOPHILS RELATIVE PERCENT: 2.5 %
EPITHELIAL CELLS, UA: ABNORMAL /HPF
GFR AFRICAN AMERICAN: >60
GFR NON-AFRICAN AMERICAN: >60
GLOBULIN: 3.3 G/DL
GLUCOSE BLD-MCNC: 185 MG/DL (ref 70–99)
GLUCOSE URINE: NEGATIVE MG/DL
HCT VFR BLD CALC: 40.8 % (ref 36–48)
HEMOGLOBIN: 13.6 G/DL (ref 12–16)
INR BLD: 4.4 (ref 0.86–1.14)
INTERNATIONAL NORMALIZATION RATIO, POC: 4.4
KETONES, URINE: NEGATIVE MG/DL
LEUKOCYTE ESTERASE, URINE: NEGATIVE
LIPASE: 11 U/L (ref 13–60)
LYMPHOCYTES ABSOLUTE: 2.1 K/UL (ref 1–5.1)
LYMPHOCYTES RELATIVE PERCENT: 29.9 %
MCH RBC QN AUTO: 30 PG (ref 26–34)
MCHC RBC AUTO-ENTMCNC: 33.4 G/DL (ref 31–36)
MCV RBC AUTO: 89.8 FL (ref 80–100)
MICROSCOPIC EXAMINATION: YES
MONOCYTES ABSOLUTE: 0.5 K/UL (ref 0–1.3)
MONOCYTES RELATIVE PERCENT: 7.4 %
NEUTROPHILS ABSOLUTE: 4.3 K/UL (ref 1.7–7.7)
NEUTROPHILS RELATIVE PERCENT: 59.5 %
NITRITE, URINE: NEGATIVE
PDW BLD-RTO: 13.6 % (ref 12.4–15.4)
PH UA: 6 (ref 5–8)
PLATELET # BLD: 219 K/UL (ref 135–450)
PMV BLD AUTO: 7.3 FL (ref 5–10.5)
POTASSIUM SERPL-SCNC: 4.4 MMOL/L (ref 3.5–5.1)
PROTEIN UA: NEGATIVE MG/DL
PROTHROMBIN TIME: 50.2 SEC (ref 9.8–13)
RBC # BLD: 4.55 M/UL (ref 4–5.2)
RBC UA: ABNORMAL /HPF (ref 0–2)
SODIUM BLD-SCNC: 139 MMOL/L (ref 136–145)
SPECIFIC GRAVITY UA: 1.01 (ref 1–1.03)
TOTAL PROTEIN: 7.2 G/DL (ref 6.4–8.2)
URINE REFLEX TO CULTURE: ABNORMAL
URINE TYPE: ABNORMAL
UROBILINOGEN, URINE: 0.2 E.U./DL
WBC # BLD: 7.1 K/UL (ref 4–11)
WBC UA: ABNORMAL /HPF (ref 0–5)

## 2019-03-05 PROCEDURE — 83690 ASSAY OF LIPASE: CPT

## 2019-03-05 PROCEDURE — 93010 ELECTROCARDIOGRAM REPORT: CPT | Performed by: INTERNAL MEDICINE

## 2019-03-05 PROCEDURE — 4500000024 HC ED LEVEL 4 PROCEDURE

## 2019-03-05 PROCEDURE — 99284 EMERGENCY DEPT VISIT MOD MDM: CPT

## 2019-03-05 PROCEDURE — 85025 COMPLETE CBC W/AUTO DIFF WBC: CPT

## 2019-03-05 PROCEDURE — 80053 COMPREHEN METABOLIC PANEL: CPT

## 2019-03-05 PROCEDURE — 36415 COLL VENOUS BLD VENIPUNCTURE: CPT

## 2019-03-05 PROCEDURE — 93005 ELECTROCARDIOGRAM TRACING: CPT | Performed by: EMERGENCY MEDICINE

## 2019-03-05 PROCEDURE — 81001 URINALYSIS AUTO W/SCOPE: CPT

## 2019-03-05 PROCEDURE — 6370000000 HC RX 637 (ALT 250 FOR IP): Performed by: EMERGENCY MEDICINE

## 2019-03-05 PROCEDURE — 85610 PROTHROMBIN TIME: CPT

## 2019-03-05 RX ORDER — ONDANSETRON 4 MG/1
4 TABLET, FILM COATED ORAL EVERY 8 HOURS PRN
Qty: 10 TABLET | Refills: 0 | Status: SHIPPED | OUTPATIENT
Start: 2019-03-05 | End: 2019-03-15

## 2019-03-05 RX ORDER — ONDANSETRON 4 MG/1
4 TABLET, ORALLY DISINTEGRATING ORAL ONCE
Status: COMPLETED | OUTPATIENT
Start: 2019-03-05 | End: 2019-03-05

## 2019-03-05 RX ADMIN — ONDANSETRON 4 MG: 4 TABLET, ORALLY DISINTEGRATING ORAL at 17:28

## 2019-03-05 RX ADMIN — LIDOCAINE HYDROCHLORIDE: 20 SOLUTION ORAL; TOPICAL at 17:25

## 2019-03-05 ASSESSMENT — PAIN SCALES - GENERAL: PAINLEVEL_OUTOF10: 8

## 2019-03-05 ASSESSMENT — PAIN DESCRIPTION - PAIN TYPE: TYPE: ACUTE PAIN

## 2019-03-05 ASSESSMENT — PAIN DESCRIPTION - LOCATION: LOCATION: ABDOMEN

## 2019-03-05 ASSESSMENT — PAIN DESCRIPTION - DESCRIPTORS: DESCRIPTORS: BURNING

## 2019-03-06 ENCOUNTER — ANTI-COAG VISIT (OUTPATIENT)
Dept: PHARMACY | Age: 84
End: 2019-03-06

## 2019-03-06 LAB
EKG ATRIAL RATE: 93 BPM
EKG DIAGNOSIS: NORMAL
EKG Q-T INTERVAL: 362 MS
EKG QRS DURATION: 90 MS
EKG QTC CALCULATION (BAZETT): 438 MS
EKG R AXIS: 56 DEGREES
EKG T AXIS: 33 DEGREES
EKG VENTRICULAR RATE: 88 BPM

## 2019-03-12 ENCOUNTER — ANTI-COAG VISIT (OUTPATIENT)
Dept: PHARMACY | Age: 84
End: 2019-03-12
Payer: MEDICARE

## 2019-03-12 DIAGNOSIS — I48.91 ATRIAL FIBRILLATION WITH RAPID VENTRICULAR RESPONSE (HCC): ICD-10-CM

## 2019-03-12 LAB — INTERNATIONAL NORMALIZATION RATIO, POC: 1.6

## 2019-03-12 PROCEDURE — 99211 OFF/OP EST MAY X REQ PHY/QHP: CPT

## 2019-03-12 PROCEDURE — 85610 PROTHROMBIN TIME: CPT

## 2019-03-14 LAB — H PYLORI ANTIGEN STOOL: NEGATIVE

## 2019-03-22 ENCOUNTER — ANTI-COAG VISIT (OUTPATIENT)
Dept: PHARMACY | Age: 84
End: 2019-03-22
Payer: MEDICARE

## 2019-03-22 LAB — INTERNATIONAL NORMALIZATION RATIO, POC: 2.8

## 2019-03-22 PROCEDURE — 99211 OFF/OP EST MAY X REQ PHY/QHP: CPT

## 2019-03-22 PROCEDURE — 85610 PROTHROMBIN TIME: CPT

## 2019-03-29 ENCOUNTER — TELEPHONE (OUTPATIENT)
Dept: PRIMARY CARE CLINIC | Age: 84
End: 2019-03-29

## 2019-03-29 ENCOUNTER — TELEPHONE (OUTPATIENT)
Dept: CARDIOLOGY CLINIC | Age: 84
End: 2019-03-29

## 2019-03-29 ENCOUNTER — OFFICE VISIT (OUTPATIENT)
Dept: PRIMARY CARE CLINIC | Age: 84
End: 2019-03-29
Payer: MEDICARE

## 2019-03-29 VITALS
HEART RATE: 84 BPM | BODY MASS INDEX: 35.33 KG/M2 | HEIGHT: 62 IN | OXYGEN SATURATION: 97 % | TEMPERATURE: 98.4 F | DIASTOLIC BLOOD PRESSURE: 76 MMHG | WEIGHT: 192 LBS | SYSTOLIC BLOOD PRESSURE: 128 MMHG

## 2019-03-29 DIAGNOSIS — G62.9 NEUROPATHY: ICD-10-CM

## 2019-03-29 DIAGNOSIS — M79.7 FIBROMYALGIA: ICD-10-CM

## 2019-03-29 DIAGNOSIS — I48.20 ATRIAL FIBRILLATION, CHRONIC (HCC): ICD-10-CM

## 2019-03-29 DIAGNOSIS — Z51.81 MEDICATION MONITORING ENCOUNTER: ICD-10-CM

## 2019-03-29 DIAGNOSIS — Z23 NEED FOR SHINGLES VACCINE: ICD-10-CM

## 2019-03-29 DIAGNOSIS — J44.9 CHRONIC OBSTRUCTIVE PULMONARY DISEASE, UNSPECIFIED COPD TYPE (HCC): ICD-10-CM

## 2019-03-29 DIAGNOSIS — K21.9 GASTROESOPHAGEAL REFLUX DISEASE WITHOUT ESOPHAGITIS: ICD-10-CM

## 2019-03-29 DIAGNOSIS — M16.0 PRIMARY OSTEOARTHRITIS OF BOTH HIPS: ICD-10-CM

## 2019-03-29 DIAGNOSIS — I50.9 CONGESTIVE HEART FAILURE, UNSPECIFIED HF CHRONICITY, UNSPECIFIED HEART FAILURE TYPE (HCC): ICD-10-CM

## 2019-03-29 DIAGNOSIS — M54.16 LUMBAR RADICULOPATHY: ICD-10-CM

## 2019-03-29 DIAGNOSIS — R73.09 ELEVATED GLUCOSE: ICD-10-CM

## 2019-03-29 DIAGNOSIS — I10 ESSENTIAL HYPERTENSION: ICD-10-CM

## 2019-03-29 DIAGNOSIS — G89.4 CHRONIC PAIN SYNDROME: Primary | ICD-10-CM

## 2019-03-29 LAB — HBA1C MFR BLD: 6.5 %

## 2019-03-29 PROCEDURE — 83036 HEMOGLOBIN GLYCOSYLATED A1C: CPT | Performed by: NURSE PRACTITIONER

## 2019-03-29 PROCEDURE — G8400 PT W/DXA NO RESULTS DOC: HCPCS | Performed by: NURSE PRACTITIONER

## 2019-03-29 PROCEDURE — G8484 FLU IMMUNIZE NO ADMIN: HCPCS | Performed by: NURSE PRACTITIONER

## 2019-03-29 PROCEDURE — G8427 DOCREV CUR MEDS BY ELIG CLIN: HCPCS | Performed by: NURSE PRACTITIONER

## 2019-03-29 PROCEDURE — 4040F PNEUMOC VAC/ADMIN/RCVD: CPT | Performed by: NURSE PRACTITIONER

## 2019-03-29 PROCEDURE — 1036F TOBACCO NON-USER: CPT | Performed by: NURSE PRACTITIONER

## 2019-03-29 PROCEDURE — G8417 CALC BMI ABV UP PARAM F/U: HCPCS | Performed by: NURSE PRACTITIONER

## 2019-03-29 PROCEDURE — G8598 ASA/ANTIPLAT THER USED: HCPCS | Performed by: NURSE PRACTITIONER

## 2019-03-29 PROCEDURE — 1090F PRES/ABSN URINE INCON ASSESS: CPT | Performed by: NURSE PRACTITIONER

## 2019-03-29 PROCEDURE — G8926 SPIRO NO PERF OR DOC: HCPCS | Performed by: NURSE PRACTITIONER

## 2019-03-29 PROCEDURE — 99214 OFFICE O/P EST MOD 30 MIN: CPT | Performed by: NURSE PRACTITIONER

## 2019-03-29 PROCEDURE — 1123F ACP DISCUSS/DSCN MKR DOCD: CPT | Performed by: NURSE PRACTITIONER

## 2019-03-29 PROCEDURE — 3023F SPIROM DOC REV: CPT | Performed by: NURSE PRACTITIONER

## 2019-03-29 RX ORDER — NALOXONE HYDROCHLORIDE 4 MG/.1ML
1 SPRAY NASAL PRN
Qty: 1 EACH | Refills: 5 | Status: SHIPPED | OUTPATIENT
Start: 2019-03-29

## 2019-03-29 RX ORDER — OXYCODONE HCL 10 MG/1
10 TABLET, FILM COATED, EXTENDED RELEASE ORAL EVERY 12 HOURS
Qty: 60 TABLET | Refills: 0 | Status: SHIPPED | OUTPATIENT
Start: 2019-03-30 | End: 2019-04-29

## 2019-03-29 RX ORDER — OXYCODONE AND ACETAMINOPHEN 7.5; 325 MG/1; MG/1
1 TABLET ORAL 2 TIMES DAILY
Qty: 60 TABLET | Refills: 0 | Status: SHIPPED | OUTPATIENT
Start: 2019-05-04 | End: 2019-06-03

## 2019-03-29 RX ORDER — OXYCODONE AND ACETAMINOPHEN 7.5; 325 MG/1; MG/1
1 TABLET ORAL 2 TIMES DAILY
Qty: 60 TABLET | Refills: 0 | Status: SHIPPED | OUTPATIENT
Start: 2019-06-03 | End: 2019-06-27 | Stop reason: SDUPTHER

## 2019-03-29 RX ORDER — OXYCODONE HCL 10 MG/1
10 TABLET, FILM COATED, EXTENDED RELEASE ORAL EVERY 12 HOURS
Qty: 60 TABLET | Refills: 0 | Status: SHIPPED | OUTPATIENT
Start: 2019-04-29 | End: 2019-05-29

## 2019-03-29 RX ORDER — OXYCODONE AND ACETAMINOPHEN 7.5; 325 MG/1; MG/1
1 TABLET ORAL EVERY 6 HOURS PRN
Qty: 60 TABLET | Refills: 0 | Status: SHIPPED | OUTPATIENT
Start: 2019-04-04 | End: 2019-05-04

## 2019-03-29 RX ORDER — OXYCODONE HCL 10 MG/1
10 TABLET, FILM COATED, EXTENDED RELEASE ORAL EVERY 12 HOURS
Qty: 60 TABLET | Refills: 0 | Status: SHIPPED | OUTPATIENT
Start: 2019-05-29 | End: 2019-06-27 | Stop reason: SDUPTHER

## 2019-03-29 ASSESSMENT — ENCOUNTER SYMPTOMS
TROUBLE SWALLOWING: 0
PHOTOPHOBIA: 0
ABDOMINAL PAIN: 0
NAUSEA: 0
COUGH: 0
BACK PAIN: 1
WHEEZING: 0
CONSTIPATION: 1
DIARRHEA: 0
VOICE CHANGE: 0
VOMITING: 0
CHOKING: 0
COLOR CHANGE: 0
SHORTNESS OF BREATH: 0
CHEST TIGHTNESS: 0

## 2019-03-29 ASSESSMENT — PATIENT HEALTH QUESTIONNAIRE - PHQ9
SUM OF ALL RESPONSES TO PHQ QUESTIONS 1-9: 0
2. FEELING DOWN, DEPRESSED OR HOPELESS: 0
1. LITTLE INTEREST OR PLEASURE IN DOING THINGS: 0
SUM OF ALL RESPONSES TO PHQ QUESTIONS 1-9: 0
SUM OF ALL RESPONSES TO PHQ9 QUESTIONS 1 & 2: 0

## 2019-04-02 LAB
6-ACETYLMORPHINE: NOT DETECTED
7-AMINOCLONAZEPAM: NOT DETECTED
ALPHA-OH-ALPRAZOLAM: NOT DETECTED
ALPRAZOLAM: NOT DETECTED
AMPHETAMINE: NOT DETECTED
BARBITURATES: NOT DETECTED
BENZOYLECGONINE: NOT DETECTED
BUPRENORPHINE: NOT DETECTED
CARISOPRODOL: NOT DETECTED
CLONAZEPAM: NOT DETECTED
CODEINE: NOT DETECTED
CREATININE URINE: 51.3 MG/DL (ref 20–400)
DIAZEPAM: NOT DETECTED
DRUGS EXPECTED: NORMAL
EER PAIN MGT DRUG PANEL, HIGH RES/EMIT U: NORMAL
ETHYL GLUCURONIDE: NOT DETECTED
FENTANYL: NOT DETECTED
HYDROCODONE: NOT DETECTED
HYDROMORPHONE: NOT DETECTED
LORAZEPAM: NOT DETECTED
MARIJUANA METABOLITE: NOT DETECTED
MDA: NOT DETECTED
MDEA: NOT DETECTED
MDMA URINE: NOT DETECTED
MEPERIDINE: NOT DETECTED
METHADONE: NOT DETECTED
METHAMPHETAMINE: NOT DETECTED
METHYLPHENIDATE: NOT DETECTED
MIDAZOLAM: NOT DETECTED
MORPHINE: NOT DETECTED
NORBUPRENORPHINE, FREE: NOT DETECTED
NORDIAZEPAM: NOT DETECTED
NORFENTANYL: NOT DETECTED
NORHYDROCODONE, URINE: NOT DETECTED
NOROXYCODONE: PRESENT
NOROXYMORPHONE, URINE: PRESENT
OXAZEPAM: NOT DETECTED
OXYCODONE: PRESENT
OXYMORPHONE: NOT DETECTED
PAIN MANAGEMENT DRUG PANEL: NORMAL
PAIN MANAGEMENT DRUG PANEL: NORMAL
PCP: NOT DETECTED
PHENTERMINE: NOT DETECTED
PROPOXYPHENE: NOT DETECTED
TAPENTADOL, URINE: NOT DETECTED
TAPENTADOL-O-SULFATE, URINE: NOT DETECTED
TEMAZEPAM: NOT DETECTED
TRAMADOL: NOT DETECTED
ZOLPIDEM: NOT DETECTED

## 2019-04-12 ENCOUNTER — ANTI-COAG VISIT (OUTPATIENT)
Dept: PHARMACY | Age: 84
End: 2019-04-12
Payer: MEDICARE

## 2019-04-12 LAB — INTERNATIONAL NORMALIZATION RATIO, POC: 2

## 2019-04-12 PROCEDURE — 85610 PROTHROMBIN TIME: CPT

## 2019-04-12 PROCEDURE — 99211 OFF/OP EST MAY X REQ PHY/QHP: CPT

## 2019-05-17 ENCOUNTER — ANTI-COAG VISIT (OUTPATIENT)
Dept: PHARMACY | Age: 84
End: 2019-05-17
Payer: MEDICARE

## 2019-05-17 DIAGNOSIS — I48.91 ATRIAL FIBRILLATION WITH RAPID VENTRICULAR RESPONSE (HCC): ICD-10-CM

## 2019-05-17 LAB — INTERNATIONAL NORMALIZATION RATIO, POC: 1.3

## 2019-05-17 PROCEDURE — 99211 OFF/OP EST MAY X REQ PHY/QHP: CPT

## 2019-05-17 PROCEDURE — 85610 PROTHROMBIN TIME: CPT

## 2019-05-28 ENCOUNTER — ANTI-COAG VISIT (OUTPATIENT)
Dept: PHARMACY | Age: 84
End: 2019-05-28
Payer: MEDICARE

## 2019-05-28 DIAGNOSIS — I48.91 ATRIAL FIBRILLATION WITH RAPID VENTRICULAR RESPONSE (HCC): ICD-10-CM

## 2019-05-28 LAB — INTERNATIONAL NORMALIZATION RATIO, POC: 3

## 2019-05-28 PROCEDURE — 85610 PROTHROMBIN TIME: CPT

## 2019-05-28 PROCEDURE — 99211 OFF/OP EST MAY X REQ PHY/QHP: CPT

## 2019-05-28 RX ORDER — WARFARIN SODIUM 5 MG/1
TABLET ORAL
Qty: 120 TABLET | Refills: 3 | Status: SHIPPED | OUTPATIENT
Start: 2019-05-28 | End: 2019-09-19 | Stop reason: SDUPTHER

## 2019-06-27 ENCOUNTER — OFFICE VISIT (OUTPATIENT)
Dept: PRIMARY CARE CLINIC | Age: 84
End: 2019-06-27
Payer: MEDICARE

## 2019-06-27 VITALS
TEMPERATURE: 98.4 F | OXYGEN SATURATION: 96 % | SYSTOLIC BLOOD PRESSURE: 128 MMHG | HEIGHT: 63 IN | DIASTOLIC BLOOD PRESSURE: 80 MMHG | HEART RATE: 90 BPM | WEIGHT: 190 LBS | BODY MASS INDEX: 33.66 KG/M2

## 2019-06-27 DIAGNOSIS — G62.9 NEUROPATHY: ICD-10-CM

## 2019-06-27 DIAGNOSIS — J44.9 CHRONIC OBSTRUCTIVE PULMONARY DISEASE, UNSPECIFIED COPD TYPE (HCC): ICD-10-CM

## 2019-06-27 DIAGNOSIS — R06.02 SOB (SHORTNESS OF BREATH) ON EXERTION: ICD-10-CM

## 2019-06-27 DIAGNOSIS — R35.0 URINARY FREQUENCY: ICD-10-CM

## 2019-06-27 DIAGNOSIS — I10 ESSENTIAL HYPERTENSION: ICD-10-CM

## 2019-06-27 DIAGNOSIS — E78.1 PURE HYPERGLYCERIDEMIA: ICD-10-CM

## 2019-06-27 DIAGNOSIS — Z79.899 LONG TERM USE OF DRUG: ICD-10-CM

## 2019-06-27 DIAGNOSIS — M16.0 PRIMARY OSTEOARTHRITIS OF BOTH HIPS: ICD-10-CM

## 2019-06-27 DIAGNOSIS — M54.16 LUMBAR RADICULOPATHY: Primary | ICD-10-CM

## 2019-06-27 DIAGNOSIS — G89.4 CHRONIC PAIN SYNDROME: ICD-10-CM

## 2019-06-27 DIAGNOSIS — M79.7 FIBROMYALGIA: ICD-10-CM

## 2019-06-27 DIAGNOSIS — R60.0 PEDAL EDEMA: ICD-10-CM

## 2019-06-27 LAB
A/G RATIO: 1.7 (ref 1.1–2.2)
ALBUMIN SERPL-MCNC: 5 G/DL (ref 3.4–5)
ALP BLD-CCNC: 59 U/L (ref 40–129)
ALT SERPL-CCNC: 12 U/L (ref 10–40)
ANION GAP SERPL CALCULATED.3IONS-SCNC: 14 MMOL/L (ref 3–16)
AST SERPL-CCNC: 18 U/L (ref 15–37)
BASOPHILS ABSOLUTE: 0.1 K/UL (ref 0–0.2)
BASOPHILS RELATIVE PERCENT: 0.7 %
BILIRUB SERPL-MCNC: 0.5 MG/DL (ref 0–1)
BILIRUBIN, POC: NEGATIVE
BLOOD URINE, POC: NEGATIVE
BUN BLDV-MCNC: 16 MG/DL (ref 7–20)
CALCIUM SERPL-MCNC: 10.3 MG/DL (ref 8.3–10.6)
CHLORIDE BLD-SCNC: 98 MMOL/L (ref 99–110)
CHOLESTEROL, TOTAL: 153 MG/DL (ref 0–199)
CLARITY, POC: NORMAL
CO2: 28 MMOL/L (ref 21–32)
COLOR, POC: NORMAL
CREAT SERPL-MCNC: 0.7 MG/DL (ref 0.6–1.2)
EOSINOPHILS ABSOLUTE: 0.2 K/UL (ref 0–0.6)
EOSINOPHILS RELATIVE PERCENT: 2.7 %
GFR AFRICAN AMERICAN: >60
GFR NON-AFRICAN AMERICAN: >60
GLOBULIN: 3 G/DL
GLUCOSE BLD-MCNC: 122 MG/DL (ref 70–99)
GLUCOSE URINE, POC: NEGATIVE
HCT VFR BLD CALC: 42.3 % (ref 36–48)
HDLC SERPL-MCNC: 54 MG/DL (ref 40–60)
HEMOGLOBIN: 14 G/DL (ref 12–16)
KETONES, POC: NEGATIVE
LDL CHOLESTEROL CALCULATED: 73 MG/DL
LEUKOCYTE EST, POC: NEGATIVE
LYMPHOCYTES ABSOLUTE: 2.2 K/UL (ref 1–5.1)
LYMPHOCYTES RELATIVE PERCENT: 27.3 %
MCH RBC QN AUTO: 30.2 PG (ref 26–34)
MCHC RBC AUTO-ENTMCNC: 33.2 G/DL (ref 31–36)
MCV RBC AUTO: 90.9 FL (ref 80–100)
MONOCYTES ABSOLUTE: 0.7 K/UL (ref 0–1.3)
MONOCYTES RELATIVE PERCENT: 7.9 %
NEUTROPHILS ABSOLUTE: 5.1 K/UL (ref 1.7–7.7)
NEUTROPHILS RELATIVE PERCENT: 61.4 %
NITRITE, POC: NEGATIVE
PDW BLD-RTO: 14.9 % (ref 12.4–15.4)
PH, POC: 7
PLATELET # BLD: 201 K/UL (ref 135–450)
PMV BLD AUTO: 8.5 FL (ref 5–10.5)
POTASSIUM SERPL-SCNC: 4.9 MMOL/L (ref 3.5–5.1)
PRO-BNP: 465 PG/ML (ref 0–449)
PROTEIN, POC: NEGATIVE
RBC # BLD: 4.65 M/UL (ref 4–5.2)
SODIUM BLD-SCNC: 140 MMOL/L (ref 136–145)
SPECIFIC GRAVITY, POC: 1.02
TOTAL PROTEIN: 8 G/DL (ref 6.4–8.2)
TRIGL SERPL-MCNC: 129 MG/DL (ref 0–150)
UROBILINOGEN, POC: 1
VLDLC SERPL CALC-MCNC: 26 MG/DL
WBC # BLD: 8.2 K/UL (ref 4–11)

## 2019-06-27 PROCEDURE — 1090F PRES/ABSN URINE INCON ASSESS: CPT | Performed by: NURSE PRACTITIONER

## 2019-06-27 PROCEDURE — 81002 URINALYSIS NONAUTO W/O SCOPE: CPT | Performed by: NURSE PRACTITIONER

## 2019-06-27 PROCEDURE — G8598 ASA/ANTIPLAT THER USED: HCPCS | Performed by: NURSE PRACTITIONER

## 2019-06-27 PROCEDURE — 1123F ACP DISCUSS/DSCN MKR DOCD: CPT | Performed by: NURSE PRACTITIONER

## 2019-06-27 PROCEDURE — G8400 PT W/DXA NO RESULTS DOC: HCPCS | Performed by: NURSE PRACTITIONER

## 2019-06-27 PROCEDURE — 99214 OFFICE O/P EST MOD 30 MIN: CPT | Performed by: NURSE PRACTITIONER

## 2019-06-27 PROCEDURE — G8417 CALC BMI ABV UP PARAM F/U: HCPCS | Performed by: NURSE PRACTITIONER

## 2019-06-27 PROCEDURE — G8926 SPIRO NO PERF OR DOC: HCPCS | Performed by: NURSE PRACTITIONER

## 2019-06-27 PROCEDURE — 36415 COLL VENOUS BLD VENIPUNCTURE: CPT | Performed by: NURSE PRACTITIONER

## 2019-06-27 PROCEDURE — 4040F PNEUMOC VAC/ADMIN/RCVD: CPT | Performed by: NURSE PRACTITIONER

## 2019-06-27 PROCEDURE — G8427 DOCREV CUR MEDS BY ELIG CLIN: HCPCS | Performed by: NURSE PRACTITIONER

## 2019-06-27 PROCEDURE — 3023F SPIROM DOC REV: CPT | Performed by: NURSE PRACTITIONER

## 2019-06-27 PROCEDURE — 1036F TOBACCO NON-USER: CPT | Performed by: NURSE PRACTITIONER

## 2019-06-27 RX ORDER — AMLODIPINE BESYLATE 10 MG/1
5 TABLET ORAL DAILY
Qty: 45 TABLET | Refills: 3 | Status: SHIPPED | OUTPATIENT
Start: 2019-06-27 | End: 2019-08-13 | Stop reason: SDUPTHER

## 2019-06-27 RX ORDER — POTASSIUM CHLORIDE 20 MEQ/1
20 TABLET, EXTENDED RELEASE ORAL 2 TIMES DAILY PRN
Qty: 60 TABLET | Refills: 0 | Status: SHIPPED | OUTPATIENT
Start: 2019-06-27 | End: 2019-09-19

## 2019-06-27 RX ORDER — POLYETHYLENE GLYCOL 3350 17 G/17G
17 POWDER, FOR SOLUTION ORAL DAILY
Qty: 1530 G | Refills: 1 | Status: SHIPPED | OUTPATIENT
Start: 2019-06-27 | End: 2019-07-27

## 2019-06-27 RX ORDER — ALBUTEROL SULFATE 90 UG/1
2 AEROSOL, METERED RESPIRATORY (INHALATION) EVERY 6 HOURS PRN
Qty: 1 INHALER | Refills: 3 | Status: SHIPPED | OUTPATIENT
Start: 2019-06-27 | End: 2019-09-19 | Stop reason: SDUPTHER

## 2019-06-27 RX ORDER — CARVEDILOL 25 MG/1
12.5 TABLET ORAL 2 TIMES DAILY WITH MEALS
Qty: 180 TABLET | Refills: 3 | Status: SHIPPED | OUTPATIENT
Start: 2019-06-27 | End: 2019-09-19 | Stop reason: SDUPTHER

## 2019-06-27 RX ORDER — OXYCODONE AND ACETAMINOPHEN 7.5; 325 MG/1; MG/1
1 TABLET ORAL 2 TIMES DAILY
Qty: 60 TABLET | Refills: 0 | Status: SHIPPED | OUTPATIENT
Start: 2019-08-02 | End: 2019-09-19 | Stop reason: SDUPTHER

## 2019-06-27 RX ORDER — LISINOPRIL 20 MG/1
20 TABLET ORAL DAILY
Qty: 90 TABLET | Refills: 3 | Status: SHIPPED | OUTPATIENT
Start: 2019-06-27 | End: 2019-09-19 | Stop reason: SDUPTHER

## 2019-06-27 RX ORDER — FUROSEMIDE 40 MG/1
40 TABLET ORAL DAILY PRN
Qty: 30 TABLET | Refills: 0 | Status: SHIPPED | OUTPATIENT
Start: 2019-06-27 | End: 2019-09-19

## 2019-06-27 RX ORDER — PANTOPRAZOLE SODIUM 40 MG/1
40 TABLET, DELAYED RELEASE ORAL DAILY
Qty: 30 TABLET | Refills: 2 | Status: SHIPPED | OUTPATIENT
Start: 2019-06-27 | End: 2019-09-19

## 2019-06-27 RX ORDER — OXYCODONE HCL 10 MG/1
10 TABLET, FILM COATED, EXTENDED RELEASE ORAL EVERY 12 HOURS
Qty: 60 TABLET | Refills: 0 | Status: SHIPPED | OUTPATIENT
Start: 2019-07-03 | End: 2019-07-03

## 2019-06-27 RX ORDER — OXYCODONE AND ACETAMINOPHEN 7.5; 325 MG/1; MG/1
1 TABLET ORAL 2 TIMES DAILY
Qty: 60 TABLET | Refills: 0 | Status: SHIPPED | OUTPATIENT
Start: 2019-09-01 | End: 2019-09-19 | Stop reason: SDUPTHER

## 2019-06-27 ASSESSMENT — ENCOUNTER SYMPTOMS
BACK PAIN: 1
PHOTOPHOBIA: 0
BLOOD IN STOOL: 0
CHEST TIGHTNESS: 0
NAUSEA: 0
COUGH: 0
CHOKING: 0
ABDOMINAL PAIN: 0
WHEEZING: 0
SHORTNESS OF BREATH: 0
TROUBLE SWALLOWING: 0
VOICE CHANGE: 0
CONSTIPATION: 1
DIARRHEA: 0
COLOR CHANGE: 0
VOMITING: 0

## 2019-06-27 NOTE — PROGRESS NOTES
Subjective:      Patient ID: Jamie Carrasquillo is a 80 y.o. female. 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  Her continues to complain of gradually worsening pain, stating that as the weather gets colder, her pain gets worse. She continues to decline referral for pain management. She is swelling today and reports that she has been for about one week. She has chronic exertional shortness of breath that is unchanged. She denies chest pain. She was treated by Dr. Shandra Higginbotham for abdominal pain with protonix. Her symptoms have improved. She complains of urine frequency. No hematuria or dysuria. No other complaints. Past Medical History:   Diagnosis Date    A fib     Arthritis     COPD (chronic obstructive pulmonary disease) (HCC)     Coronary artery disease     Femur fracture ([address] 01/2016    left    Fibromyalgia     GERD (gastroesophageal reflux disease)     History of shingles     Hypertension     PONV (postoperative nausea and vomiting)     Rotator cuff disorder     left     Ulcer of gastroesophageal junction 2006         Review of Systems   Constitutional: Negative for activity change, appetite change, chills, diaphoresis, fatigue, fever and unexpected weight change. HENT: Negative for congestion, trouble swallowing and voice change. Eyes: Negative for photophobia and visual disturbance. Respiratory: Negative for cough, choking, chest tightness, shortness of breath and wheezing. COPD    Non smoker   Cardiovascular: Negative for chest pain, palpitations and leg swelling. Chronic Atrial fibrillation>>followed by Dr. Tanya Mcdaniel chronic anticoagulation    HTN, CAD   Gastrointestinal: Positive for constipation (due to narcotics>>managed with prn miralax). Negative for abdominal pain (treated with protonix by Dr. Shandra Higginbotham.  ), blood in stool, diarrhea, nausea and vomiting.    Endocrine: Negative for polydipsia, polyphagia and polyuria. Genitourinary: Negative for decreased urine volume, difficulty urinating, flank pain, frequency, hematuria and urgency. Musculoskeletal: Positive for arthralgias, back pain, gait problem and myalgias. Negative for joint swelling, neck pain and neck stiffness. Bilateral shoulder pain (R>L)>>chronic and unchanged    Fibromyalgia    Chronic left hip pain       Skin: Negative for color change, rash and wound. Neurological: Negative for tremors, syncope, light-headedness and headaches. Psychiatric/Behavioral: Negative for agitation, behavioral problems, confusion, decreased concentration, dysphoric mood, self-injury, sleep disturbance and suicidal ideas. The patient is not nervous/anxious. Objective:   Physical Exam   Constitutional: She is oriented to person, place, and time. She appears well-developed and well-nourished. No distress. HENT:   Head: Normocephalic and atraumatic. Eyes: Pupils are equal, round, and reactive to light. Conjunctivae are normal. Right eye exhibits no discharge. Left eye exhibits no discharge. No scleral icterus. Neck: Normal range of motion. Neck supple. No tracheal deviation present. No bruits     Cardiovascular: Normal rate, regular rhythm and normal heart sounds. Regular rate; irregular rhythm. Pulmonary/Chest: Effort normal and breath sounds normal. No respiratory distress. She has no wheezes. She has no rales. Abdominal: Soft. She exhibits no distension and no mass. There is no tenderness. There is no guarding. Musculoskeletal: She exhibits no edema. She walks with a cane or walker    OA changes noted in bilateral hands. Neurological: She is alert and oriented to person, place, and time. She exhibits normal muscle tone. Coordination normal.   Skin: Skin is warm and dry. She is not diaphoretic. Psychiatric: She has a normal mood and affect.  Her behavior is normal. Judgment and thought content normal. Flat affect is baseline for this patient. Nursing note and vitals reviewed. Assessment:       Diagnosis Orders   1. Lumbar radiculopathy  oxyCODONE-acetaminophen (PERCOCET) 7.5-325 MG per tablet    oxyCODONE (OXYCONTIN) 10 MG extended release tablet   2. Chronic pain syndrome  oxyCODONE-acetaminophen (PERCOCET) 7.5-325 MG per tablet    oxyCODONE (OXYCONTIN) 10 MG extended release tablet   3. Primary osteoarthritis of both hips  oxyCODONE-acetaminophen (PERCOCET) 7.5-325 MG per tablet    oxyCODONE (OXYCONTIN) 10 MG extended release tablet   4. Fibromyalgia  oxyCODONE-acetaminophen (PERCOCET) 7.5-325 MG per tablet    oxyCODONE (OXYCONTIN) 10 MG extended release tablet   5. Neuropathy  oxyCODONE-acetaminophen (PERCOCET) 7.5-325 MG per tablet    oxyCODONE (OXYCONTIN) 10 MG extended release tablet   6. Urinary frequency  POCT Urinalysis no Micro   7. Essential hypertension  lisinopril (PRINIVIL;ZESTRIL) 20 MG tablet    Comprehensive Metabolic Panel    XR CHEST STANDARD (2 VW)   8. Pedal edema  XR CHEST STANDARD (2 VW)    CBC Auto Differential    BRAIN NATRIURETIC PEPTIDE (BNP)   9. Pure hyperglyceridemia  Lipid Panel   10. SOB (shortness of breath) on exertion  XR CHEST STANDARD (2 VW)    CBC Auto Differential    BRAIN NATRIURETIC PEPTIDE (BNP)   11. Chronic obstructive pulmonary disease, unspecified COPD type (HCC)  albuterol sulfate HFA (PROAIR HFA) 108 (90 Base) MCG/ACT inhaler    oxyCODONE-acetaminophen (ENDOCET) 7.5-325 MG per tablet   12. Long term use of drug  Drug Panel-PM-HI Res-UR Interp-A     Chronic back pain secondary to DDD with radiculopathy>>gradually worsening. Patient continues to refuse referral to pain management and refuses any surgery. Chronic pain>>multifactoral>>chronic low back pain, chronic bilateral hip OA, chronic bilateral shoulder pain, chronic neuropathy and fibromyalgia    Chronic bilateral hip pain>>gradually worsening.  Does not want further evaluation or referral.    Urinary

## 2019-06-30 LAB

## 2019-07-02 ENCOUNTER — ANTI-COAG VISIT (OUTPATIENT)
Dept: PHARMACY | Age: 84
End: 2019-07-02
Payer: MEDICARE

## 2019-07-02 DIAGNOSIS — I48.91 ATRIAL FIBRILLATION WITH RAPID VENTRICULAR RESPONSE (HCC): ICD-10-CM

## 2019-07-02 LAB — INTERNATIONAL NORMALIZATION RATIO, POC: 2.4

## 2019-07-02 PROCEDURE — 85610 PROTHROMBIN TIME: CPT

## 2019-07-02 PROCEDURE — 99211 OFF/OP EST MAY X REQ PHY/QHP: CPT

## 2019-07-03 DIAGNOSIS — M16.0 PRIMARY OSTEOARTHRITIS OF BOTH HIPS: ICD-10-CM

## 2019-07-03 DIAGNOSIS — M54.16 LUMBAR RADICULOPATHY: ICD-10-CM

## 2019-07-03 DIAGNOSIS — G89.4 CHRONIC PAIN SYNDROME: ICD-10-CM

## 2019-07-03 DIAGNOSIS — M79.7 FIBROMYALGIA: ICD-10-CM

## 2019-07-03 DIAGNOSIS — G62.9 NEUROPATHY: ICD-10-CM

## 2019-07-03 RX ORDER — OXYCODONE AND ACETAMINOPHEN 7.5; 325 MG/1; MG/1
1 TABLET ORAL 2 TIMES DAILY
Qty: 60 TABLET | Refills: 0 | OUTPATIENT
Start: 2019-08-02 | End: 2019-09-01

## 2019-07-03 RX ORDER — OXYCODONE AND ACETAMINOPHEN 7.5; 325 MG/1; MG/1
1 TABLET ORAL 2 TIMES DAILY
Qty: 60 TABLET | Refills: 0 | Status: SHIPPED | OUTPATIENT
Start: 2019-07-03 | End: 2019-08-01 | Stop reason: SDUPTHER

## 2019-07-03 RX ORDER — OXYCODONE HCL 10 MG/1
10 TABLET, FILM COATED, EXTENDED RELEASE ORAL EVERY 12 HOURS
Qty: 60 TABLET | Refills: 0 | Status: SHIPPED | OUTPATIENT
Start: 2019-09-01 | End: 2019-09-19 | Stop reason: SDUPTHER

## 2019-07-03 RX ORDER — OXYCODONE HCL 10 MG/1
10 TABLET, FILM COATED, EXTENDED RELEASE ORAL EVERY 12 HOURS
Qty: 60 TABLET | Refills: 0 | Status: SHIPPED | OUTPATIENT
Start: 2019-08-02 | End: 2020-01-24

## 2019-07-03 NOTE — TELEPHONE ENCOUNTER
[employer] calling requesting RF on  oxyCODONE-acetaminophen (PERCOCET) 7.5-325 MG per tablet.

## 2019-07-23 ENCOUNTER — TELEPHONE (OUTPATIENT)
Dept: PHARMACY | Age: 84
End: 2019-07-23

## 2019-07-23 NOTE — TELEPHONE ENCOUNTER
Mrs. Blossom Alexander called to change her appt. From 7/30/19. She has an appt at [facility] at ~ the same time. Changed to 8/6/19. INR has been stable. She wanted to be seen at [facility].      Kalpana Falcon, PharmD, [facility]  Anticoagulation Service  [phone]

## 2019-08-01 ENCOUNTER — TELEPHONE (OUTPATIENT)
Dept: PRIMARY CARE CLINIC | Age: 84
End: 2019-08-01

## 2019-08-01 DIAGNOSIS — M16.0 PRIMARY OSTEOARTHRITIS OF BOTH HIPS: ICD-10-CM

## 2019-08-01 DIAGNOSIS — M79.7 FIBROMYALGIA: ICD-10-CM

## 2019-08-01 DIAGNOSIS — G89.4 CHRONIC PAIN SYNDROME: ICD-10-CM

## 2019-08-01 DIAGNOSIS — M54.16 LUMBAR RADICULOPATHY: ICD-10-CM

## 2019-08-01 DIAGNOSIS — G62.9 NEUROPATHY: ICD-10-CM

## 2019-08-01 RX ORDER — OXYCODONE AND ACETAMINOPHEN 7.5; 325 MG/1; MG/1
1 TABLET ORAL 2 TIMES DAILY
Qty: 60 TABLET | Refills: 0 | Status: SHIPPED | OUTPATIENT
Start: 2019-08-02 | End: 2019-09-19 | Stop reason: SDUPTHER

## 2019-08-06 ENCOUNTER — ANTI-COAG VISIT (OUTPATIENT)
Dept: PHARMACY | Age: 84
End: 2019-08-06
Payer: MEDICARE

## 2019-08-06 DIAGNOSIS — I48.91 ATRIAL FIBRILLATION WITH RAPID VENTRICULAR RESPONSE (HCC): ICD-10-CM

## 2019-08-06 LAB — INTERNATIONAL NORMALIZATION RATIO, POC: 1.2

## 2019-08-06 PROCEDURE — 99211 OFF/OP EST MAY X REQ PHY/QHP: CPT

## 2019-08-06 PROCEDURE — 85610 PROTHROMBIN TIME: CPT

## 2019-08-06 NOTE — PROGRESS NOTES
Ms. Sommer Barahona is a 80 y.o.  female with history of Afib who presents today for anticoagulation monitoring and adjustment. Patient verifies current dosing regimen. Patient denies s/s bleeding/bruising/swelling/SOB. No blood in urine or stool. No dietary changes. No changes in medication/OTC agents/Herbals. No change in alcohol use. No missed doses. No Procedures scheduled in the future at this time. Lab Results   Component Value Date    INR 1.2 08/06/2019    INR 2.4 07/02/2019    INR 3 05/28/2019       Patient appears well. She was in a hurry to get going and was not very talkative today. She states no changes, no problems. She denies any missed dose of Warfarin. I suspect she has missed some doses in order for her INR to be this low today. Patient states \"I'll just take my Warfarin the way I have been and my number will come back up\". I am going to give patient extra Warfarin today. I would like patient to come back next week for INR, hopefully she keeps her appointment. Coumadin Dose :   Take Warfarin 7.5mg today 8/6/19. Then Warfarin 5 mg daily except 7.5 mg on Mondays, Wednesday and Fridays. Return in 10 days. Patient reminded to call the Anticoagulation Clinic with any medication changes. Patient given instructions utilizing the teach back method. After visit summary printed and reviewed with patient. Warfarin dose updated.

## 2019-08-13 RX ORDER — AMLODIPINE BESYLATE 5 MG/1
TABLET ORAL
Qty: 90 TABLET | Refills: 0 | Status: SHIPPED | OUTPATIENT
Start: 2019-08-13 | End: 2019-09-19 | Stop reason: SDUPTHER

## 2019-09-03 ENCOUNTER — ANTI-COAG VISIT (OUTPATIENT)
Dept: PHARMACY | Age: 84
End: 2019-09-03
Payer: MEDICARE

## 2019-09-03 DIAGNOSIS — I48.91 ATRIAL FIBRILLATION WITH RAPID VENTRICULAR RESPONSE (HCC): ICD-10-CM

## 2019-09-03 LAB — INTERNATIONAL NORMALIZATION RATIO, POC: 2.2

## 2019-09-03 PROCEDURE — 99211 OFF/OP EST MAY X REQ PHY/QHP: CPT

## 2019-09-03 PROCEDURE — 85610 PROTHROMBIN TIME: CPT

## 2019-09-03 NOTE — PROGRESS NOTES
Ms. Micheline Sierra is a 80 y.o.  female with history of Afib who presents today for anticoagulation monitoring and adjustment. Patient verifies current dosing regimen  Patient denies s/s bleeding/bruising/swelling/SOB  No blood in urine or stool. No dietary changes. No changes in medication/OTC agents/Herbals. No change in alcohol use. No missed doses. No Procedures scheduled in the future at this time. Lab Results   Component Value Date    INR 2.2 09/03/2019    INR 1.2 08/06/2019    INR 2.4 07/02/2019       Pertinent findings: None    Warfarin dosing: Continue Warfarin 5 mg daily except 7.5 mg on Mondays, Wednesday and Fridays. After visit summary printed and reviewed with patient. Medications reviewed and updated on home medication list: No: Patient states no changes  Warfarin dose updated on patient home medication list: Yes    Called RX to [facility], phone [phone]  Coumadin 5mg tablets  #40 tablets,   5 mg daily except 7.5 mg on Mondays, Wednesday and Fridays.   [facility]  Physician Dr. Elis Stone

## 2019-09-04 ENCOUNTER — TELEPHONE (OUTPATIENT)
Dept: ORTHOPEDIC SURGERY | Age: 84
End: 2019-09-04

## 2019-09-19 ENCOUNTER — OFFICE VISIT (OUTPATIENT)
Dept: PRIMARY CARE CLINIC | Age: 84
End: 2019-09-19
Payer: MEDICARE

## 2019-09-19 VITALS
HEART RATE: 77 BPM | BODY MASS INDEX: 33.49 KG/M2 | HEIGHT: 63 IN | DIASTOLIC BLOOD PRESSURE: 72 MMHG | WEIGHT: 189 LBS | SYSTOLIC BLOOD PRESSURE: 137 MMHG | OXYGEN SATURATION: 96 % | TEMPERATURE: 99.3 F

## 2019-09-19 DIAGNOSIS — R60.0 PEDAL EDEMA: ICD-10-CM

## 2019-09-19 DIAGNOSIS — I48.91 ATRIAL FIBRILLATION, UNSPECIFIED TYPE (HCC): ICD-10-CM

## 2019-09-19 DIAGNOSIS — M16.0 PRIMARY OSTEOARTHRITIS OF BOTH HIPS: ICD-10-CM

## 2019-09-19 DIAGNOSIS — M79.7 FIBROMYALGIA: ICD-10-CM

## 2019-09-19 DIAGNOSIS — Z79.899 LONG TERM USE OF DRUG: ICD-10-CM

## 2019-09-19 DIAGNOSIS — G89.4 CHRONIC PAIN SYNDROME: Primary | ICD-10-CM

## 2019-09-19 DIAGNOSIS — J44.9 CHRONIC OBSTRUCTIVE PULMONARY DISEASE, UNSPECIFIED COPD TYPE (HCC): ICD-10-CM

## 2019-09-19 DIAGNOSIS — I10 ESSENTIAL HYPERTENSION: ICD-10-CM

## 2019-09-19 DIAGNOSIS — R06.02 SHORTNESS OF BREATH: ICD-10-CM

## 2019-09-19 DIAGNOSIS — M54.16 LUMBAR RADICULOPATHY: ICD-10-CM

## 2019-09-19 DIAGNOSIS — R25.2 MUSCLE CRAMPS: ICD-10-CM

## 2019-09-19 DIAGNOSIS — G62.9 NEUROPATHY: ICD-10-CM

## 2019-09-19 PROCEDURE — 99214 OFFICE O/P EST MOD 30 MIN: CPT | Performed by: NURSE PRACTITIONER

## 2019-09-19 PROCEDURE — 1036F TOBACCO NON-USER: CPT | Performed by: NURSE PRACTITIONER

## 2019-09-19 PROCEDURE — G8926 SPIRO NO PERF OR DOC: HCPCS | Performed by: NURSE PRACTITIONER

## 2019-09-19 PROCEDURE — 4040F PNEUMOC VAC/ADMIN/RCVD: CPT | Performed by: NURSE PRACTITIONER

## 2019-09-19 PROCEDURE — G8598 ASA/ANTIPLAT THER USED: HCPCS | Performed by: NURSE PRACTITIONER

## 2019-09-19 PROCEDURE — G8400 PT W/DXA NO RESULTS DOC: HCPCS | Performed by: NURSE PRACTITIONER

## 2019-09-19 PROCEDURE — 1123F ACP DISCUSS/DSCN MKR DOCD: CPT | Performed by: NURSE PRACTITIONER

## 2019-09-19 PROCEDURE — 3023F SPIROM DOC REV: CPT | Performed by: NURSE PRACTITIONER

## 2019-09-19 PROCEDURE — G8427 DOCREV CUR MEDS BY ELIG CLIN: HCPCS | Performed by: NURSE PRACTITIONER

## 2019-09-19 PROCEDURE — G8417 CALC BMI ABV UP PARAM F/U: HCPCS | Performed by: NURSE PRACTITIONER

## 2019-09-19 PROCEDURE — 1090F PRES/ABSN URINE INCON ASSESS: CPT | Performed by: NURSE PRACTITIONER

## 2019-09-19 RX ORDER — WARFARIN SODIUM 5 MG/1
TABLET ORAL
Qty: 120 TABLET | Refills: 5 | Status: SHIPPED | OUTPATIENT
Start: 2019-09-19 | End: 2020-01-01

## 2019-09-19 RX ORDER — CARVEDILOL 12.5 MG/1
12.5 TABLET ORAL 2 TIMES DAILY WITH MEALS
Qty: 180 TABLET | Refills: 1 | Status: SHIPPED | OUTPATIENT
Start: 2019-09-19 | End: 2020-01-01 | Stop reason: SINTOL

## 2019-09-19 RX ORDER — ALBUTEROL SULFATE 90 UG/1
2 AEROSOL, METERED RESPIRATORY (INHALATION) EVERY 6 HOURS PRN
Qty: 1 INHALER | Refills: 5 | Status: SHIPPED | OUTPATIENT
Start: 2019-09-19 | End: 2020-01-01 | Stop reason: SDUPTHER

## 2019-09-19 RX ORDER — OXYCODONE AND ACETAMINOPHEN 7.5; 325 MG/1; MG/1
1 TABLET ORAL 2 TIMES DAILY
Qty: 60 TABLET | Refills: 0 | Status: SHIPPED | OUTPATIENT
Start: 2019-10-01 | End: 2019-09-24 | Stop reason: SDUPTHER

## 2019-09-19 RX ORDER — LISINOPRIL 20 MG/1
20 TABLET ORAL DAILY
Qty: 90 TABLET | Refills: 1 | Status: SHIPPED | OUTPATIENT
Start: 2019-09-19 | End: 2020-01-01 | Stop reason: DRUGHIGH

## 2019-09-19 RX ORDER — AMLODIPINE BESYLATE 5 MG/1
TABLET ORAL
Qty: 90 TABLET | Refills: 1 | Status: SHIPPED | OUTPATIENT
Start: 2019-09-19 | End: 2020-01-01 | Stop reason: SDUPTHER

## 2019-09-19 RX ORDER — OXYCODONE AND ACETAMINOPHEN 7.5; 325 MG/1; MG/1
1 TABLET ORAL 2 TIMES DAILY
Qty: 60 TABLET | Refills: 0 | Status: SHIPPED | OUTPATIENT
Start: 2019-10-31 | End: 2019-11-05 | Stop reason: SDUPTHER

## 2019-09-19 RX ORDER — OXYCODONE AND ACETAMINOPHEN 7.5; 325 MG/1; MG/1
1 TABLET ORAL 2 TIMES DAILY
Qty: 60 TABLET | Refills: 0 | Status: SHIPPED | OUTPATIENT
Start: 2019-11-30 | End: 2019-09-24 | Stop reason: SDUPTHER

## 2019-09-19 RX ORDER — HYDROCODONE BITARTRATE 30 MG/1
30 TABLET, EXTENDED RELEASE ORAL EVERY 24 HOURS
Qty: 30 TABLET | Refills: 0 | Status: SHIPPED | OUTPATIENT
Start: 2019-10-31 | End: 2019-09-24 | Stop reason: SDUPTHER

## 2019-09-19 RX ORDER — HYDROCODONE BITARTRATE 30 MG/1
30 TABLET, EXTENDED RELEASE ORAL EVERY 24 HOURS
Qty: 30 TABLET | Refills: 0 | Status: SHIPPED | OUTPATIENT
Start: 2019-10-01 | End: 2019-09-24 | Stop reason: SDUPTHER

## 2019-09-19 RX ORDER — HYDROCODONE BITARTRATE 30 MG/1
30 TABLET, EXTENDED RELEASE ORAL EVERY 24 HOURS
Qty: 30 TABLET | Refills: 0 | Status: SHIPPED | OUTPATIENT
Start: 2019-11-30 | End: 2019-11-05

## 2019-09-19 ASSESSMENT — ENCOUNTER SYMPTOMS
CHOKING: 0
VOMITING: 0
COLOR CHANGE: 0
BACK PAIN: 1
SHORTNESS OF BREATH: 0
DIARRHEA: 0
TROUBLE SWALLOWING: 0
NAUSEA: 0
ABDOMINAL PAIN: 0
WHEEZING: 0
VOICE CHANGE: 0
COUGH: 0
CHEST TIGHTNESS: 0
CONSTIPATION: 1
PHOTOPHOBIA: 0

## 2019-09-19 NOTE — PROGRESS NOTES
Res-UR Interp-A   8. Neuropathy  HYDROcodone (HYSINGLA ER) 30 MG extended release tablet    oxyCODONE-acetaminophen (PERCOCET) 7.5-325 MG per tablet    oxyCODONE-acetaminophen (PERCOCET) 7.5-325 MG per tablet   9. Atrial fibrillation, unspecified type ([facility] [address]     10. Muscle cramps  BASIC METABOLIC PANEL    CBC    TSH with Reflex    VITAMIN B12 & FOLATE   11. Shortness of breath  BASIC METABOLIC PANEL    BRAIN NATRIURETIC PEPTIDE (BNP)    CBC    TSH with Reflex   12. Pedal edema  BASIC METABOLIC PANEL    BRAIN NATRIURETIC PEPTIDE (BNP)    CBC    TSH with Reflex     Chronic DDD with lumbar radiculopathy and generalized arthralgias>>she is given a referral to pain management. Patient was advised that there will be no provider at this office who will supply any further scheduled medications after my departure. Fibromyalgia>>patient's insurance will no longer cover oxycontin. I am switching her to Hysingla per the formulary request.    HTN>>stable. COPD>>stable, but due to persistent sob on exertion, will check cxr    Atrial fibrillation>>rate controlled and anticoagulated. Followed by cardiology    Muscle cramps>>will check labs for further evaluation. Pedal edema>>chronic but slightly worse. She has not been taking lasix, and is advised to do so (with potassium). She reports that she does not need a refill because she has numerous tablets at home. Plan:           Discussed use, benefit, and side effects of prescribed medications. Barriers to medication compliance addressed. All patient questions answered. Pt voiced understanding.            Qi Ambrosio, APRN - CNP

## 2019-09-22 LAB
6-ACETYLMORPHINE: NOT DETECTED
7-AMINOCLONAZEPAM: NOT DETECTED
ALPHA-OH-ALPRAZOLAM: NOT DETECTED
ALPRAZOLAM: NOT DETECTED
AMPHETAMINE: NOT DETECTED
BARBITURATES: NOT DETECTED
BENZOYLECGONINE: NOT DETECTED
BUPRENORPHINE: NOT DETECTED
CARISOPRODOL: NOT DETECTED
CLONAZEPAM: NOT DETECTED
CODEINE: NOT DETECTED
CREATININE URINE: 59.6 MG/DL (ref 20–400)
DIAZEPAM: NOT DETECTED
DRUGS EXPECTED: NORMAL
EER PAIN MGT DRUG PANEL, HIGH RES/EMIT U: NORMAL
ETHYL GLUCURONIDE: NOT DETECTED
FENTANYL: NOT DETECTED
HYDROCODONE: NOT DETECTED
HYDROMORPHONE: NOT DETECTED
LORAZEPAM: NOT DETECTED
MARIJUANA METABOLITE: NOT DETECTED
MDA: NOT DETECTED
MDEA: NOT DETECTED
MDMA URINE: NOT DETECTED
MEPERIDINE: NOT DETECTED
METHADONE: NOT DETECTED
METHAMPHETAMINE: NOT DETECTED
METHYLPHENIDATE: NOT DETECTED
MIDAZOLAM: NOT DETECTED
MORPHINE: NOT DETECTED
NORBUPRENORPHINE, FREE: NOT DETECTED
NORDIAZEPAM: NOT DETECTED
NORFENTANYL: NOT DETECTED
NORHYDROCODONE, URINE: NOT DETECTED
NOROXYCODONE: PRESENT
NOROXYMORPHONE, URINE: PRESENT
OXAZEPAM: NOT DETECTED
OXYCODONE: PRESENT
OXYMORPHONE: NOT DETECTED
PAIN MANAGEMENT DRUG PANEL: NORMAL
PAIN MANAGEMENT DRUG PANEL: NORMAL
PCP: NOT DETECTED
PHENTERMINE: NOT DETECTED
PROPOXYPHENE: NOT DETECTED
TAPENTADOL, URINE: NOT DETECTED
TAPENTADOL-O-SULFATE, URINE: NOT DETECTED
TEMAZEPAM: NOT DETECTED
TRAMADOL: NOT DETECTED
ZOLPIDEM: NOT DETECTED

## 2019-09-23 NOTE — PROGRESS NOTES
cramps, clots  MUSC/SKEL: + pain, stiffness, arthritis  PSYCH: Denies anxiety, depression, stress  NEURO: Denies  change in mood or memory  HEME: Denies abn bruising, bleeding, anemia  ENDO: Denies intolerance to heat, cold, excessive thirst or hunger, hx thyroid disease    /78   Pulse 74   Ht 5' 3\" (1.6 m)   Wt 193 lb (87.5 kg)   SpO2 98%   BMI 34.19 kg/m²   Wt Readings from Last 3 Encounters:   09/24/19 193 lb (87.5 kg)   09/19/19 189 lb (85.7 kg)   06/27/19 190 lb (86.2 kg)       Physical Exam:  GEN: Appears well, no acute distress  SKIN: Pink, warm, dry. Nails without clubbing. HEENT: PERRLA. Normocephalic, atraumatic. Neck supple. No adenopathy. LUNG: AP diameter normal. No crackles. Few mild exp wheeze bilateral upper airways. Respiratory effort normal.  HEART: S1S2 A/IRR. No JVD. No carotid bruit. No murmur, rub or gallop. ABD: Soft, nontender. +BS X 4 quads. No hepatomegaly. EXT: Radial and pedal pulses 2+ and symmetric. Without varicosities. No edema. MUSCSKEL: Good ROM X4 extremities. No deformity. NEURO: A/O X3. Calm and cooperative. Past Medical History:   has a past medical history of A fib, Arthritis, COPD (chronic obstructive pulmonary disease) ([facility] [address] Coronary artery disease, Femur fracture (HCC), Fibromyalgia, GERD (gastroesophageal reflux disease), History of shingles, Hypertension, PONV (postoperative nausea and vomiting), Rotator cuff disorder, and Ulcer of gastroesophageal junction. Surgical History:   has a past surgical history that includes Appendectomy (age 21); Colonoscopy (05.01.03); shoulder surgery; Cholecystectomy; Acromioplasty (Left, 4/27/15); Hip fracture surgery (Left, 01/2016); Colonoscopy (02/2017); Cataract removal; pr esophagogastroduodenoscopy us scope w/adj strxrs (N/A, 11/1/2018); Upper gastrointestinal endoscopy (N/A, 11/1/2018); and Esophagus dilation (N/A, 11/1/2018). Social History:   reports that she quit smoking about 39 years ago.  Her smoking use included cigarettes. She has a 20.00 pack-year smoking history. She has never used smokeless tobacco. She reports that she does not drink alcohol or use drugs. Family History:   Family History   Problem Relation Age of Onset    Stroke Mother     COPD Father     Coronary Art Dis Father     Other Brother         AAA rupture    Other Brother         cerebral hemorrhage    Heart Attack Son         cerebral hemorrhage as well       HomeMedications:  Prior to Admission medications    Medication Sig Start Date End Date Taking? Authorizing Provider   lisinopril (PRINIVIL;ZESTRIL) 20 MG tablet Take 1 tablet by mouth daily 9/19/19  Yes ANDREZ Gómez CNP   carvedilol (COREG) 12.5 MG tablet Take 1 tablet by mouth 2 times daily (with meals) 9/19/19  Yes ANDREZ Gómez CNP   amLODIPine (NORVASC) 5 MG tablet TAKE ONE TABLET BY MOUTH DAILY 9/19/19  Yes ANDREZ Gómez CNP   albuterol sulfate HFA (PROAIR HFA) 108 (90 Base) MCG/ACT inhaler Inhale 2 puffs into the lungs every 6 hours as needed for Wheezing 9/19/19  Yes ANDREZ Gómez CNP   warfarin (COUMADIN) 5 MG tablet Take 5mg by mouth daily EXCEPT 7.5mg every Monday, Wednesday and Friday or as directed by J[facility] [phone] 9/19/19  Yes ANDREZ Gómez CNP   HY[facility]Ocodone [facility]) 30 MG extended release tablet Take 30 mg by mouth every 24 hours for 30 days. 11/30/19 12/30/19 Yes ANDREZ Gómez CNP   oxyCODONE-acetaminophen (PERCOCET) 7.5-325 MG per tablet Take 1 tablet by mouth 2 times daily for 30 days. 10/31/19 11/30/19 Yes ANDREZ Gómez CNP   naloxone 4 MG/0.1ML LIQD nasal spray 1 spray by Nasal route as needed for Opioid Reversal 3/29/19  Yes ANDREZ Gómez CNP        Allergies:  Levaquin [levofloxacin in d5w] and Digoxin       LABS: Results reviewed with patient today.     CBC:   Lab Results   Component Value Date 8/24/18 Afib- controlled rate  Atrial fibrillationConfirmed by JAY MALDONADO, Lisa Page ([id number]) on 3/6/2019 11:51:56 AM   Today, AF HR 76    Image Review:      ECHO 11/2017  Left ventricle size is normal. Normal left ventricular wall thickness. Ejection fraction is visually estimated to be 60-65%. No regional wall  motion abnormalities are noted. Patient appears to be in atrial  fibrillation. Normal right ventricular size and function. fixed echogenic structure noted on the atrial side of the mitral annulus  that could represent a focal calcification. No evidence of mitral valve stenosis. Mild to moderate mitral regurgitation is present. There is mild tricuspid regurgitation with PASP estimated at 33 mmHg.     Echo 5/2015  Pt supine and arm in sling from recent shoulder surgery. Normal left ventricle size, wall thickness and an estimated ejection  fraction of 55%. No regional wall motion abnormalities are seen. The right ventricle is normal in size and function. Lexiscan Stress: 6/12/2018  Summary    Pharmacological Stress/MPI Results:        1. Technically a satisfactory study.    2. Normal pharmacological stress portion of the study.    3. No evidence of Ischemia by Myocardial Perfusion Imaging.    4. Gated Study not performed because of Atrial fibrillation.             Assessment/Plan:    1.) Chronic Atrial Fib: Rate controlled. CHADSVASC- 4    HASBLED- 1  Thromboembolic risk reduction: warfarin, goal INR 2-3  Continue carvedilol    2.) Hypertension: Contnue lisinopril and carvedilol and norvasc  Goal BP <130/80.  Met  Non pharmacologic interventions include:   -weight loss  -heart healthy low sodium and low fat diet that consist of mostly fruits, vegetables and grains (Dash diet)  -limited amount of alcohol (no more than 1 drink/day for women, 2 drinks/day for men)  -regular physical activity  -no smoking  -stress reduction    3.) Left lower leg numbness: Strong pulse without evidence of circulation issues. Skin pink,warm, dry. Most likely from hx of hip fx or fibromyalgia  Defer further work up to pain management and PCP    4. )COPD: SOB unchanged. No evidence of HF.   -OK to stop lasix  -COPD tx per PCP    Instructions:   1. Medications: Continue with current medications. No need to restart lasix at this time. 2. Labs: as ordered by PCP  3. Follow up: 3-6 months  4. 2 gm sodium diet  5.  May benefit from physical therapy - discuss with primary care      I appreciate the opportunity of cooperating in the care of this individual.    AMY Joline Dubin, CNP, 9/24/2019,11:17 AM

## 2019-09-24 ENCOUNTER — TELEPHONE (OUTPATIENT)
Dept: PAIN MANAGEMENT | Age: 84
End: 2019-09-24

## 2019-09-24 ENCOUNTER — OFFICE VISIT (OUTPATIENT)
Dept: CARDIOLOGY CLINIC | Age: 84
End: 2019-09-24
Payer: MEDICARE

## 2019-09-24 VITALS
WEIGHT: 193 LBS | BODY MASS INDEX: 34.2 KG/M2 | HEIGHT: 63 IN | DIASTOLIC BLOOD PRESSURE: 78 MMHG | SYSTOLIC BLOOD PRESSURE: 122 MMHG | OXYGEN SATURATION: 98 % | HEART RATE: 74 BPM

## 2019-09-24 DIAGNOSIS — I48.20 ATRIAL FIBRILLATION, CHRONIC (HCC): ICD-10-CM

## 2019-09-24 DIAGNOSIS — R53.82 CHRONIC FATIGUE: ICD-10-CM

## 2019-09-24 DIAGNOSIS — I10 ESSENTIAL HYPERTENSION: ICD-10-CM

## 2019-09-24 DIAGNOSIS — I48.91 ATRIAL FIBRILLATION WITH RAPID VENTRICULAR RESPONSE (HCC): Primary | ICD-10-CM

## 2019-09-24 DIAGNOSIS — J44.1 COPD EXACERBATION (HCC): ICD-10-CM

## 2019-09-24 LAB
ANION GAP SERPL CALCULATED.3IONS-SCNC: 15 MMOL/L (ref 3–16)
BUN BLDV-MCNC: 14 MG/DL (ref 7–20)
CALCIUM SERPL-MCNC: 9.7 MG/DL (ref 8.3–10.6)
CHLORIDE BLD-SCNC: 97 MMOL/L (ref 99–110)
CO2: 26 MMOL/L (ref 21–32)
CREAT SERPL-MCNC: 0.6 MG/DL (ref 0.6–1.2)
FOLATE: 19.69 NG/ML (ref 4.78–24.2)
GFR AFRICAN AMERICAN: >60
GFR NON-AFRICAN AMERICAN: >60
GLUCOSE BLD-MCNC: 129 MG/DL (ref 70–99)
HCT VFR BLD CALC: 41.8 % (ref 36–48)
HEMOGLOBIN: 13.9 G/DL (ref 12–16)
MCH RBC QN AUTO: 30.5 PG (ref 26–34)
MCHC RBC AUTO-ENTMCNC: 33.1 G/DL (ref 31–36)
MCV RBC AUTO: 92.1 FL (ref 80–100)
PDW BLD-RTO: 14.5 % (ref 12.4–15.4)
PLATELET # BLD: 200 K/UL (ref 135–450)
PMV BLD AUTO: 8.4 FL (ref 5–10.5)
POTASSIUM SERPL-SCNC: 4.8 MMOL/L (ref 3.5–5.1)
PRO-BNP: 339 PG/ML (ref 0–449)
RBC # BLD: 4.54 M/UL (ref 4–5.2)
SODIUM BLD-SCNC: 138 MMOL/L (ref 136–145)
TSH REFLEX: 2.75 UIU/ML (ref 0.27–4.2)
VITAMIN B-12: 586 PG/ML (ref 211–911)
WBC # BLD: 8.2 K/UL (ref 4–11)

## 2019-09-24 PROCEDURE — 93000 ELECTROCARDIOGRAM COMPLETE: CPT | Performed by: NURSE PRACTITIONER

## 2019-09-24 PROCEDURE — 99213 OFFICE O/P EST LOW 20 MIN: CPT | Performed by: NURSE PRACTITIONER

## 2019-10-04 ENCOUNTER — ANTI-COAG VISIT (OUTPATIENT)
Dept: PHARMACY | Age: 84
End: 2019-10-04
Payer: MEDICARE

## 2019-10-04 VITALS — HEART RATE: 85 BPM | DIASTOLIC BLOOD PRESSURE: 82 MMHG | SYSTOLIC BLOOD PRESSURE: 144 MMHG

## 2019-10-04 DIAGNOSIS — I48.91 ATRIAL FIBRILLATION WITH RAPID VENTRICULAR RESPONSE (HCC): ICD-10-CM

## 2019-10-04 LAB — INTERNATIONAL NORMALIZATION RATIO, POC: 2

## 2019-10-04 PROCEDURE — 85610 PROTHROMBIN TIME: CPT

## 2019-10-04 PROCEDURE — 99211 OFF/OP EST MAY X REQ PHY/QHP: CPT

## 2019-10-22 ENCOUNTER — TELEPHONE (OUTPATIENT)
Dept: PRIMARY CARE CLINIC | Age: 84
End: 2019-10-22

## 2019-11-01 ENCOUNTER — TELEPHONE (OUTPATIENT)
Dept: PHARMACY | Age: 84
End: 2019-11-01

## 2019-11-01 ENCOUNTER — ANTI-COAG VISIT (OUTPATIENT)
Dept: PHARMACY | Age: 84
End: 2019-11-01
Payer: MEDICARE

## 2019-11-01 VITALS — HEART RATE: 83 BPM | DIASTOLIC BLOOD PRESSURE: 78 MMHG | OXYGEN SATURATION: 96 % | SYSTOLIC BLOOD PRESSURE: 143 MMHG

## 2019-11-01 DIAGNOSIS — I48.91 ATRIAL FIBRILLATION WITH RAPID VENTRICULAR RESPONSE (HCC): ICD-10-CM

## 2019-11-01 LAB — INTERNATIONAL NORMALIZATION RATIO, POC: 1.5

## 2019-11-01 PROCEDURE — 99211 OFF/OP EST MAY X REQ PHY/QHP: CPT

## 2019-11-01 PROCEDURE — 85610 PROTHROMBIN TIME: CPT

## 2019-11-05 ENCOUNTER — OFFICE VISIT (OUTPATIENT)
Dept: PRIMARY CARE CLINIC | Age: 84
End: 2019-11-05
Payer: MEDICARE

## 2019-11-05 VITALS
DIASTOLIC BLOOD PRESSURE: 80 MMHG | WEIGHT: 191.6 LBS | TEMPERATURE: 98.6 F | OXYGEN SATURATION: 94 % | HEART RATE: 90 BPM | BODY MASS INDEX: 33.95 KG/M2 | HEIGHT: 63 IN | SYSTOLIC BLOOD PRESSURE: 130 MMHG

## 2019-11-05 DIAGNOSIS — M79.7 FIBROMYALGIA: ICD-10-CM

## 2019-11-05 DIAGNOSIS — I10 ESSENTIAL HYPERTENSION: ICD-10-CM

## 2019-11-05 DIAGNOSIS — M16.0 PRIMARY OSTEOARTHRITIS OF BOTH HIPS: ICD-10-CM

## 2019-11-05 DIAGNOSIS — G62.9 NEUROPATHY: ICD-10-CM

## 2019-11-05 DIAGNOSIS — Z79.899 LONG TERM USE OF DRUG: ICD-10-CM

## 2019-11-05 DIAGNOSIS — M54.16 LUMBAR RADICULOPATHY: ICD-10-CM

## 2019-11-05 DIAGNOSIS — G89.4 CHRONIC PAIN SYNDROME: Primary | ICD-10-CM

## 2019-11-05 DIAGNOSIS — J44.9 CHRONIC OBSTRUCTIVE PULMONARY DISEASE, UNSPECIFIED COPD TYPE (HCC): ICD-10-CM

## 2019-11-05 DIAGNOSIS — Z23 IMMUNIZATION DUE: ICD-10-CM

## 2019-11-05 PROCEDURE — G8598 ASA/ANTIPLAT THER USED: HCPCS | Performed by: NURSE PRACTITIONER

## 2019-11-05 PROCEDURE — G0008 ADMIN INFLUENZA VIRUS VAC: HCPCS | Performed by: NURSE PRACTITIONER

## 2019-11-05 PROCEDURE — 1036F TOBACCO NON-USER: CPT | Performed by: NURSE PRACTITIONER

## 2019-11-05 PROCEDURE — 4040F PNEUMOC VAC/ADMIN/RCVD: CPT | Performed by: NURSE PRACTITIONER

## 2019-11-05 PROCEDURE — G8482 FLU IMMUNIZE ORDER/ADMIN: HCPCS | Performed by: NURSE PRACTITIONER

## 2019-11-05 PROCEDURE — 1123F ACP DISCUSS/DSCN MKR DOCD: CPT | Performed by: NURSE PRACTITIONER

## 2019-11-05 PROCEDURE — G8417 CALC BMI ABV UP PARAM F/U: HCPCS | Performed by: NURSE PRACTITIONER

## 2019-11-05 PROCEDURE — 3023F SPIROM DOC REV: CPT | Performed by: NURSE PRACTITIONER

## 2019-11-05 PROCEDURE — 99214 OFFICE O/P EST MOD 30 MIN: CPT | Performed by: NURSE PRACTITIONER

## 2019-11-05 PROCEDURE — 90653 IIV ADJUVANT VACCINE IM: CPT | Performed by: NURSE PRACTITIONER

## 2019-11-05 PROCEDURE — G8427 DOCREV CUR MEDS BY ELIG CLIN: HCPCS | Performed by: NURSE PRACTITIONER

## 2019-11-05 PROCEDURE — 1090F PRES/ABSN URINE INCON ASSESS: CPT | Performed by: NURSE PRACTITIONER

## 2019-11-05 PROCEDURE — G8400 PT W/DXA NO RESULTS DOC: HCPCS | Performed by: NURSE PRACTITIONER

## 2019-11-05 PROCEDURE — G8926 SPIRO NO PERF OR DOC: HCPCS | Performed by: NURSE PRACTITIONER

## 2019-11-05 RX ORDER — OXYCODONE AND ACETAMINOPHEN 7.5; 325 MG/1; MG/1
1 TABLET ORAL EVERY 6 HOURS PRN
Qty: 120 TABLET | Refills: 0 | Status: SHIPPED | OUTPATIENT
Start: 2019-11-15 | End: 2020-01-24

## 2019-11-05 RX ORDER — OXYCODONE AND ACETAMINOPHEN 7.5; 325 MG/1; MG/1
1 TABLET ORAL 2 TIMES DAILY
Qty: 60 TABLET | Refills: 0 | Status: SHIPPED | OUTPATIENT
Start: 2020-01-14 | End: 2020-01-24 | Stop reason: SDUPTHER

## 2019-11-05 RX ORDER — OXYCODONE AND ACETAMINOPHEN 7.5; 325 MG/1; MG/1
1 TABLET ORAL EVERY 6 HOURS PRN
Qty: 120 TABLET | Refills: 0 | Status: SHIPPED | OUTPATIENT
Start: 2019-12-15 | End: 2020-01-24

## 2019-11-05 ASSESSMENT — ENCOUNTER SYMPTOMS
NAUSEA: 0
DIARRHEA: 0
PHOTOPHOBIA: 0
COUGH: 0
CONSTIPATION: 1
COLOR CHANGE: 0
WHEEZING: 0
CHOKING: 0
BACK PAIN: 1
TROUBLE SWALLOWING: 0
VOICE CHANGE: 0
ABDOMINAL PAIN: 0
VOMITING: 0
CHEST TIGHTNESS: 0
SHORTNESS OF BREATH: 0

## 2019-11-08 LAB

## 2019-11-09 ENCOUNTER — APPOINTMENT (OUTPATIENT)
Dept: GENERAL RADIOLOGY | Age: 84
End: 2019-11-09
Payer: MEDICARE

## 2019-11-09 ENCOUNTER — HOSPITAL ENCOUNTER (EMERGENCY)
Age: 84
Discharge: HOME | End: 2019-11-09
Attending: EMERGENCY MEDICINE
Payer: MEDICARE

## 2019-11-09 ENCOUNTER — APPOINTMENT (OUTPATIENT)
Dept: CT IMAGING | Age: 84
End: 2019-11-09
Payer: MEDICARE

## 2019-11-09 VITALS
DIASTOLIC BLOOD PRESSURE: 78 MMHG | BODY MASS INDEX: 34.06 KG/M2 | SYSTOLIC BLOOD PRESSURE: 154 MMHG | TEMPERATURE: 98.3 F | HEIGHT: 63 IN | RESPIRATION RATE: 20 BRPM | WEIGHT: 192.24 LBS | OXYGEN SATURATION: 94 % | HEART RATE: 92 BPM

## 2019-11-09 DIAGNOSIS — S52.501A NONDISPLACED FRACTURE OF DISTAL END OF RIGHT RADIUS: ICD-10-CM

## 2019-11-09 DIAGNOSIS — S00.81XA FOREHEAD ABRASION, INITIAL ENCOUNTER: ICD-10-CM

## 2019-11-09 DIAGNOSIS — S00.31XA NASAL ABRASION, INITIAL ENCOUNTER: ICD-10-CM
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
## 2019-11-09 LAB
A/G RATIO: 1.7 (ref 1.1–2.2)
ALBUMIN SERPL-MCNC: 4.5 G/DL (ref 3.4–5)
ALP BLD-CCNC: 50 U/L (ref 40–129)
ALT SERPL-CCNC: 13 U/L (ref 10–40)
ANION GAP SERPL CALCULATED.3IONS-SCNC: 13 MMOL/L (ref 3–16)
AST SERPL-CCNC: 25 U/L (ref 15–37)
BASOPHILS ABSOLUTE: 0.1 K/UL (ref 0–0.2)
BASOPHILS RELATIVE PERCENT: 0.7 %
BILIRUB SERPL-MCNC: 0.5 MG/DL (ref 0–1)
BUN BLDV-MCNC: 15 MG/DL (ref 7–20)
CALCIUM SERPL-MCNC: 9.4 MG/DL (ref 8.3–10.6)
CHLORIDE BLD-SCNC: 98 MMOL/L (ref 99–110)
CO2: 27 MMOL/L (ref 21–32)
CREAT SERPL-MCNC: <0.5 MG/DL (ref 0.6–1.2)
EOSINOPHILS ABSOLUTE: 0.2 K/UL (ref 0–0.6)
EOSINOPHILS RELATIVE PERCENT: 2.7 %
GFR AFRICAN AMERICAN: >60
GFR NON-AFRICAN AMERICAN: >60
GLOBULIN: 2.7 G/DL
GLUCOSE BLD-MCNC: 141 MG/DL (ref 70–99)
HCT VFR BLD CALC: 40.5 % (ref 36–48)
HEMOGLOBIN: 13.8 G/DL (ref 12–16)
INR BLD: 2.07 (ref 0.86–1.14)
LYMPHOCYTES ABSOLUTE: 1.9 K/UL (ref 1–5.1)
LYMPHOCYTES RELATIVE PERCENT: 24 %
MCH RBC QN AUTO: 30.9 PG (ref 26–34)
MCHC RBC AUTO-ENTMCNC: 34 G/DL (ref 31–36)
MCV RBC AUTO: 90.8 FL (ref 80–100)
MONOCYTES ABSOLUTE: 0.6 K/UL (ref 0–1.3)
MONOCYTES RELATIVE PERCENT: 7.4 %
NEUTROPHILS ABSOLUTE: 5.1 K/UL (ref 1.7–7.7)
NEUTROPHILS RELATIVE PERCENT: 65.2 %
PDW BLD-RTO: 14 % (ref 12.4–15.4)
PLATELET # BLD: 185 K/UL (ref 135–450)
PMV BLD AUTO: 7.8 FL (ref 5–10.5)
POTASSIUM REFLEX MAGNESIUM: 4.8 MMOL/L (ref 3.5–5.1)
PROTHROMBIN TIME: 23.6 SEC (ref 9.8–13)
RBC # BLD: 4.46 M/UL (ref 4–5.2)
SODIUM BLD-SCNC: 138 MMOL/L (ref 136–145)
TOTAL PROTEIN: 7.2 G/DL (ref 6.4–8.2)
TROPONIN: <0.01 NG/ML
WBC # BLD: 7.8 K/UL (ref 4–11)

## 2019-11-09 PROCEDURE — 85025 COMPLETE CBC W/AUTO DIFF WBC: CPT

## 2019-11-09 PROCEDURE — 85610 PROTHROMBIN TIME: CPT

## 2019-11-09 PROCEDURE — 99285 EMERGENCY DEPT VISIT HI MDM: CPT

## 2019-11-09 PROCEDURE — 80053 COMPREHEN METABOLIC PANEL: CPT

## 2019-11-09 PROCEDURE — 84484 ASSAY OF TROPONIN QUANT: CPT

## 2019-11-09 PROCEDURE — 93005 ELECTROCARDIOGRAM TRACING: CPT | Performed by: EMERGENCY MEDICINE

## 2019-11-09 PROCEDURE — 4500000025 HC ED LEVEL 5 PROCEDURE

## 2019-11-09 PROCEDURE — 73030 X-RAY EXAM OF SHOULDER: CPT

## 2019-11-09 PROCEDURE — 36415 COLL VENOUS BLD VENIPUNCTURE: CPT

## 2019-11-09 PROCEDURE — 73110 X-RAY EXAM OF WRIST: CPT

## 2019-11-09 PROCEDURE — 70450 CT HEAD/BRAIN W/O DYE: CPT

## 2019-11-09 PROCEDURE — 73080 X-RAY EXAM OF ELBOW: CPT

## 2019-11-09 ASSESSMENT — PAIN DESCRIPTION - PROGRESSION
CLINICAL_PROGRESSION: NOT CHANGED
CLINICAL_PROGRESSION: GRADUALLY WORSENING
CLINICAL_PROGRESSION_2: GRADUALLY WORSENING

## 2019-11-09 ASSESSMENT — PAIN DESCRIPTION - ONSET
ONSET_2: SUDDEN
ONSET: ON-GOING
ONSET: SUDDEN

## 2019-11-09 ASSESSMENT — PAIN DESCRIPTION - DESCRIPTORS
DESCRIPTORS_2: BURNING
DESCRIPTORS: ACHING;THROBBING
DESCRIPTORS: THROBBING;ACHING

## 2019-11-09 ASSESSMENT — PAIN - FUNCTIONAL ASSESSMENT
PAIN_FUNCTIONAL_ASSESSMENT: PREVENTS OR INTERFERES SOME ACTIVE ACTIVITIES AND ADLS
PAIN_FUNCTIONAL_ASSESSMENT: PREVENTS OR INTERFERES SOME ACTIVE ACTIVITIES AND ADLS
PAIN_FUNCTIONAL_ASSESSMENT: 0-10

## 2019-11-09 ASSESSMENT — PAIN DESCRIPTION - FREQUENCY
FREQUENCY: CONTINUOUS
FREQUENCY: CONTINUOUS

## 2019-11-09 ASSESSMENT — PAIN DESCRIPTION - PAIN TYPE
TYPE: ACUTE PAIN
TYPE: ACUTE PAIN
TYPE_2: ACUTE PAIN

## 2019-11-09 ASSESSMENT — PAIN DESCRIPTION - LOCATION
LOCATION_2: FACE
LOCATION: ARM;WRIST
LOCATION: HAND

## 2019-11-09 ASSESSMENT — PAIN DESCRIPTION - ORIENTATION
ORIENTATION: RIGHT
ORIENTATION_2: ANTERIOR
ORIENTATION: RIGHT

## 2019-11-09 ASSESSMENT — PAIN DESCRIPTION - DURATION: DURATION_2: CONTINUOUS

## 2019-11-09 ASSESSMENT — PAIN SCALES - GENERAL
PAINLEVEL_OUTOF10: 7
PAINLEVEL_OUTOF10: 7

## 2019-11-09 ASSESSMENT — PAIN DESCRIPTION - INTENSITY: RATING_2: 8

## 2019-11-10 LAB
EKG ATRIAL RATE: 55 BPM
EKG DIAGNOSIS: NORMAL
EKG Q-T INTERVAL: 376 MS
EKG QRS DURATION: 90 MS
EKG QTC CALCULATION (BAZETT): 425 MS
EKG R AXIS: 55 DEGREES
EKG T AXIS: 52 DEGREES
EKG VENTRICULAR RATE: 77 BPM

## 2019-11-10 PROCEDURE — 93010 ELECTROCARDIOGRAM REPORT: CPT | Performed by: INTERNAL MEDICINE

## 2019-11-11 ENCOUNTER — TELEPHONE (OUTPATIENT)
Dept: ORTHOPEDIC SURGERY | Age: 84
End: 2019-11-11

## 2019-11-15 ENCOUNTER — ANTI-COAG VISIT (OUTPATIENT)
Dept: PHARMACY | Age: 84
End: 2019-11-15
Payer: MEDICARE

## 2019-11-15 ENCOUNTER — OFFICE VISIT (OUTPATIENT)
Dept: ORTHOPEDIC SURGERY | Age: 84
End: 2019-11-15
Payer: MEDICARE

## 2019-11-15 VITALS
RESPIRATION RATE: 16 BRPM | BODY MASS INDEX: 34.02 KG/M2 | HEIGHT: 63 IN | DIASTOLIC BLOOD PRESSURE: 82 MMHG | WEIGHT: 192 LBS | HEART RATE: 72 BPM | SYSTOLIC BLOOD PRESSURE: 138 MMHG

## 2019-11-15 DIAGNOSIS — I48.91 ATRIAL FIBRILLATION WITH RAPID VENTRICULAR RESPONSE (HCC): ICD-10-CM

## 2019-11-15 DIAGNOSIS — S52.571A OTHER CLOSED INTRA-ARTICULAR FRACTURE OF DISTAL END OF RIGHT RADIUS, INITIAL ENCOUNTER: Primary | ICD-10-CM

## 2019-11-15 PROBLEM — S52.501A CLOSED FRACTURE OF RIGHT DISTAL RADIUS: Status: ACTIVE | Noted: 2019-11-15

## 2019-11-15 LAB — INTERNATIONAL NORMALIZATION RATIO, POC: 2.7

## 2019-11-15 PROCEDURE — 1123F ACP DISCUSS/DSCN MKR DOCD: CPT | Performed by: ORTHOPAEDIC SURGERY

## 2019-11-15 PROCEDURE — 4040F PNEUMOC VAC/ADMIN/RCVD: CPT | Performed by: ORTHOPAEDIC SURGERY

## 2019-11-15 PROCEDURE — L3908 WHO COCK-UP NONMOLDE PRE OTS: HCPCS | Performed by: ORTHOPAEDIC SURGERY

## 2019-11-15 PROCEDURE — G8598 ASA/ANTIPLAT THER USED: HCPCS | Performed by: ORTHOPAEDIC SURGERY

## 2019-11-15 PROCEDURE — 99214 OFFICE O/P EST MOD 30 MIN: CPT | Performed by: ORTHOPAEDIC SURGERY

## 2019-11-15 PROCEDURE — 1036F TOBACCO NON-USER: CPT | Performed by: ORTHOPAEDIC SURGERY

## 2019-11-15 PROCEDURE — 1090F PRES/ABSN URINE INCON ASSESS: CPT | Performed by: ORTHOPAEDIC SURGERY

## 2019-11-15 PROCEDURE — 85610 PROTHROMBIN TIME: CPT

## 2019-11-15 PROCEDURE — G8428 CUR MEDS NOT DOCUMENT: HCPCS | Performed by: ORTHOPAEDIC SURGERY

## 2019-11-15 PROCEDURE — 25600 CLTX DST RDL FX/EPHYS SEP WO: CPT | Performed by: ORTHOPAEDIC SURGERY

## 2019-11-15 PROCEDURE — 99211 OFF/OP EST MAY X REQ PHY/QHP: CPT

## 2019-11-15 PROCEDURE — G8417 CALC BMI ABV UP PARAM F/U: HCPCS | Performed by: ORTHOPAEDIC SURGERY

## 2019-11-15 PROCEDURE — G8400 PT W/DXA NO RESULTS DOC: HCPCS | Performed by: ORTHOPAEDIC SURGERY

## 2019-11-15 PROCEDURE — L3660 SO 8 AB RSTR CAN/WEB PRE OTS: HCPCS | Performed by: ORTHOPAEDIC SURGERY

## 2019-11-15 PROCEDURE — G8482 FLU IMMUNIZE ORDER/ADMIN: HCPCS | Performed by: ORTHOPAEDIC SURGERY

## 2019-11-15 RX ORDER — PROMETHAZINE HYDROCHLORIDE 25 MG/1
25 TABLET ORAL 2 TIMES DAILY
Qty: 10 TABLET | Refills: 0 | Status: SHIPPED | OUTPATIENT
Start: 2019-11-15 | End: 2019-11-20

## 2019-11-25 ENCOUNTER — OFFICE VISIT (OUTPATIENT)
Dept: PAIN MANAGEMENT | Age: 84
End: 2019-11-25
Payer: MEDICARE

## 2019-11-25 VITALS
SYSTOLIC BLOOD PRESSURE: 159 MMHG | HEART RATE: 89 BPM | DIASTOLIC BLOOD PRESSURE: 98 MMHG | BODY MASS INDEX: 34.34 KG/M2 | HEIGHT: 63 IN | WEIGHT: 193.8 LBS

## 2019-11-25 DIAGNOSIS — M47.892 OTHER SPONDYLOSIS, CERVICAL REGION: ICD-10-CM

## 2019-11-25 DIAGNOSIS — G89.4 CHRONIC PAIN SYNDROME: Primary | ICD-10-CM

## 2019-11-25 DIAGNOSIS — F43.23 ADJUSTMENT DISORDER WITH MIXED ANXIETY AND DEPRESSED MOOD: ICD-10-CM

## 2019-11-25 DIAGNOSIS — M47.896 OTHER SPONDYLOSIS, LUMBAR REGION: ICD-10-CM

## 2019-11-25 DIAGNOSIS — G89.4 CHRONIC PAIN SYNDROME: ICD-10-CM

## 2019-11-25 DIAGNOSIS — R54 ADVANCED AGE: ICD-10-CM

## 2019-11-25 DIAGNOSIS — F11.90 CHRONIC, CONTINUOUS USE OF OPIOIDS: ICD-10-CM

## 2019-11-25 DIAGNOSIS — M13.0 POLYARTHROPATHY, MULTIPLE SITES: Primary | ICD-10-CM

## 2019-11-25 DIAGNOSIS — M41.25 OTHER IDIOPATHIC SCOLIOSIS, THORACOLUMBAR REGION: ICD-10-CM

## 2019-11-25 PROCEDURE — 4040F PNEUMOC VAC/ADMIN/RCVD: CPT | Performed by: ANESTHESIOLOGY

## 2019-11-25 PROCEDURE — G8400 PT W/DXA NO RESULTS DOC: HCPCS | Performed by: ANESTHESIOLOGY

## 2019-11-25 PROCEDURE — 1036F TOBACCO NON-USER: CPT | Performed by: PSYCHOLOGIST

## 2019-11-25 PROCEDURE — 99204 OFFICE O/P NEW MOD 45 MIN: CPT | Performed by: ANESTHESIOLOGY

## 2019-11-25 PROCEDURE — 1123F ACP DISCUSS/DSCN MKR DOCD: CPT | Performed by: ANESTHESIOLOGY

## 2019-11-25 PROCEDURE — G8482 FLU IMMUNIZE ORDER/ADMIN: HCPCS | Performed by: ANESTHESIOLOGY

## 2019-11-25 PROCEDURE — G8598 ASA/ANTIPLAT THER USED: HCPCS | Performed by: ANESTHESIOLOGY

## 2019-11-25 PROCEDURE — G8427 DOCREV CUR MEDS BY ELIG CLIN: HCPCS | Performed by: ANESTHESIOLOGY

## 2019-11-25 PROCEDURE — G0444 DEPRESSION SCREEN ANNUAL: HCPCS | Performed by: ANESTHESIOLOGY

## 2019-11-25 PROCEDURE — 90791 PSYCH DIAGNOSTIC EVALUATION: CPT | Performed by: PSYCHOLOGIST

## 2019-11-25 PROCEDURE — 1036F TOBACCO NON-USER: CPT | Performed by: ANESTHESIOLOGY

## 2019-11-25 PROCEDURE — 1090F PRES/ABSN URINE INCON ASSESS: CPT | Performed by: ANESTHESIOLOGY

## 2019-11-25 PROCEDURE — G8417 CALC BMI ABV UP PARAM F/U: HCPCS | Performed by: ANESTHESIOLOGY

## 2019-11-25 ASSESSMENT — ENCOUNTER SYMPTOMS
NAUSEA: 0
COUGH: 0
EYE DISCHARGE: 0
ABDOMINAL PAIN: 0
EYE PAIN: 0
SHORTNESS OF BREATH: 0
EYE REDNESS: 0
VOMITING: 0
WHEEZING: 0
BACK PAIN: 1
CONSTIPATION: 0

## 2019-11-25 ASSESSMENT — ANXIETY QUESTIONNAIRES
3. WORRYING TOO MUCH ABOUT DIFFERENT THINGS: 3-NEARLY EVERY DAY
4. TROUBLE RELAXING: 3-NEARLY EVERY DAY
1. FEELING NERVOUS, ANXIOUS, OR ON EDGE: 3-NEARLY EVERY DAY
6. BECOMING EASILY ANNOYED OR IRRITABLE: 3-NEARLY EVERY DAY
GAD7 TOTAL SCORE: 20
7. FEELING AFRAID AS IF SOMETHING AWFUL MIGHT HAPPEN: 3-NEARLY EVERY DAY
5. BEING SO RESTLESS THAT IT IS HARD TO SIT STILL: 2-OVER HALF THE DAYS
2. NOT BEING ABLE TO STOP OR CONTROL WORRYING: 3-NEARLY EVERY DAY

## 2019-11-25 ASSESSMENT — PATIENT HEALTH QUESTIONNAIRE - PHQ9
4. FEELING TIRED OR HAVING LITTLE ENERGY: 3
5. POOR APPETITE OR OVEREATING: 3
SUM OF ALL RESPONSES TO PHQ QUESTIONS 1-9: 23
SUM OF ALL RESPONSES TO PHQ QUESTIONS 1-9: 23
8. MOVING OR SPEAKING SO SLOWLY THAT OTHER PEOPLE COULD HAVE NOTICED. OR THE OPPOSITE, BEING SO FIGETY OR RESTLESS THAT YOU HAVE BEEN MOVING AROUND A LOT MORE THAN USUAL: 0
6. FEELING BAD ABOUT YOURSELF - OR THAT YOU ARE A FAILURE OR HAVE LET YOURSELF OR YOUR FAMILY DOWN: 3
2. FEELING DOWN, DEPRESSED OR HOPELESS: 3
10. IF YOU CHECKED OFF ANY PROBLEMS, HOW DIFFICULT HAVE THESE PROBLEMS MADE IT FOR YOU TO DO YOUR WORK, TAKE CARE OF THINGS AT HOME, OR GET ALONG WITH OTHER PEOPLE: 3
SUM OF ALL RESPONSES TO PHQ9 QUESTIONS 1 & 2: 6
9. THOUGHTS THAT YOU WOULD BE BETTER OFF DEAD, OR OF HURTING YOURSELF: 3
7. TROUBLE CONCENTRATING ON THINGS, SUCH AS READING THE NEWSPAPER OR WATCHING TELEVISION: 2
3. TROUBLE FALLING OR STAYING ASLEEP: 3
1. LITTLE INTEREST OR PLEASURE IN DOING THINGS: 3

## 2019-12-18 ENCOUNTER — TELEPHONE (OUTPATIENT)
Dept: PRIMARY CARE CLINIC | Age: 84
End: 2019-12-18

## 2019-12-19 DIAGNOSIS — K21.9 GASTROESOPHAGEAL REFLUX DISEASE, ESOPHAGITIS PRESENCE NOT SPECIFIED: Primary | ICD-10-CM

## 2019-12-19 RX ORDER — PANTOPRAZOLE SODIUM 40 MG/1
40 TABLET, DELAYED RELEASE ORAL DAILY
Qty: 30 TABLET | Refills: 0 | Status: SHIPPED | OUTPATIENT
Start: 2019-12-19 | End: 2020-02-11 | Stop reason: SDUPTHER

## 2020-01-01 ENCOUNTER — OFFICE VISIT (OUTPATIENT)
Dept: INTERNAL MEDICINE CLINIC | Age: 85
End: 2020-01-01
Payer: MEDICARE

## 2020-01-01 ENCOUNTER — OFFICE VISIT (OUTPATIENT)
Dept: ORTHOPEDIC SURGERY | Age: 85
End: 2020-01-01
Payer: MEDICARE

## 2020-01-01 ENCOUNTER — ANTI-COAG VISIT (OUTPATIENT)
Dept: PHARMACY | Age: 85
End: 2020-01-01

## 2020-01-01 ENCOUNTER — OFFICE VISIT (OUTPATIENT)
Dept: PAIN MANAGEMENT | Age: 85
End: 2020-01-01
Payer: MEDICARE

## 2020-01-01 ENCOUNTER — VIRTUAL VISIT (OUTPATIENT)
Dept: PAIN MANAGEMENT | Age: 85
End: 2020-01-01
Payer: MEDICARE

## 2020-01-01 ENCOUNTER — TELEPHONE (OUTPATIENT)
Dept: PHARMACY | Age: 85
End: 2020-01-01

## 2020-01-01 ENCOUNTER — HOSPITAL ENCOUNTER (OUTPATIENT)
Dept: GENERAL RADIOLOGY | Age: 85
Discharge: HOME | End: 2020-06-03
Payer: MEDICARE

## 2020-01-01 ENCOUNTER — TELEPHONE (OUTPATIENT)
Dept: INTERNAL MEDICINE CLINIC | Age: 85
End: 2020-01-01

## 2020-01-01 ENCOUNTER — TELEPHONE (OUTPATIENT)
Dept: OTHER | Facility: CLINIC | Age: 85
End: 2020-01-01

## 2020-01-01 ENCOUNTER — HOSPITAL ENCOUNTER (EMERGENCY)
Age: 85
Discharge: HOME | End: 2020-03-09
Attending: EMERGENCY MEDICINE
Payer: MEDICARE

## 2020-01-01 ENCOUNTER — HOSPITAL ENCOUNTER (OUTPATIENT)
Age: 85
Discharge: HOME | End: 2020-06-03
Payer: MEDICARE

## 2020-01-01 ENCOUNTER — OFFICE VISIT (OUTPATIENT)
Dept: CARDIOLOGY CLINIC | Age: 85
End: 2020-01-01
Payer: MEDICARE

## 2020-01-01 ENCOUNTER — APPOINTMENT (OUTPATIENT)
Dept: GENERAL RADIOLOGY | Age: 85
End: 2020-01-01
Payer: MEDICARE

## 2020-01-01 ENCOUNTER — TELEPHONE (OUTPATIENT)
Dept: PAIN MANAGEMENT | Age: 85
End: 2020-01-01

## 2020-01-01 ENCOUNTER — ANTI-COAG VISIT (OUTPATIENT)
Dept: PHARMACY | Age: 85
End: 2020-01-01
Payer: MEDICARE

## 2020-01-01 ENCOUNTER — HOSPITAL ENCOUNTER (EMERGENCY)
Age: 85
Discharge: HOME | End: 2020-12-22
Attending: EMERGENCY MEDICINE
Payer: MEDICARE

## 2020-01-01 ENCOUNTER — VIRTUAL VISIT (OUTPATIENT)
Dept: FAMILY MEDICINE CLINIC | Age: 85
End: 2020-01-01
Payer: MEDICARE

## 2020-01-01 ENCOUNTER — APPOINTMENT (OUTPATIENT)
Dept: CT IMAGING | Age: 85
End: 2020-01-01
Payer: MEDICARE

## 2020-01-01 ENCOUNTER — TELEPHONE (OUTPATIENT)
Dept: FAMILY MEDICINE CLINIC | Age: 85
End: 2020-01-01

## 2020-01-01 ENCOUNTER — HOSPITAL ENCOUNTER (OUTPATIENT)
Dept: NON INVASIVE DIAGNOSTICS | Age: 85
Discharge: HOME | End: 2020-06-15
Payer: MEDICARE

## 2020-01-01 VITALS — HEIGHT: 63 IN | BODY MASS INDEX: 34.9 KG/M2 | RESPIRATION RATE: 16 BRPM | TEMPERATURE: 98.4 F

## 2020-01-01 VITALS
DIASTOLIC BLOOD PRESSURE: 76 MMHG | HEART RATE: 80 BPM | WEIGHT: 198 LBS | SYSTOLIC BLOOD PRESSURE: 138 MMHG | BODY MASS INDEX: 35.08 KG/M2 | TEMPERATURE: 97.9 F | OXYGEN SATURATION: 94 % | HEIGHT: 63 IN

## 2020-01-01 VITALS
SYSTOLIC BLOOD PRESSURE: 154 MMHG | TEMPERATURE: 97.6 F | DIASTOLIC BLOOD PRESSURE: 83 MMHG | OXYGEN SATURATION: 94 % | HEART RATE: 92 BPM

## 2020-01-01 VITALS
HEIGHT: 60 IN | HEART RATE: 95 BPM | TEMPERATURE: 97.9 F | BODY MASS INDEX: 37.69 KG/M2 | RESPIRATION RATE: 16 BRPM | WEIGHT: 192 LBS | DIASTOLIC BLOOD PRESSURE: 74 MMHG | OXYGEN SATURATION: 95 % | SYSTOLIC BLOOD PRESSURE: 156 MMHG

## 2020-01-01 VITALS
TEMPERATURE: 98.1 F | HEIGHT: 63 IN | SYSTOLIC BLOOD PRESSURE: 152 MMHG | HEART RATE: 88 BPM | OXYGEN SATURATION: 95 % | WEIGHT: 197 LBS | DIASTOLIC BLOOD PRESSURE: 78 MMHG | BODY MASS INDEX: 34.91 KG/M2

## 2020-01-01 VITALS
WEIGHT: 192 LBS | DIASTOLIC BLOOD PRESSURE: 68 MMHG | BODY MASS INDEX: 38.71 KG/M2 | HEIGHT: 59 IN | SYSTOLIC BLOOD PRESSURE: 128 MMHG

## 2020-01-01 VITALS
OXYGEN SATURATION: 97 % | SYSTOLIC BLOOD PRESSURE: 179 MMHG | DIASTOLIC BLOOD PRESSURE: 98 MMHG | TEMPERATURE: 98.2 F | HEART RATE: 96 BPM | RESPIRATION RATE: 20 BRPM

## 2020-01-01 VITALS
BODY MASS INDEX: 34.96 KG/M2 | HEART RATE: 95 BPM | SYSTOLIC BLOOD PRESSURE: 193 MMHG | TEMPERATURE: 98.5 F | RESPIRATION RATE: 24 BRPM | OXYGEN SATURATION: 96 % | WEIGHT: 197.31 LBS | HEIGHT: 63 IN | DIASTOLIC BLOOD PRESSURE: 85 MMHG

## 2020-01-01 VITALS
OXYGEN SATURATION: 97 % | TEMPERATURE: 97.3 F | HEART RATE: 88 BPM | DIASTOLIC BLOOD PRESSURE: 86 MMHG | SYSTOLIC BLOOD PRESSURE: 130 MMHG

## 2020-01-01 VITALS — TEMPERATURE: 97.6 F

## 2020-01-01 VITALS — TEMPERATURE: 98.5 F

## 2020-01-01 DIAGNOSIS — G62.9 NEUROPATHY: ICD-10-CM

## 2020-01-01 DIAGNOSIS — E66.01 MORBID OBESITY DUE TO EXCESS CALORIES (HCC): ICD-10-CM

## 2020-01-01 DIAGNOSIS — F43.23 ADJUSTMENT DISORDER WITH MIXED ANXIETY AND DEPRESSED MOOD: ICD-10-CM

## 2020-01-01 DIAGNOSIS — M41.25 OTHER IDIOPATHIC SCOLIOSIS, THORACOLUMBAR REGION: ICD-10-CM

## 2020-01-01 DIAGNOSIS — Z02.89 PAIN MEDICATION AGREEMENT: ICD-10-CM

## 2020-01-01 DIAGNOSIS — M13.0 POLYARTHROPATHY, MULTIPLE SITES: ICD-10-CM

## 2020-01-01 DIAGNOSIS — M54.16 LUMBAR RADICULOPATHY: ICD-10-CM

## 2020-01-01 DIAGNOSIS — I50.33 ACUTE ON CHRONIC DIASTOLIC CONGESTIVE HEART FAILURE (HCC): ICD-10-CM

## 2020-01-01 DIAGNOSIS — R35.0 URINARY FREQUENCY: ICD-10-CM

## 2020-01-01 DIAGNOSIS — G89.4 CHRONIC PAIN SYNDROME: ICD-10-CM

## 2020-01-01 DIAGNOSIS — R11.0 NAUSEA WITHOUT VOMITING: ICD-10-CM

## 2020-01-01 DIAGNOSIS — M79.7 FIBROMYALGIA: ICD-10-CM

## 2020-01-01 DIAGNOSIS — F51.01 PRIMARY INSOMNIA: ICD-10-CM

## 2020-01-01 DIAGNOSIS — M47.892 OTHER SPONDYLOSIS, CERVICAL REGION: ICD-10-CM

## 2020-01-01 DIAGNOSIS — T40.2X5A THERAPEUTIC OPIOID INDUCED CONSTIPATION: ICD-10-CM

## 2020-01-01 DIAGNOSIS — I48.91 ATRIAL FIBRILLATION WITH RAPID VENTRICULAR RESPONSE (HCC): ICD-10-CM

## 2020-01-01 DIAGNOSIS — M47.26 OTHER SPONDYLOSIS WITH RADICULOPATHY, LUMBAR REGION: ICD-10-CM

## 2020-01-01 DIAGNOSIS — K59.03 THERAPEUTIC OPIOID INDUCED CONSTIPATION: ICD-10-CM

## 2020-01-01 DIAGNOSIS — M16.0 PRIMARY OSTEOARTHRITIS OF BOTH HIPS: ICD-10-CM

## 2020-01-01 DIAGNOSIS — R54 ADVANCED AGE: ICD-10-CM

## 2020-01-01 DIAGNOSIS — J44.9 CHRONIC OBSTRUCTIVE PULMONARY DISEASE, UNSPECIFIED COPD TYPE (HCC): ICD-10-CM

## 2020-01-01 LAB
A/G RATIO: 1.2 (ref 1.1–2.2)
A/G RATIO: 1.3 (ref 1.1–2.2)
ALBUMIN SERPL-MCNC: 4.1 G/DL (ref 3.4–5)
ALBUMIN SERPL-MCNC: 4.2 G/DL (ref 3.4–5)
ALP BLD-CCNC: 50 U/L (ref 40–129)
ALP BLD-CCNC: 60 U/L (ref 40–129)
ALT SERPL-CCNC: 12 U/L (ref 10–40)
ALT SERPL-CCNC: 15 U/L (ref 10–40)
ANION GAP SERPL CALCULATED.3IONS-SCNC: 11 MMOL/L (ref 3–16)
ANION GAP SERPL CALCULATED.3IONS-SCNC: 13 MMOL/L (ref 3–16)
ANION GAP SERPL CALCULATED.3IONS-SCNC: 15 MMOL/L (ref 3–16)
AST SERPL-CCNC: 17 U/L (ref 15–37)
AST SERPL-CCNC: 20 U/L (ref 15–37)
BACTERIA: ABNORMAL /HPF
BACTERIA: ABNORMAL /HPF
BASE EXCESS VENOUS: 4.7 MMOL/L (ref -3–3)
BASOPHILS ABSOLUTE: 0 K/UL (ref 0–0.2)
BASOPHILS ABSOLUTE: 0.1 K/UL (ref 0–0.2)
BASOPHILS ABSOLUTE: 0.1 K/UL (ref 0–0.2)
BASOPHILS RELATIVE PERCENT: 0.5 %
BASOPHILS RELATIVE PERCENT: 0.6 %
BASOPHILS RELATIVE PERCENT: 1 %
BILIRUB SERPL-MCNC: 0.7 MG/DL (ref 0–1)
BILIRUB SERPL-MCNC: 1 MG/DL (ref 0–1)
BILIRUBIN URINE: NEGATIVE
BILIRUBIN URINE: NEGATIVE
BILIRUBIN, POC: NORMAL
BLOOD URINE, POC: NORMAL
BLOOD, URINE: ABNORMAL
BLOOD, URINE: NEGATIVE
BUN BLDV-MCNC: 11 MG/DL (ref 7–20)
BUN BLDV-MCNC: 13 MG/DL (ref 7–20)
BUN BLDV-MCNC: 17 MG/DL (ref 7–20)
CALCIUM SERPL-MCNC: 9.4 MG/DL (ref 8.3–10.6)
CALCIUM SERPL-MCNC: 9.6 MG/DL (ref 8.3–10.6)
CALCIUM SERPL-MCNC: 9.7 MG/DL (ref 8.3–10.6)
CHLORIDE BLD-SCNC: 101 MMOL/L (ref 99–110)
CHLORIDE BLD-SCNC: 97 MMOL/L (ref 99–110)
CHLORIDE BLD-SCNC: 97 MMOL/L (ref 99–110)
CLARITY, POC: NORMAL
CLARITY: ABNORMAL
CLARITY: ABNORMAL
CO2: 25 MMOL/L (ref 21–32)
CO2: 26 MMOL/L (ref 21–32)
CO2: 28 MMOL/L (ref 21–32)
COLOR, POC: NORMAL
COLOR: YELLOW
COLOR: YELLOW
CREAT SERPL-MCNC: 0.5 MG/DL (ref 0.6–1.2)
CREAT SERPL-MCNC: 0.7 MG/DL (ref 0.6–1.2)
CREAT SERPL-MCNC: <0.5 MG/DL (ref 0.6–1.2)
EKG ATRIAL RATE: 104 BPM
EKG ATRIAL RATE: 81 BPM
EKG DIAGNOSIS: NORMAL
EKG DIAGNOSIS: NORMAL
EKG Q-T INTERVAL: 338 MS
EKG Q-T INTERVAL: 350 MS
EKG QRS DURATION: 88 MS
EKG QRS DURATION: 96 MS
EKG QTC CALCULATION (BAZETT): 429 MS
EKG QTC CALCULATION (BAZETT): 432 MS
EKG R AXIS: 30 DEGREES
EKG R AXIS: 36 DEGREES
EKG T AXIS: 36 DEGREES
EKG T AXIS: 51 DEGREES
EKG VENTRICULAR RATE: 92 BPM
EKG VENTRICULAR RATE: 97 BPM
EOSINOPHILS ABSOLUTE: 0.1 K/UL (ref 0–0.6)
EOSINOPHILS ABSOLUTE: 0.1 K/UL (ref 0–0.6)
EOSINOPHILS ABSOLUTE: 0.2 K/UL (ref 0–0.6)
EOSINOPHILS RELATIVE PERCENT: 1.3 %
EOSINOPHILS RELATIVE PERCENT: 1.6 %
EOSINOPHILS RELATIVE PERCENT: 1.6 %
EPITHELIAL CELLS, UA: 9 /HPF (ref 0–5)
EPITHELIAL CELLS, UA: ABNORMAL /HPF (ref 0–5)
GFR AFRICAN AMERICAN: >60
GFR NON-AFRICAN AMERICAN: >60
GLOBULIN: 3.2 G/DL
GLOBULIN: 3.6 G/DL
GLUCOSE BLD-MCNC: 125 MG/DL (ref 70–99)
GLUCOSE BLD-MCNC: 148 MG/DL (ref 70–99)
GLUCOSE BLD-MCNC: 209 MG/DL (ref 70–99)
GLUCOSE URINE, POC: NORMAL
GLUCOSE URINE: NEGATIVE MG/DL
GLUCOSE URINE: NEGATIVE MG/DL
HBA1C MFR BLD: 6.3 %
HCO3 VENOUS: 29.2 MMOL/L (ref 23–29)
HCT VFR BLD CALC: 42.4 % (ref 36–48)
HCT VFR BLD CALC: 42.7 % (ref 36–48)
HCT VFR BLD CALC: 42.8 % (ref 36–48)
HEMOGLOBIN: 14.2 G/DL (ref 12–16)
HEMOGLOBIN: 14.3 G/DL (ref 12–16)
HEMOGLOBIN: 14.4 G/DL (ref 12–16)
HYALINE CASTS: 5 /LPF (ref 0–8)
INR BLD: 1.42 (ref 0.86–1.14)
INTERNATIONAL NORMALIZATION RATIO, POC: 1.7
INTERNATIONAL NORMALIZATION RATIO, POC: 2
INTERNATIONAL NORMALIZATION RATIO, POC: 2.8
INTERNATIONAL NORMALIZATION RATIO, POC: 2.9
KETONES, POC: NORMAL
KETONES, URINE: NEGATIVE MG/DL
KETONES, URINE: NEGATIVE MG/DL
LACTIC ACID: 1.5 MMOL/L (ref 0.4–2)
LEUKOCYTE EST, POC: NORMAL
LEUKOCYTE ESTERASE, URINE: NEGATIVE
LEUKOCYTE ESTERASE, URINE: NEGATIVE
LIPASE: 13 U/L (ref 13–60)
LV EF: 58 %
LVEF MODALITY: NORMAL
LYMPHOCYTES ABSOLUTE: 1.2 K/UL (ref 1–5.1)
LYMPHOCYTES ABSOLUTE: 1.9 K/UL (ref 1–5.1)
LYMPHOCYTES ABSOLUTE: 2.4 K/UL (ref 1–5.1)
LYMPHOCYTES RELATIVE PERCENT: 11.6 %
LYMPHOCYTES RELATIVE PERCENT: 22.2 %
LYMPHOCYTES RELATIVE PERCENT: 27.9 %
MCH RBC QN AUTO: 30.1 PG (ref 26–34)
MCH RBC QN AUTO: 30.4 PG (ref 26–34)
MCH RBC QN AUTO: 30.7 PG (ref 26–34)
MCHC RBC AUTO-ENTMCNC: 33.2 G/DL (ref 31–36)
MCHC RBC AUTO-ENTMCNC: 33.7 G/DL (ref 31–36)
MCHC RBC AUTO-ENTMCNC: 33.8 G/DL (ref 31–36)
MCV RBC AUTO: 89.8 FL (ref 80–100)
MCV RBC AUTO: 90.8 FL (ref 80–100)
MCV RBC AUTO: 91.1 FL (ref 80–100)
MICROSCOPIC EXAMINATION: YES
MICROSCOPIC EXAMINATION: YES
MONOCYTES ABSOLUTE: 0.6 K/UL (ref 0–1.3)
MONOCYTES ABSOLUTE: 0.7 K/UL (ref 0–1.3)
MONOCYTES ABSOLUTE: 0.7 K/UL (ref 0–1.3)
MONOCYTES RELATIVE PERCENT: 5.8 %
MONOCYTES RELATIVE PERCENT: 7.8 %
MONOCYTES RELATIVE PERCENT: 7.9 %
NEUTROPHILS ABSOLUTE: 5.2 K/UL (ref 1.7–7.7)
NEUTROPHILS ABSOLUTE: 5.6 K/UL (ref 1.7–7.7)
NEUTROPHILS ABSOLUTE: 8 K/UL (ref 1.7–7.7)
NEUTROPHILS RELATIVE PERCENT: 62.2 %
NEUTROPHILS RELATIVE PERCENT: 67.6 %
NEUTROPHILS RELATIVE PERCENT: 80.4 %
NITRITE, POC: NORMAL
NITRITE, URINE: NEGATIVE
NITRITE, URINE: NEGATIVE
O2 SAT, VEN: 77 %
O2 THERAPY: ABNORMAL
PCO2, VEN: 45.2 MMHG (ref 40–50)
PDW BLD-RTO: 13.6 % (ref 12.4–15.4)
PDW BLD-RTO: 13.7 % (ref 12.4–15.4)
PDW BLD-RTO: 14.6 % (ref 12.4–15.4)
PH UA: 6.5 (ref 5–8)
PH UA: 7 (ref 5–8)
PH VENOUS: 7.42 (ref 7.35–7.45)
PH, POC: 7
PLATELET # BLD: 210 K/UL (ref 135–450)
PLATELET # BLD: 223 K/UL (ref 135–450)
PLATELET # BLD: 263 K/UL (ref 135–450)
PMV BLD AUTO: 7.6 FL (ref 5–10.5)
PMV BLD AUTO: 8.1 FL (ref 5–10.5)
PMV BLD AUTO: 8.4 FL (ref 5–10.5)
PO2, VEN: 41.3 MMHG (ref 25–40)
POTASSIUM REFLEX MAGNESIUM: 4.1 MMOL/L (ref 3.5–5.1)
POTASSIUM REFLEX MAGNESIUM: 4.2 MMOL/L (ref 3.5–5.1)
POTASSIUM SERPL-SCNC: 4.5 MMOL/L (ref 3.5–5.1)
PRO-BNP: 269 PG/ML (ref 0–449)
PRO-BNP: 329 PG/ML (ref 0–449)
PROTEIN UA: NEGATIVE MG/DL
PROTEIN UA: NEGATIVE MG/DL
PROTEIN, POC: NORMAL
PROTHROMBIN TIME: 16.5 SEC (ref 10–13.2)
RAPID INFLUENZA  B AGN: NEGATIVE
RAPID INFLUENZA A AGN: NEGATIVE
RBC # BLD: 4.65 M/UL (ref 4–5.2)
RBC # BLD: 4.71 M/UL (ref 4–5.2)
RBC # BLD: 4.75 M/UL (ref 4–5.2)
RBC UA: 1 /HPF (ref 0–4)
RBC UA: ABNORMAL /HPF (ref 0–4)
RENAL EPITHELIAL, UA: ABNORMAL /HPF (ref 0–1)
SODIUM BLD-SCNC: 136 MMOL/L (ref 136–145)
SODIUM BLD-SCNC: 138 MMOL/L (ref 136–145)
SODIUM BLD-SCNC: 139 MMOL/L (ref 136–145)
SPECIFIC GRAVITY UA: 1.01 (ref 1–1.03)
SPECIFIC GRAVITY UA: 1.02 (ref 1–1.03)
SPECIFIC GRAVITY, POC: 1.01
TCO2 CALC VENOUS: 29 MMOL/L
TOTAL PROTEIN: 7.3 G/DL (ref 6.4–8.2)
TOTAL PROTEIN: 7.8 G/DL (ref 6.4–8.2)
TROPONIN: <0.01 NG/ML
TROPONIN: <0.01 NG/ML
URINE CULTURE, ROUTINE: NORMAL
URINE REFLEX TO CULTURE: ABNORMAL
URINE TYPE: ABNORMAL
URINE TYPE: ABNORMAL
UROBILINOGEN, POC: NORMAL
UROBILINOGEN, URINE: 0.2 E.U./DL
UROBILINOGEN, URINE: 1 E.U./DL
WBC # BLD: 10.1 K/UL (ref 4–11)
WBC # BLD: 8.4 K/UL (ref 4–11)
WBC # BLD: 8.4 K/UL (ref 4–11)
WBC UA: 6 /HPF (ref 0–5)
WBC UA: ABNORMAL /HPF (ref 0–5)

## 2020-01-01 PROCEDURE — 83880 ASSAY OF NATRIURETIC PEPTIDE: CPT

## 2020-01-01 PROCEDURE — G8417 CALC BMI ABV UP PARAM F/U: HCPCS | Performed by: NURSE PRACTITIONER

## 2020-01-01 PROCEDURE — G8400 PT W/DXA NO RESULTS DOC: HCPCS | Performed by: NURSE PRACTITIONER

## 2020-01-01 PROCEDURE — G8417 CALC BMI ABV UP PARAM F/U: HCPCS | Performed by: INTERNAL MEDICINE

## 2020-01-01 PROCEDURE — 99214 OFFICE O/P EST MOD 30 MIN: CPT | Performed by: INTERNAL MEDICINE

## 2020-01-01 PROCEDURE — 1123F ACP DISCUSS/DSCN MKR DOCD: CPT | Performed by: NURSE PRACTITIONER

## 2020-01-01 PROCEDURE — 6360000002 HC RX W HCPCS: Performed by: EMERGENCY MEDICINE

## 2020-01-01 PROCEDURE — 3023F SPIROM DOC REV: CPT | Performed by: NURSE PRACTITIONER

## 2020-01-01 PROCEDURE — 1036F TOBACCO NON-USER: CPT | Performed by: NURSE PRACTITIONER

## 2020-01-01 PROCEDURE — 1090F PRES/ABSN URINE INCON ASSESS: CPT | Performed by: NURSE PRACTITIONER

## 2020-01-01 PROCEDURE — 4040F PNEUMOC VAC/ADMIN/RCVD: CPT | Performed by: INTERNAL MEDICINE

## 2020-01-01 PROCEDURE — 93000 ELECTROCARDIOGRAM COMPLETE: CPT | Performed by: INTERNAL MEDICINE

## 2020-01-01 PROCEDURE — 99211 OFF/OP EST MAY X REQ PHY/QHP: CPT

## 2020-01-01 PROCEDURE — 1123F ACP DISCUSS/DSCN MKR DOCD: CPT | Performed by: INTERNAL MEDICINE

## 2020-01-01 PROCEDURE — 96375 TX/PRO/DX INJ NEW DRUG ADDON: CPT

## 2020-01-01 PROCEDURE — G8400 PT W/DXA NO RESULTS DOC: HCPCS | Performed by: ANESTHESIOLOGY

## 2020-01-01 PROCEDURE — 99442 PR PHYS/QHP TELEPHONE EVALUATION 11-20 MIN: CPT | Performed by: ANESTHESIOLOGY

## 2020-01-01 PROCEDURE — 93306 TTE W/DOPPLER COMPLETE: CPT

## 2020-01-01 PROCEDURE — 1036F TOBACCO NON-USER: CPT | Performed by: INTERNAL MEDICINE

## 2020-01-01 PROCEDURE — 99213 OFFICE O/P EST LOW 20 MIN: CPT | Performed by: NURSE PRACTITIONER

## 2020-01-01 PROCEDURE — 6370000000 HC RX 637 (ALT 250 FOR IP): Performed by: EMERGENCY MEDICINE

## 2020-01-01 PROCEDURE — 36415 COLL VENOUS BLD VENIPUNCTURE: CPT

## 2020-01-01 PROCEDURE — 99204 OFFICE O/P NEW MOD 45 MIN: CPT | Performed by: INTERNAL MEDICINE

## 2020-01-01 PROCEDURE — G8427 DOCREV CUR MEDS BY ELIG CLIN: HCPCS | Performed by: INTERNAL MEDICINE

## 2020-01-01 PROCEDURE — G8926 SPIRO NO PERF OR DOC: HCPCS | Performed by: NURSE PRACTITIONER

## 2020-01-01 PROCEDURE — 99442 PR PHYS/QHP TELEPHONE EVALUATION 11-20 MIN: CPT | Performed by: INTERNAL MEDICINE

## 2020-01-01 PROCEDURE — G8427 DOCREV CUR MEDS BY ELIG CLIN: HCPCS | Performed by: NURSE PRACTITIONER

## 2020-01-01 PROCEDURE — 1090F PRES/ABSN URINE INCON ASSESS: CPT | Performed by: INTERNAL MEDICINE

## 2020-01-01 PROCEDURE — 85025 COMPLETE CBC W/AUTO DIFF WBC: CPT

## 2020-01-01 PROCEDURE — 96374 THER/PROPH/DIAG INJ IV PUSH: CPT

## 2020-01-01 PROCEDURE — 83036 HEMOGLOBIN GLYCOSYLATED A1C: CPT | Performed by: INTERNAL MEDICINE

## 2020-01-01 PROCEDURE — 1123F ACP DISCUSS/DSCN MKR DOCD: CPT | Performed by: ANESTHESIOLOGY

## 2020-01-01 PROCEDURE — 84484 ASSAY OF TROPONIN QUANT: CPT

## 2020-01-01 PROCEDURE — 85610 PROTHROMBIN TIME: CPT

## 2020-01-01 PROCEDURE — 80053 COMPREHEN METABOLIC PANEL: CPT

## 2020-01-01 PROCEDURE — 93005 ELECTROCARDIOGRAM TRACING: CPT

## 2020-01-01 PROCEDURE — G8417 CALC BMI ABV UP PARAM F/U: HCPCS | Performed by: ANESTHESIOLOGY

## 2020-01-01 PROCEDURE — 82803 BLOOD GASES ANY COMBINATION: CPT

## 2020-01-01 PROCEDURE — 73560 X-RAY EXAM OF KNEE 1 OR 2: CPT

## 2020-01-01 PROCEDURE — 1090F PRES/ABSN URINE INCON ASSESS: CPT | Performed by: ANESTHESIOLOGY

## 2020-01-01 PROCEDURE — 83605 ASSAY OF LACTIC ACID: CPT

## 2020-01-01 PROCEDURE — 93010 ELECTROCARDIOGRAM REPORT: CPT | Performed by: INTERNAL MEDICINE

## 2020-01-01 PROCEDURE — 4040F PNEUMOC VAC/ADMIN/RCVD: CPT | Performed by: NURSE PRACTITIONER

## 2020-01-01 PROCEDURE — 2580000003 HC RX 258: Performed by: EMERGENCY MEDICINE

## 2020-01-01 PROCEDURE — 20610 DRAIN/INJ JOINT/BURSA W/O US: CPT | Performed by: PHYSICIAN ASSISTANT

## 2020-01-01 PROCEDURE — 99441 PR PHYS/QHP TELEPHONE EVALUATION 5-10 MIN: CPT | Performed by: ANESTHESIOLOGY

## 2020-01-01 PROCEDURE — G8482 FLU IMMUNIZE ORDER/ADMIN: HCPCS | Performed by: INTERNAL MEDICINE

## 2020-01-01 PROCEDURE — G8400 PT W/DXA NO RESULTS DOC: HCPCS | Performed by: PHYSICIAN ASSISTANT

## 2020-01-01 PROCEDURE — 1036F TOBACCO NON-USER: CPT | Performed by: PHYSICIAN ASSISTANT

## 2020-01-01 PROCEDURE — G8400 PT W/DXA NO RESULTS DOC: HCPCS | Performed by: INTERNAL MEDICINE

## 2020-01-01 PROCEDURE — 93005 ELECTROCARDIOGRAM TRACING: CPT | Performed by: EMERGENCY MEDICINE

## 2020-01-01 PROCEDURE — 99214 OFFICE O/P EST MOD 30 MIN: CPT | Performed by: ANESTHESIOLOGY

## 2020-01-01 PROCEDURE — 99284 EMERGENCY DEPT VISIT MOD MDM: CPT

## 2020-01-01 PROCEDURE — 1123F ACP DISCUSS/DSCN MKR DOCD: CPT | Performed by: PHYSICIAN ASSISTANT

## 2020-01-01 PROCEDURE — 1036F TOBACCO NON-USER: CPT | Performed by: ANESTHESIOLOGY

## 2020-01-01 PROCEDURE — G8417 CALC BMI ABV UP PARAM F/U: HCPCS | Performed by: PHYSICIAN ASSISTANT

## 2020-01-01 PROCEDURE — 4040F PNEUMOC VAC/ADMIN/RCVD: CPT | Performed by: PHYSICIAN ASSISTANT

## 2020-01-01 PROCEDURE — 1090F PRES/ABSN URINE INCON ASSESS: CPT | Performed by: PHYSICIAN ASSISTANT

## 2020-01-01 PROCEDURE — 83690 ASSAY OF LIPASE: CPT

## 2020-01-01 PROCEDURE — 71046 X-RAY EXAM CHEST 2 VIEWS: CPT

## 2020-01-01 PROCEDURE — 99214 OFFICE O/P EST MOD 30 MIN: CPT | Performed by: PHYSICIAN ASSISTANT

## 2020-01-01 PROCEDURE — G8427 DOCREV CUR MEDS BY ELIG CLIN: HCPCS | Performed by: PHYSICIAN ASSISTANT

## 2020-01-01 PROCEDURE — 81002 URINALYSIS NONAUTO W/O SCOPE: CPT | Performed by: INTERNAL MEDICINE

## 2020-01-01 PROCEDURE — G8427 DOCREV CUR MEDS BY ELIG CLIN: HCPCS | Performed by: ANESTHESIOLOGY

## 2020-01-01 PROCEDURE — 74176 CT ABD & PELVIS W/O CONTRAST: CPT

## 2020-01-01 PROCEDURE — 81001 URINALYSIS AUTO W/SCOPE: CPT

## 2020-01-01 PROCEDURE — 87804 INFLUENZA ASSAY W/OPTIC: CPT

## 2020-01-01 PROCEDURE — 99285 EMERGENCY DEPT VISIT HI MDM: CPT

## 2020-01-01 PROCEDURE — G8484 FLU IMMUNIZE NO ADMIN: HCPCS | Performed by: NURSE PRACTITIONER

## 2020-01-01 PROCEDURE — G8482 FLU IMMUNIZE ORDER/ADMIN: HCPCS | Performed by: ANESTHESIOLOGY

## 2020-01-01 PROCEDURE — 99214 OFFICE O/P EST MOD 30 MIN: CPT | Performed by: NURSE PRACTITIONER

## 2020-01-01 PROCEDURE — 4040F PNEUMOC VAC/ADMIN/RCVD: CPT | Performed by: ANESTHESIOLOGY

## 2020-01-01 RX ORDER — SOFT LENS DISINFECTANT
SOLUTION, NON-ORAL MISCELLANEOUS
Qty: 1 DEVICE | Refills: 0 | Status: SHIPPED | OUTPATIENT
Start: 2020-01-01

## 2020-01-01 RX ORDER — DULOXETIN HYDROCHLORIDE 30 MG/1
30 CAPSULE, DELAYED RELEASE ORAL DAILY
Qty: 30 CAPSULE | Refills: 3 | Status: SHIPPED | OUTPATIENT
Start: 2020-01-01 | End: 2020-01-01 | Stop reason: SDUPTHER

## 2020-01-01 RX ORDER — ONDANSETRON 4 MG/1
4 TABLET, FILM COATED ORAL 3 TIMES DAILY PRN
Qty: 30 TABLET | Refills: 2 | Status: SHIPPED | OUTPATIENT
Start: 2020-01-01 | End: 2020-01-01 | Stop reason: SDUPTHER

## 2020-01-01 RX ORDER — AMLODIPINE BESYLATE 5 MG/1
TABLET ORAL
Qty: 90 TABLET | Refills: 3 | Status: SHIPPED | OUTPATIENT
Start: 2020-01-01

## 2020-01-01 RX ORDER — OXYCODONE AND ACETAMINOPHEN 7.5; 325 MG/1; MG/1
1 TABLET ORAL EVERY 8 HOURS PRN
Qty: 90 TABLET | Refills: 0 | Status: SHIPPED | OUTPATIENT
Start: 2020-01-01 | End: 2020-01-01 | Stop reason: SDUPTHER

## 2020-01-01 RX ORDER — FUROSEMIDE 40 MG/1
40 TABLET ORAL DAILY
COMMUNITY
End: 2021-01-01

## 2020-01-01 RX ORDER — OXYCODONE AND ACETAMINOPHEN 7.5; 325 MG/1; MG/1
1 TABLET ORAL EVERY 8 HOURS PRN
Qty: 90 TABLET | Refills: 0 | Status: SHIPPED | OUTPATIENT
Start: 2020-01-01 | End: 2021-01-01 | Stop reason: SDUPTHER

## 2020-01-01 RX ORDER — OXYCODONE AND ACETAMINOPHEN 7.5; 325 MG/1; MG/1
1 TABLET ORAL EVERY 8 HOURS PRN
Qty: 90 TABLET | Refills: 0 | Status: SHIPPED | OUTPATIENT
Start: 2020-01-01 | End: 2020-01-01

## 2020-01-01 RX ORDER — GABAPENTIN 100 MG/1
100 CAPSULE ORAL NIGHTLY
Qty: 30 CAPSULE | Refills: 0 | Status: SHIPPED | OUTPATIENT
Start: 2020-01-01 | End: 2020-01-01 | Stop reason: SDUPTHER

## 2020-01-01 RX ORDER — GABAPENTIN 100 MG/1
100 CAPSULE ORAL NIGHTLY
Qty: 30 CAPSULE | Refills: 0 | Status: SHIPPED | OUTPATIENT
Start: 2020-01-01 | End: 2021-01-01 | Stop reason: SDUPTHER

## 2020-01-01 RX ORDER — DULOXETIN HYDROCHLORIDE 30 MG/1
30 CAPSULE, DELAYED RELEASE ORAL DAILY
Qty: 30 CAPSULE | Refills: 3 | Status: SHIPPED | OUTPATIENT
Start: 2020-01-01

## 2020-01-01 RX ORDER — LISINOPRIL 20 MG/1
20 TABLET ORAL 2 TIMES DAILY
Qty: 180 TABLET | Refills: 1 | Status: SHIPPED | OUTPATIENT
Start: 2020-01-01 | End: 2020-01-01

## 2020-01-01 RX ORDER — POLYETHYLENE GLYCOL 3350 17 G/17G
17 POWDER, FOR SOLUTION ORAL DAILY PRN
Qty: 527 G | Refills: 1 | Status: SHIPPED | OUTPATIENT
Start: 2020-01-01 | End: 2021-01-01

## 2020-01-01 RX ORDER — METHYLPREDNISOLONE 4 MG/1
TABLET ORAL
Qty: 1 KIT | Refills: 0 | Status: SHIPPED | OUTPATIENT
Start: 2020-01-01 | End: 2021-01-01

## 2020-01-01 RX ORDER — CARVEDILOL 6.25 MG/1
9.38 TABLET ORAL 2 TIMES DAILY
Qty: 180 TABLET | Refills: 3 | Status: SHIPPED | OUTPATIENT
Start: 2020-01-01

## 2020-01-01 RX ORDER — PREDNISONE 20 MG/1
60 TABLET ORAL DAILY
Qty: 15 TABLET | Refills: 0 | Status: SHIPPED | OUTPATIENT
Start: 2020-01-01 | End: 2020-01-01

## 2020-01-01 RX ORDER — ONDANSETRON 4 MG/1
4 TABLET, FILM COATED ORAL DAILY PRN
Qty: 30 TABLET | Refills: 0 | Status: SHIPPED
Start: 2020-01-01 | End: 2021-01-01 | Stop reason: SDUPTHER

## 2020-01-01 RX ORDER — METHYLPREDNISOLONE 4 MG/1
TABLET ORAL
Qty: 1 KIT | Refills: 0 | Status: SHIPPED | OUTPATIENT
Start: 2020-01-01 | End: 2020-01-01

## 2020-01-01 RX ORDER — ONDANSETRON 4 MG/1
4 TABLET, ORALLY DISINTEGRATING ORAL 4 TIMES DAILY PRN
Qty: 60 TABLET | Refills: 0 | Status: SHIPPED | OUTPATIENT
Start: 2020-01-01

## 2020-01-01 RX ORDER — ALBUTEROL SULFATE 90 UG/1
2 AEROSOL, METERED RESPIRATORY (INHALATION) EVERY 6 HOURS PRN
Qty: 1 INHALER | Refills: 5 | Status: SHIPPED | OUTPATIENT
Start: 2020-01-01

## 2020-01-01 RX ORDER — CARVEDILOL 6.25 MG/1
9.38 TABLET ORAL 2 TIMES DAILY
Qty: 180 TABLET | Refills: 1 | Status: SHIPPED | OUTPATIENT
Start: 2020-01-01 | End: 2020-01-01 | Stop reason: SDUPTHER

## 2020-01-01 RX ORDER — IPRATROPIUM BROMIDE AND ALBUTEROL SULFATE 2.5; .5 MG/3ML; MG/3ML
1 SOLUTION RESPIRATORY (INHALATION) ONCE
Status: COMPLETED | OUTPATIENT
Start: 2020-01-01 | End: 2020-01-01

## 2020-01-01 RX ORDER — METHYLPREDNISOLONE SODIUM SUCCINATE 125 MG/2ML
125 INJECTION, POWDER, LYOPHILIZED, FOR SOLUTION INTRAMUSCULAR; INTRAVENOUS ONCE
Status: COMPLETED | OUTPATIENT
Start: 2020-01-01 | End: 2020-01-01

## 2020-01-01 RX ORDER — IPRATROPIUM BROMIDE AND ALBUTEROL SULFATE 2.5; .5 MG/3ML; MG/3ML
1 SOLUTION RESPIRATORY (INHALATION) EVERY 4 HOURS PRN
Qty: 360 ML | Refills: 5 | Status: SHIPPED | OUTPATIENT
Start: 2020-01-01

## 2020-01-01 RX ORDER — LISINOPRIL 20 MG/1
20 TABLET ORAL DAILY
Qty: 180 TABLET | Refills: 1 | Status: SHIPPED | OUTPATIENT
Start: 2020-01-01

## 2020-01-01 RX ORDER — SODIUM CHLORIDE 9 MG/ML
1000 INJECTION, SOLUTION INTRAVENOUS CONTINUOUS
Status: DISCONTINUED | OUTPATIENT
Start: 2020-01-01 | End: 2020-01-01 | Stop reason: HOSPADM

## 2020-01-01 RX ORDER — PANTOPRAZOLE SODIUM 20 MG/1
20 TABLET, DELAYED RELEASE ORAL
Qty: 90 TABLET | Refills: 1 | Status: SHIPPED | OUTPATIENT
Start: 2020-01-01 | End: 2020-01-01

## 2020-01-01 RX ORDER — POTASSIUM CITRATE 10 MEQ/1
20 TABLET, EXTENDED RELEASE ORAL DAILY
COMMUNITY
End: 2021-01-01

## 2020-01-01 RX ORDER — CARVEDILOL 12.5 MG/1
12.5 TABLET ORAL 2 TIMES DAILY WITH MEALS
COMMUNITY
End: 2020-01-01 | Stop reason: DRUGHIGH

## 2020-01-01 RX ORDER — CARVEDILOL 6.25 MG/1
6.25 TABLET ORAL 2 TIMES DAILY
Qty: 180 TABLET | Refills: 1 | Status: SHIPPED | OUTPATIENT
Start: 2020-01-01 | End: 2020-01-01

## 2020-01-01 RX ORDER — ONDANSETRON 4 MG/1
4 TABLET, FILM COATED ORAL 3 TIMES DAILY PRN
Qty: 30 TABLET | Refills: 2 | Status: SHIPPED | OUTPATIENT
Start: 2020-01-01 | End: 2020-01-01 | Stop reason: ALTCHOICE

## 2020-01-01 RX ORDER — ONDANSETRON 2 MG/ML
4 INJECTION INTRAMUSCULAR; INTRAVENOUS ONCE
Status: COMPLETED | OUTPATIENT
Start: 2020-01-01 | End: 2020-01-01

## 2020-01-01 RX ORDER — AMLODIPINE BESYLATE 5 MG/1
TABLET ORAL
Qty: 90 TABLET | Refills: 1 | Status: SHIPPED | OUTPATIENT
Start: 2020-01-01 | End: 2020-01-01 | Stop reason: SDUPTHER

## 2020-01-01 RX ORDER — WARFARIN SODIUM 5 MG/1
TABLET ORAL
Qty: 120 TABLET | Refills: 5 | Status: SHIPPED | OUTPATIENT
Start: 2020-01-01

## 2020-01-01 RX ADMIN — METHYLPREDNISOLONE SODIUM SUCCINATE 125 MG: 125 INJECTION, POWDER, FOR SOLUTION INTRAMUSCULAR; INTRAVENOUS at 16:19

## 2020-01-01 RX ADMIN — IPRATROPIUM BROMIDE AND ALBUTEROL SULFATE 1 AMPULE: .5; 3 SOLUTION RESPIRATORY (INHALATION) at 15:33

## 2020-01-01 RX ADMIN — ONDANSETRON 4 MG: 2 INJECTION INTRAMUSCULAR; INTRAVENOUS at 16:50

## 2020-01-01 RX ADMIN — HYDROMORPHONE HYDROCHLORIDE 0.5 MG: 1 INJECTION, SOLUTION INTRAMUSCULAR; INTRAVENOUS; SUBCUTANEOUS at 14:48

## 2020-01-01 RX ADMIN — ONDANSETRON 4 MG: 2 INJECTION INTRAMUSCULAR; INTRAVENOUS at 14:48

## 2020-01-01 RX ADMIN — SODIUM CHLORIDE 1000 ML: 9 INJECTION, SOLUTION INTRAVENOUS at 14:47

## 2020-01-01 ASSESSMENT — ENCOUNTER SYMPTOMS
WHEEZING: 1
SORE THROAT: 0
BACK PAIN: 1
ABDOMINAL PAIN: 0
ABDOMINAL PAIN: 1
COUGH: 0
COUGH: 0
ABDOMINAL PAIN: 0
BACK PAIN: 1
VOMITING: 0
ABDOMINAL PAIN: 0
NAUSEA: 1
EYE PAIN: 0
EYE PAIN: 0
DIARRHEA: 0
RHINORRHEA: 0
BLOOD IN STOOL: 0
BACK PAIN: 1
VOMITING: 0
DIARRHEA: 0
NAUSEA: 0
NAUSEA: 1
SHORTNESS OF BREATH: 1
SHORTNESS OF BREATH: 1
VOMITING: 0
SHORTNESS OF BREATH: 0
BLOOD IN STOOL: 0
NAUSEA: 0
EYE REDNESS: 0
SHORTNESS OF BREATH: 1
VOMITING: 0
EYE DISCHARGE: 0
CONSTIPATION: 0
COUGH: 1
WHEEZING: 1
BACK PAIN: 1
EYE DISCHARGE: 0
WHEEZING: 0
CONSTIPATION: 1
DIARRHEA: 0
COUGH: 1

## 2020-01-01 ASSESSMENT — PAIN SCALES - GENERAL
PAINLEVEL_OUTOF10: 10
PAINLEVEL_OUTOF10: 0
PAINLEVEL_OUTOF10: 10

## 2020-01-01 ASSESSMENT — PAIN DESCRIPTION - LOCATION: LOCATION: ABDOMEN

## 2020-01-01 ASSESSMENT — PAIN DESCRIPTION - ONSET: ONSET: SUDDEN

## 2020-01-01 ASSESSMENT — PAIN DESCRIPTION - PROGRESSION
CLINICAL_PROGRESSION: GRADUALLY WORSENING
CLINICAL_PROGRESSION: RESOLVED

## 2020-01-01 ASSESSMENT — PAIN DESCRIPTION - FREQUENCY: FREQUENCY: CONTINUOUS

## 2020-01-01 ASSESSMENT — PAIN DESCRIPTION - ORIENTATION: ORIENTATION: LEFT

## 2020-01-01 ASSESSMENT — PAIN DESCRIPTION - DESCRIPTORS: DESCRIPTORS: SHARP

## 2020-01-01 ASSESSMENT — PAIN DESCRIPTION - PAIN TYPE: TYPE: ACUTE PAIN

## 2020-01-24 ENCOUNTER — OFFICE VISIT (OUTPATIENT)
Dept: PAIN MANAGEMENT | Age: 85
End: 2020-01-24
Payer: MEDICARE

## 2020-01-24 VITALS
BODY MASS INDEX: 35.05 KG/M2 | HEART RATE: 90 BPM | WEIGHT: 197.8 LBS | DIASTOLIC BLOOD PRESSURE: 98 MMHG | HEIGHT: 63 IN | SYSTOLIC BLOOD PRESSURE: 182 MMHG

## 2020-01-24 PROBLEM — T40.2X5A THERAPEUTIC OPIOID INDUCED CONSTIPATION: Status: ACTIVE | Noted: 2020-01-24

## 2020-01-24 PROBLEM — K59.03 THERAPEUTIC OPIOID INDUCED CONSTIPATION: Status: ACTIVE | Noted: 2020-01-24

## 2020-01-24 PROBLEM — R54 ADVANCED AGE: Status: ACTIVE | Noted: 2020-01-24

## 2020-01-24 PROBLEM — F45.42 PAIN DISORDER WITH PSYCHOLOGICAL FACTORS: Status: ACTIVE | Noted: 2020-01-24

## 2020-01-24 PROBLEM — M13.0 POLYARTHROPATHY, MULTIPLE SITES: Status: ACTIVE | Noted: 2020-01-24

## 2020-01-24 PROBLEM — F11.90 CHRONIC, CONTINUOUS USE OF OPIOIDS: Status: ACTIVE | Noted: 2020-01-24

## 2020-01-24 PROBLEM — M47.26 OTHER SPONDYLOSIS WITH RADICULOPATHY, LUMBAR REGION: Status: ACTIVE | Noted: 2020-01-24

## 2020-01-24 PROBLEM — Z02.89 PAIN MEDICATION AGREEMENT: Status: ACTIVE | Noted: 2020-01-24

## 2020-01-24 PROCEDURE — 1090F PRES/ABSN URINE INCON ASSESS: CPT | Performed by: ANESTHESIOLOGY

## 2020-01-24 PROCEDURE — 1123F ACP DISCUSS/DSCN MKR DOCD: CPT | Performed by: ANESTHESIOLOGY

## 2020-01-24 PROCEDURE — 4040F PNEUMOC VAC/ADMIN/RCVD: CPT | Performed by: ANESTHESIOLOGY

## 2020-01-24 PROCEDURE — G8417 CALC BMI ABV UP PARAM F/U: HCPCS | Performed by: ANESTHESIOLOGY

## 2020-01-24 PROCEDURE — G8427 DOCREV CUR MEDS BY ELIG CLIN: HCPCS | Performed by: ANESTHESIOLOGY

## 2020-01-24 PROCEDURE — 1036F TOBACCO NON-USER: CPT | Performed by: ANESTHESIOLOGY

## 2020-01-24 PROCEDURE — 99214 OFFICE O/P EST MOD 30 MIN: CPT | Performed by: ANESTHESIOLOGY

## 2020-01-24 PROCEDURE — G8482 FLU IMMUNIZE ORDER/ADMIN: HCPCS | Performed by: ANESTHESIOLOGY

## 2020-01-24 PROCEDURE — G8400 PT W/DXA NO RESULTS DOC: HCPCS | Performed by: ANESTHESIOLOGY

## 2020-01-24 RX ORDER — OXYCODONE AND ACETAMINOPHEN 7.5; 325 MG/1; MG/1
1 TABLET ORAL EVERY 8 HOURS PRN
Qty: 90 TABLET | Refills: 0 | Status: SHIPPED | OUTPATIENT
Start: 2020-02-07 | End: 2020-01-01 | Stop reason: SDUPTHER

## 2020-01-24 RX ORDER — LUBIPROSTONE 8 UG/1
8 CAPSULE, GELATIN COATED ORAL 2 TIMES DAILY WITH MEALS
Qty: 30 CAPSULE | Refills: 0 | Status: SHIPPED | OUTPATIENT
Start: 2020-01-24 | End: 2020-01-01

## 2020-01-24 ASSESSMENT — ENCOUNTER SYMPTOMS
CONSTIPATION: 0
EYE REDNESS: 0
ABDOMINAL PAIN: 0
NAUSEA: 0
VOMITING: 0
BACK PAIN: 1
EYE PAIN: 0
WHEEZING: 0
EYE DISCHARGE: 0
COUGH: 0
SHORTNESS OF BREATH: 0

## 2020-01-24 NOTE — PROGRESS NOTES
ESOPHAGEAL ENDOSCOPIC ULTRASOUND EXAM performed by Pelon Iqbal MD at [address]      right     UPPER GASTROINTESTINAL ENDOSCOPY N/A 11/1/2018    EGD BIOPSY performed by Pelon Iqbal MD at [facility]   Allergen Reactions    Levaquin [Levofloxacin In D5w] Nausea And Vomiting    Digoxin Other (See Comments)     Pt states had to go er due to it affected her heart in a bad way-can't recall what type of reaction       Current Outpatient Medications   Medication Sig Dispense Refill    [START ON 2/7/2020] oxyCODONE-acetaminophen (PERCOCET) 7.5-325 MG per tablet Take 1 tablet by mouth every 8 hours as needed for Pain for up to 30 days. 90 tablet 0    lubiprostone (AMITIZA) 8 MCG CAPS capsule Take 1 capsule by mouth 2 times daily (with meals) 30 capsule 0    pantoprazole (PROTONIX) 40 MG tablet Take 1 tablet by mouth daily 30 tablet 0    lisinopril (PRINIVIL;ZESTRIL) 20 MG tablet Take 1 tablet by mouth daily 90 tablet 1    carvedilol (COREG) 12.5 MG tablet Take 1 tablet by mouth 2 times daily (with meals) 180 tablet 1    amLODIPine (NORVASC) 5 MG tablet TAKE ONE TABLET BY MOUTH DAILY 90 tablet 1    albuterol sulfate HFA (PROAIR HFA) 108 (90 Base) MCG/ACT inhaler Inhale 2 puffs into the lungs every 6 hours as needed for Wheezing 1 Inhaler 5    warfarin (COUMADIN) 5 MG tablet Take 5mg by mouth daily EXCEPT 7.5mg every Monday, Wednesday and Friday or as directed by [facility] [phone] (Patient taking differently: Take 7.5 mg by mouth daily EXCEPT 5 mg every Monday, Wednesday and Friday or as directed by [facility] [phone]  Goal INR = 2-3) 120 tablet 5    naloxone 4 MG/0.1ML LIQD nasal spray 1 spray by Nasal route as needed for Opioid Reversal 1 each 5     No current facility-administered medications for this visit.         Family History   Problem Relation Age of Onset    Stroke Mother     COPD Father     Coronary Art Dis Father     Other Brother         AAA rupture    Other Brother         cerebral hemorrhage    Heart Attack Son         cerebral hemorrhage as well       Social History     Socioeconomic History    Marital status:      Spouse name: Not on file    Number of children: 3    Years of education: Not on file    Highest education level: Not on file   Occupational History    Not on file   Social Needs    Financial resource strain: Not on file    Food insecurity:     Worry: Not on file     Inability: Not on file    Transportation needs:     Medical: Not on file     Non-medical: Not on file   Tobacco Use    Smoking status: Former Smoker     Packs/day: 1.00     Years: 20.00     Pack years: 20.00     Types: Cigarettes     Last attempt to quit: 1980     Years since quittin.0    Smokeless tobacco: Never Used   Substance and Sexual Activity    Alcohol use: No     Alcohol/week: 0.0 standard drinks    Drug use: No    Sexual activity: Not on file   Lifestyle    Physical activity:     Days per week: Not on file     Minutes per session: Not on file    Stress: Not on file   Relationships    Social connections:     Talks on phone: Not on file     Gets together: Not on file     Attends Spiritism service: Not on file     Active member of club or organization: Not on file     Attends meetings of clubs or organizations: Not on file     Relationship status: Not on file    Intimate partner violence:     Fear of current or ex partner: Not on file     Emotionally abused: Not on file     Physically abused: Not on file     Forced sexual activity: Not on file   Other Topics Concern    Not on file   Social History Narrative    Not on file         Imaging Studies:   CT spine (2017)  \"Impression   No acute osseous abnormality of the thoracic or lumbar spine.       Study slightly motion compromised with no definite acute soft tissue   abnormality.       Advanced degenerative changes of the lower lumbar spine appear similar to the   prior study. \"          Results of the above studies were personally reviewed by me with the patient indetail along with the images, when available. The patient was instructed to contact the primary care provider regarding other unrelated findings not addressed during today's visit. Review of Systems:   Review of Systems   Constitutional: Negative for chills and fever. HENT: Negative for hearing loss and tinnitus. Eyes: Negative for pain, discharge and redness. Respiratory: Negative for cough, shortness of breath and wheezing. Cardiovascular: Negative for chest pain and palpitations. Gastrointestinal: Negative for abdominal pain, constipation, nausea and vomiting. Genitourinary: Negative for frequency, hematuria and urgency. Musculoskeletal: Positive for arthralgias and back pain. Negative for myalgias and neck pain. Skin: Negative for rash. Neurological: Negative for dizziness, seizures, weakness and headaches. Hematological: Does not bruise/bleed easily. Psychiatric/Behavioral: Negative for suicidal ideas. The patient is not nervous/anxious. The patient was instructed to contact primary care physician regarding ROS positives not being addressed during today's visit. Physical Exam:   Physical Exam  Constitutional:       General: She is not in acute distress. Appearance: She is not diaphoretic. HENT:      Head: Normocephalic. Eyes:      Pupils: Pupils are equal, round, and reactive to light. Neck:      Musculoskeletal: Normal range of motion and neck supple. Thyroid: No thyromegaly. Cardiovascular:      Rate and Rhythm: Normal rate and regular rhythm. Heart sounds: Normal heart sounds. Pulmonary:      Effort: Pulmonary effort is normal. No respiratory distress. Breath sounds: Normal breath sounds. Chest:      Chest wall: No tenderness.    Abdominal:      General: Bowel sounds are normal.      Palpations: Abdomen is soft. There is no mass. Tenderness: There is no tenderness. There is no guarding. Musculoskeletal: Normal range of motion. General: No tenderness or deformity. Lymphadenopathy:      Cervical: No cervical adenopathy. Skin:     General: Skin is warm. Findings: No rash. Neurological:      Mental Status: She is alert and oriented to person, place, and time. Cranial Nerves: No cranial nerve deficit. Sensory: No sensory deficit. Motor: No abnormal muscle tone. Coordination: Coordination normal.      Gait: Gait normal.      Deep Tendon Reflexes: Reflexes are normal and symmetric. Reflexes normal. Babinski sign absent on the right side. Babinski sign absent on the left side. Reflex Scores:       Tricep reflexes are 2+ on the right side and 2+ on the left side. Bicep reflexes are 2+ on the right side and 2+ on the left side. Brachioradialis reflexes are 2+ on the right side and 2+ on the left side. Patellar reflexes are 2+ on the right side and 2+ on the left side. Achilles reflexes are 2+ on the right side and 2+ on the left side. Psychiatric:         Behavior: Behavior normal.         Thought Content: Thought content normal.         Vitals:    01/24/20 1312   BP: (!) 182/98   Site: Left Lower Arm   Position: Sitting   Cuff Size: Medium Adult   Pulse: 90   Weight: 197 lb 12.8 oz (89.7 kg)   Height: 5' 3\" (1.6 m)       Body mass index is 35.04 kg/m². Pain Score:   8 /10    Goals of chronic pain therapy:      Multi-disciplinary comprehensive pain management with functional improvement and reduced opioid use. Prescription pain medication monitoring:      MEDD current = 67.50   ORT Score = 0   LOW     Other Risk factors - CAD, COPD, gastric ulcer, advanced age.        Date of Last Medication Agreement: NA   Date Naloxone prescribed: NA     UDT:    Date of last UDT: NA    Adverse report: No     OARRS:    Checked today: Yes    Adverse report:

## 2020-01-24 NOTE — PATIENT INSTRUCTIONS
medicine while you are pregnant, your baby could become dependent on the drug. This can cause life-threatening withdrawal symptoms in the baby after it is born. Babies born dependent on opioids may need medical treatment for several weeks. Do not breast-feed. This medicine can pass into breast milk and cause drowsiness, breathing problems, or death in a nursing baby. How should I take acetaminophen and oxycodone? Follow all directions on your prescription label. Never take this medicine in larger amounts, or for longer than prescribed. An overdose can damage your liver or cause death. Tell your doctor if the medicine seems to stop working as well in relieving your pain. Never share this medicine with another person, especially someone with a history of drug abuse or addiction. MISUSE CAN CAUSE ADDICTION, OVERDOSE, OR DEATH. Keep the medicine in a place where others cannot get to it. Selling or giving away acetaminophen and oxycodone is against the law. Measure liquid medicine carefully. Use the dosing syringe provided, or use a medicine dose-measuring device (not a kitchen spoon). If you need surgery or medical tests, tell the doctor ahead of time that you are using this medicine. You should not stop using this medicine suddenly. Follow your doctor's instructions about tapering your dose. Store at room temperature away from moisture and heat. Keep track of your medicine. You should be aware if anyone is using it improperly or without a prescription. Do not keep leftover opioid medication. Just one dose can cause death in someone using this medicine accidentally or improperly. Ask your pharmacist where to locate a drug take-back disposal program. If there is no take-back program, flush the unused medicine down the toilet. What happens if I miss a dose? Since this medicine is used for pain, you are not likely to miss a dose. Skip any missed dose if it is almost time for your next dose.  Do not use two doses back into the floor. You should feel your hips and pelvis rock back. 4. Hold for 6 seconds while breathing smoothly. 5. Relax and allow your pelvis and hips to rock forward. 6. Repeat 8 to 12 times. Back stretches   1. Get down on your hands and knees on the floor. 2. Relax your head and allow it to droop. Round your back up toward the ceiling until you feel a nice stretch in your upper, middle, and lower back. Hold this stretch for as long as it feels comfortable, or about 15 to 30 seconds. 3. Return to the starting position with a flat back while you are on your hands and knees. 4. Let your back sway by pressing your stomach toward the floor. Lift your buttocks toward the ceiling. 5. Hold this position for 15 to 30 seconds. 6. Repeat 2 to 4 times. Follow-up care is a key part of your treatment and safety. Be sure to make and go to all appointments, and call your doctor if you are having problems. It's also a good idea to know your test results and keep a list of the medicines you take. Where can you learn more? Go to https://[employer]. org and sign in to your [employer] account. Enter B743 in the [facility] box to learn more about \"Low Back Arthritis: Exercises. \"     If you do not have an account, please click on the \"Sign Up Now\" link. Current as of: June 26, 2019  Content Version: 12.3  © 6601-7172 [employer]. Care instructions adapted under license by [facility] ([facility]). If you have questions about a medical condition or this instruction, always ask your healthcare professional. [address] any warranty or liability for your use of this information.

## 2020-01-24 NOTE — LETTER
MEDICATION AGREEMENT     Rody Garcia  [birth year]      For certain conditions, multiple classes of medications may be used to help better manage your symptoms, and to improve your ability to function at home, work and in social settings. However, these medications do have risks, which will be discussed with you, including addiction and dependency. The following prescribed medications need frequent monitoring and will require you to partner and assist in your healthcare. Medication  Dose, instructions and quantity as indicated on current prescription bottle Diagnosis/Reason(s) for Taking Category   Narcotic Agreement                               Benefits and goals of Controlled Substance Medications: There are two potential goals for your treatment: (1) decreased pain and suffering (2) improved daily life functions. There are many possible treatments for your chronic condition(s), and, in addition to controlled substance medications, we will try alternatives such as physical therapy, yoga, massage, home daily exercise, meditation, relaxation techniques, injections, chiropractic manipulations, surgery, cognitive therapy, hypnosis and many medications that are not habit-forming. Use of controlled substance medications may be helpful, but they are unlikely to resolve all of your symptoms or restore all function. Risks of Controlled Substance Medications:    Opioid pain medications: These medications can lead to problems such as addiction/dependence, sedation, lightheadedness/dizziness, memory issues, falls, constipation, nausea, or vomiting. They may also impair the ability to drive or operate machinery. Additionally, these medications may lower testosterone levels, leading to loss of bone strength, stamina and sex drive.   They may cause problems with breathing, sleep apnea and reduced coughing, which are especially dangerous for patients with lung disease. Overdose or dangerous interactions with alcohol and other medications may occur, leading to death. Hyperalgesia may develop, in which patients receiving opioids for the treatment of pain may actually become more sensitive to certain painful stimuli, and in some cases, experience pain from ordinarily non-painful stimuli. Women between the ages of 14-53 who could become pregnant should carefully weigh the risks and benefits of opioids with their physicians, as these medications increase the risk of pregnancy complications, including miscarriage,  delivery and stillbirth. It is also possible for babies to be born addicted to opioids. Opioid dependence withdrawal symptoms may include; feelings of uneasiness, increased pain, irritability, belly pain, diarrhea, sweats and goose-flesh. Benzodiazepines and non-benzodiazepine sleep medications: These medications can lead to problems such as addiction/dependence, sedation, fatigue, lightheadedness, dizziness, incoordination, falls, depression, hallucinations, and impaired judgment, memory and concentration. The ability to drive and operate machinery may also be affected. Abnormal sleep-related behaviors have been reported, including sleep walking, driving, making telephone calls, eating, or having sex while not fully awake. These medications can suppress breathing and worsen sleep apnea, particularly when combined with alcohol or other sedating medications, potentially leading to death. Dependence withdrawal symptoms may include tremors, anxiety, hallucinations and seizures. Stimulants:  Common adverse effects include addiction/dependence, increased blood pressure and heart rate, decreased appetite, nausea, involuntary weight loss, insomnia, irritability, and headaches.   These risks may increase when these medications are combined with other stimulants, such as caffeine pills or energy drinks, certain weight loss which may also result in my being prevented from receiving further care from this office. · Other:____________________________________________________________________    AGREEMENT:    I have read the above and have had all of my questions answered. For chronic disease management, I know that my symptoms can be managed with many types of treatments. A chronic medication trial may be part of my treatment, but I must be an active participant in my care. Medication therapy is only one part of my symptom management plan. In some cases, there may be limited scientific evidence to support the chronic use of certain medications to improve symptoms and daily function. Furthermore, in certain circumstances, there may be scientific information that suggests that use of chronic controlled substances may actually worsen my symptoms and increase my risk of unintentional death directly related to this medication therapy. I know that if my provider feels my risk from controlled medications is greater than my benefit, I will have my controlled substance medication(s) compassionately lowered or removed altogether. I agree to a controlled substance medication trial.      I further agree to allow this office to contact my HIPAA contact on file if there are concerns about my safety and use of controlled medications. I have agreed to use the following medications above as instructed by my physician and as stated in this [id number].      Patient Signature:  ______________________  Date:1/24/2020     Provider Signature:______________________  Date:1/24/2020

## 2020-02-10 RX ORDER — METOCLOPRAMIDE 5 MG/1
TABLET ORAL
COMMUNITY
Start: 2020-01-13 | End: 2020-01-01

## 2020-02-11 ENCOUNTER — OFFICE VISIT (OUTPATIENT)
Dept: PRIMARY CARE CLINIC | Age: 85
End: 2020-02-11
Payer: MEDICARE

## 2020-02-11 VITALS
SYSTOLIC BLOOD PRESSURE: 116 MMHG | RESPIRATION RATE: 16 BRPM | BODY MASS INDEX: 34.55 KG/M2 | HEIGHT: 63 IN | WEIGHT: 195 LBS | DIASTOLIC BLOOD PRESSURE: 68 MMHG | OXYGEN SATURATION: 95 % | TEMPERATURE: 98.6 F | HEART RATE: 75 BPM

## 2020-02-11 LAB
ANION GAP SERPL CALCULATED.3IONS-SCNC: 12 MMOL/L (ref 3–16)
BASOPHILS ABSOLUTE: 0.1 K/UL (ref 0–0.2)
BASOPHILS RELATIVE PERCENT: 0.6 %
BUN BLDV-MCNC: 14 MG/DL (ref 7–20)
CALCIUM SERPL-MCNC: 9.6 MG/DL (ref 8.3–10.6)
CHLORIDE BLD-SCNC: 101 MMOL/L (ref 99–110)
CO2: 29 MMOL/L (ref 21–32)
CREAT SERPL-MCNC: 0.6 MG/DL (ref 0.6–1.2)
EOSINOPHILS ABSOLUTE: 0.2 K/UL (ref 0–0.6)
EOSINOPHILS RELATIVE PERCENT: 2 %
GFR AFRICAN AMERICAN: >60
GFR NON-AFRICAN AMERICAN: >60
GLUCOSE BLD-MCNC: 133 MG/DL (ref 70–99)
HCT VFR BLD CALC: 42.3 % (ref 36–48)
HEMOGLOBIN: 14.2 G/DL (ref 12–16)
INR BLD: 2.7 (ref 0.86–1.14)
LYMPHOCYTES ABSOLUTE: 2.4 K/UL (ref 1–5.1)
LYMPHOCYTES RELATIVE PERCENT: 28 %
MCH RBC QN AUTO: 31 PG (ref 26–34)
MCHC RBC AUTO-ENTMCNC: 33.6 G/DL (ref 31–36)
MCV RBC AUTO: 92.1 FL (ref 80–100)
MONOCYTES ABSOLUTE: 0.7 K/UL (ref 0–1.3)
MONOCYTES RELATIVE PERCENT: 8.6 %
NEUTROPHILS ABSOLUTE: 5.2 K/UL (ref 1.7–7.7)
NEUTROPHILS RELATIVE PERCENT: 60.8 %
PDW BLD-RTO: 14.3 % (ref 12.4–15.4)
PLATELET # BLD: 211 K/UL (ref 135–450)
PMV BLD AUTO: 8.2 FL (ref 5–10.5)
POTASSIUM SERPL-SCNC: 5.4 MMOL/L (ref 3.5–5.1)
PROTHROMBIN TIME: 31.6 SEC (ref 10–13.2)
RBC # BLD: 4.6 M/UL (ref 4–5.2)
SODIUM BLD-SCNC: 142 MMOL/L (ref 136–145)
WBC # BLD: 8.6 K/UL (ref 4–11)

## 2020-02-11 PROCEDURE — 36415 COLL VENOUS BLD VENIPUNCTURE: CPT | Performed by: NURSE PRACTITIONER

## 2020-02-11 PROCEDURE — 99215 OFFICE O/P EST HI 40 MIN: CPT | Performed by: NURSE PRACTITIONER

## 2020-02-11 PROCEDURE — 3023F SPIROM DOC REV: CPT | Performed by: NURSE PRACTITIONER

## 2020-02-11 PROCEDURE — G8926 SPIRO NO PERF OR DOC: HCPCS | Performed by: NURSE PRACTITIONER

## 2020-02-11 PROCEDURE — 1036F TOBACCO NON-USER: CPT | Performed by: NURSE PRACTITIONER

## 2020-02-11 PROCEDURE — 4040F PNEUMOC VAC/ADMIN/RCVD: CPT | Performed by: NURSE PRACTITIONER

## 2020-02-11 PROCEDURE — G8427 DOCREV CUR MEDS BY ELIG CLIN: HCPCS | Performed by: NURSE PRACTITIONER

## 2020-02-11 PROCEDURE — G8417 CALC BMI ABV UP PARAM F/U: HCPCS | Performed by: NURSE PRACTITIONER

## 2020-02-11 PROCEDURE — 1123F ACP DISCUSS/DSCN MKR DOCD: CPT | Performed by: NURSE PRACTITIONER

## 2020-02-11 PROCEDURE — G8400 PT W/DXA NO RESULTS DOC: HCPCS | Performed by: NURSE PRACTITIONER

## 2020-02-11 PROCEDURE — 1090F PRES/ABSN URINE INCON ASSESS: CPT | Performed by: NURSE PRACTITIONER

## 2020-02-11 PROCEDURE — G8482 FLU IMMUNIZE ORDER/ADMIN: HCPCS | Performed by: NURSE PRACTITIONER

## 2020-02-11 RX ORDER — PANTOPRAZOLE SODIUM 40 MG/1
40 TABLET, DELAYED RELEASE ORAL DAILY
Qty: 90 TABLET | Refills: 0 | Status: SHIPPED | OUTPATIENT
Start: 2020-02-11 | End: 2020-01-01

## 2020-02-11 RX ORDER — ONDANSETRON 4 MG/1
4 TABLET, FILM COATED ORAL 3 TIMES DAILY PRN
Qty: 30 TABLET | Refills: 0 | Status: SHIPPED
Start: 2020-02-11 | End: 2020-01-01 | Stop reason: DRUGHIGH

## 2020-02-11 ASSESSMENT — ENCOUNTER SYMPTOMS
SORE THROAT: 0
TROUBLE SWALLOWING: 0
CONSTIPATION: 1
NAUSEA: 0
CHEST TIGHTNESS: 0
VOMITING: 0
DIARRHEA: 0
COUGH: 0
WHEEZING: 0
SINUS PAIN: 0
ABDOMINAL PAIN: 0
FACIAL SWELLING: 0
EYE PAIN: 0

## 2020-02-11 ASSESSMENT — PATIENT HEALTH QUESTIONNAIRE - PHQ9
2. FEELING DOWN, DEPRESSED OR HOPELESS: 0
SUM OF ALL RESPONSES TO PHQ QUESTIONS 1-9: 0
SUM OF ALL RESPONSES TO PHQ QUESTIONS 1-9: 0
SUM OF ALL RESPONSES TO PHQ9 QUESTIONS 1 & 2: 0
1. LITTLE INTEREST OR PLEASURE IN DOING THINGS: 0

## 2020-02-11 NOTE — PROGRESS NOTES
2020    Rosy Singer (:  1935) neto 80 y.o. 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      Patient reports that she needs med refills for protonix and zofran as she has not followed up with Dr. Krys Gómez in over a year. She states that the medications had been effective. She denies vomiting, diarrhea, hematochezia, or blood in stool. Patient did get established with Dr. June Diaz for management of her polyarthropathy and back pain. She states pain is worsened with daily activity and weather changes. She denies new onset weakness, numbness, bladder or bowel incontinence.      Patient asking why Mindy Keller had changed carvedilol rx written by Dr. Mary Jo Garcia. Chart review shows that pt had historically been taking 1/2 tablet of 25mg carvedilol, Mindy Check had changed rx to full 12.5 table. Patient reports that she hasn't seen Dr. Mary Jo Garcia in some time. She also hasn't gone to coumadin clinic in 2 months because shes \"too nauseated in the morning to go because of my stomach\"  . She is currently on warfarin 7.5mg daily except MWF    Review of Systems   Constitutional: Positive for fatigue. Negative for chills and fever. HENT: Negative for congestion, ear pain, facial swelling, sinus pain, sore throat and trouble swallowing. Eyes: Negative for pain and visual disturbance. Respiratory: Positive for shortness of breath (exertional-baseline). Negative for cough, chest tightness and wheezing. COPD   Cardiovascular: Positive for leg swelling. Negative for chest pain and palpitations. Cad,htn,a-fib (cards and anticoag q 2 weeks)   Gastrointestinal: Positive for constipation (opioid induced-chronic). Negative for abdominal pain, diarrhea, nausea and vomiting. Endocrine: Negative for polydipsia and polyuria.    Genitourinary: Negative for difficulty urinating and hematuria. Musculoskeletal: Positive for arthralgias, gait problem and joint swelling. Negative for myalgias. Bilateral shoulder pain (R>L)>>chronic and unchanged     Fibromyalgia     Chronic left hip pain   Skin: Negative for pallor and rash. Allergic/Immunologic: Negative for environmental allergies and food allergies. Neurological: Negative for dizziness, syncope, weakness, numbness and headaches. Hematological: Negative for adenopathy. Does not bruise/bleed easily. Psychiatric/Behavioral: Negative for dysphoric mood and suicidal ideas. Prior to Visit Medications    Medication Sig Taking? Authorizing Provider   pantoprazole (PROTONIX) 40 MG tablet Take 1 tablet by mouth daily Yes Edrick Kocher, APRN - CNP   ondansetron (ZOFRAN) 4 MG tablet Take 1 tablet by mouth 3 times daily as needed for Nausea or Vomiting Yes Edrick Kocher, APRN - CNP   oxyCODONE-acetaminophen (PERCOCET) 7.5-325 MG per tablet Take 1 tablet by mouth every 8 hours as needed for Pain for up to 30 days.  Yes Tiffany Stevens MD   lubiprostone (AMITIZA) 8 MCG CAPS capsule Take 1 capsule by mouth 2 times daily (with meals) Yes Tiffany Stevens MD   lisinopril (PRINIVIL;ZESTRIL) 20 MG tablet Take 1 tablet by mouth daily Yes ANDREZ Lara CNP   carvedilol (COREG) 12.5 MG tablet Take 1 tablet by mouth 2 times daily (with meals) Yes ANDREZ Lara CNP   amLODIPine (NORVASC) 5 MG tablet TAKE ONE TABLET BY MOUTH DAILY Yes ANDREZ Lara CNP   albuterol sulfate HFA (PROAIR HFA) 108 (90 Base) MCG/ACT inhaler Inhale 2 puffs into the lungs every 6 hours as needed for Wheezing Yes ANDREZ Lara CNP   warfarin (COUMADIN) 5 MG tablet Take 5mg by mouth daily EXCEPT 7.5mg every Monday, Wednesday and Friday or as directed by [facility] [phone]  Patient taking differently: Take 7.5 mg by mouth daily EXCEPT 5 mg every file    Number of children: 3    Years of education: Not on file    Highest education level: Not on file   Occupational History    Not on file   Social Needs    Financial resource strain: Not on file    Food insecurity:     Worry: Not on file     Inability: Not on file    Transportation needs:     Medical: Not on file     Non-medical: Not on file   Tobacco Use    Smoking status: Former Smoker     Packs/day: 1.00     Years: 20.00     Pack years: 20.00     Types: Cigarettes     Last attempt to quit: 1980     Years since quittin.1    Smokeless tobacco: Never Used   Substance and Sexual Activity    Alcohol use: No     Alcohol/week: 0.0 standard drinks    Drug use: No    Sexual activity: Not Currently   Lifestyle    Physical activity:     Days per week: Not on file     Minutes per session: Not on file    Stress: Not on file   Relationships    Social connections:     Talks on phone: Not on file     Gets together: Not on file     Attends Muslim service: Not on file     Active member of club or organization: Not on file     Attends meetings of clubs or organizations: Not on file     Relationship status: Not on file    Intimate partner violence:     Fear of current or ex partner: Not on file     Emotionally abused: Not on file     Physically abused: Not on file     Forced sexual activity: Not on file   Other Topics Concern    Not on file   Social History Narrative    Not on file        Family History   Problem Relation Age of Onset    Stroke Mother     COPD Father     Coronary Art Dis Father     Other Brother         AAA rupture    Other Brother         cerebral hemorrhage    Heart Attack Son         cerebral hemorrhage as well       Vitals:    20 1011   BP: 116/68   Site: Left Upper Arm   Position: Sitting   Cuff Size: Large Adult   Pulse: 75   Resp: 16   Temp: 98.6 °F (37 °C)   TempSrc: Oral   SpO2: 95%   Weight: 195 lb (88.5 kg)   Height: 5' 3\" (1.6 m)     Estimated body mass index is 34.54 kg/m² as calculated from the following:    Height as of this encounter: 5' 3\" (1.6 m). Weight as of this encounter: 195 lb (88.5 kg). Physical Exam  Vitals signs reviewed. Constitutional:       Appearance: She is well-developed. HENT:      Right Ear: Tympanic membrane, ear canal and external ear normal.      Left Ear: Tympanic membrane, ear canal and external ear normal.      Nose: Nose normal.      Mouth/Throat:      Mouth: Mucous membranes are moist.      Pharynx: Oropharynx is clear. Eyes:      Conjunctiva/sclera: Conjunctivae normal.      Pupils: Pupils are equal, round, and reactive to light. Neck:      Musculoskeletal: Normal range of motion and neck supple. Thyroid: No thyromegaly. Cardiovascular:      Rate and Rhythm: Normal rate. Rhythm irregular. Pulses: Normal pulses. Heart sounds: Murmur present. Pulmonary:      Effort: Pulmonary effort is normal.      Breath sounds: Normal breath sounds. Abdominal:      General: Bowel sounds are normal.      Palpations: Abdomen is soft. Tenderness: There is no abdominal tenderness. Musculoskeletal: Normal range of motion. General: Deformity (OA changes of hands) present. Skin:     General: Skin is warm and dry. Neurological:      Mental Status: She is alert and oriented to person, place, and time. Psychiatric:         Judgment: Judgment normal.         ASSESSMENT/PLAN:  1. Osteoarthritis, generalized  -f/u Dr. Arjun Vazquez as directed  - Steven Morataya MD, Internal Medicine, [facility] [place]    2. Atrial fibrillation, chronic  -Continue warfarin dosage as directed  -Encouraged pt to attend biweekly Coumadin clinic for surveillance  - Randa Atkinson MD, Internal Medicine, [facility] [place]    3.  Essential hypertension  -Discussed AHA recommendations for dietary sodium intake  -Discussed low fat diet with increasing activity  - CBC Auto Differential  - [address] [place] [address] Rd Krystal Archer MD, Internal Medicine, [facility]    4. COPD  -Stable  - [facility] MD, Internal Medicine, [facility]    5. Gastroesophageal reflux disease, esophagitis presence not specified  -Discussed avoiding trigger foods  - pantoprazole (PROTONIX) 40 MG tablet; Take 1 tablet by mouth daily  Dispense: 90 tablet; Refill: 0  - ondansetron (ZOFRAN) 4 MG tablet; Take 1 tablet by mouth 3 times daily as needed for Nausea or Vomiting  Dispense: 30 tablet; Refill: 0  - Williams Colorado MD, Internal Medicine, [facility]    Pt requesting to be transferred to clinic closer to her home. She states that she would like to go to [facility] as it is 5 minutes from her home. Will send referral to Dr. Rae Bang. Return if symptoms worsen or fail to improve.

## 2020-02-11 NOTE — PATIENT INSTRUCTIONS
Patient Education        Arthritis: Care Instructions  Your Care Instructions  Arthritis, also called osteoarthritis, is a breakdown of the cartilage that cushions your joints. When the cartilage wears down, your bones rub against each other. This causes pain and stiffness. Many people have some arthritis as they age. Arthritis most often affects the joints of the spine, hands, hips, knees, or feet. You can take simple measures to protect your joints, ease your pain, and help you stay active. Follow-up care is a key part of your treatment and safety. Be sure to make and go to all appointments, and call your doctor if you are having problems. It's also a good idea to know your test results and keep a list of the medicines you take. How can you care for yourself at home? · Stay at a healthy weight. Being overweight puts extra strain on your joints. · Talk to your doctor or physical therapist about exercises that will help ease joint pain. ? Stretch. You may enjoy gentle forms of yoga to help keep your joints and muscles flexible. ? Walk instead of jog. Other types of exercise that are less stressful on the joints include riding a bicycle, swimming, bijan chi, or water exercise. ? Lift weights. Strong muscles help reduce stress on your joints. Stronger thigh muscles, for example, take some of the stress off of the knees and hips. Learn the right way to lift weights so you do not make joint pain worse. · Take your medicines exactly as prescribed. Call your doctor if you think you are having a problem with your medicine. · Take pain medicines exactly as directed. ? If the doctor gave you a prescription medicine for pain, take it as prescribed. ? If you are not taking a prescription pain medicine, ask your doctor if you can take an over-the-counter medicine. · Use a cane, crutch, walker, or another device if you need help to get around. These can help rest your joints.  You also can use other things to make stomach. A one-way valve prevents the stomach acid from moving up into this tube. When you have GERD, this valve does not close tightly enough. If you have mild GERD symptoms including heartburn, you may be able to control the problem with antacids or over-the-counter medicine. Changing your diet, losing weight, and making other lifestyle changes can also help reduce symptoms. Follow-up care is a key part of your treatment and safety. Be sure to make and go to all appointments, and call your doctor if you are having problems. It's also a good idea to know your test results and keep a list of the medicines you take. How can you care for yourself at home? · Take your medicines exactly as prescribed. Call your doctor if you think you are having a problem with your medicine. · Your doctor may recommend over-the-counter medicine. For mild or occasional indigestion, antacids, such as Tums, Gaviscon, Mylanta, or Maalox, may help. Your doctor also may recommend over-the-counter acid reducers, such as Pepcid AC, Tagamet HB, Zantac 75, or Prilosec. Read and follow all instructions on the label. If you use these medicines often, talk with your doctor. · Change your eating habits. ? It's best to eat several small meals instead of two or three large meals. ? After you eat, wait 2 to 3 hours before you lie down. ? Chocolate, mint, and alcohol can make GERD worse. ? Spicy foods, foods that have a lot of acid (like tomatoes and oranges), and coffee can make GERD symptoms worse in some people. If your symptoms are worse after you eat a certain food, you may want to stop eating that food to see if your symptoms get better. · Do not smoke or chew tobacco. Smoking can make GERD worse. If you need help quitting, talk to your doctor about stop-smoking programs and medicines. These can increase your chances of quitting for good.   · If you have GERD symptoms at night, raise the head of your bed 6 to 8 inches by putting the frame your health and your age. Lifestyle changes, such as eating healthy and being active, are always important to help lower blood pressure. You might also take medicine to reach your blood pressure goal.  Follow-up care is a key part of your treatment and safety. Be sure to make and go to all appointments, and call your doctor if you are having problems. It's also a good idea to know your test results and keep a list of the medicines you take. How can you care for yourself at home? Medical treatment  · If you stop taking your medicine, your blood pressure will go back up. You may take one or more types of medicine to lower your blood pressure. Be safe with medicines. Take your medicine exactly as prescribed. Call your doctor if you think you are having a problem with your medicine. · Talk to your doctor before you start taking aspirin every day. Aspirin can help certain people lower their risk of a heart attack or stroke. But taking aspirin isn't right for everyone, because it can cause serious bleeding. · See your doctor regularly. You may need to see the doctor more often at first or until your blood pressure comes down. · If you are taking blood pressure medicine, talk to your doctor before you take decongestants or anti-inflammatory medicine, such as ibuprofen. Some of these medicines can raise blood pressure. · Learn how to check your blood pressure at home. Lifestyle changes  · Stay at a healthy weight. This is especially important if you put on weight around the waist. Losing even 10 pounds can help you lower your blood pressure. · If your doctor recommends it, get more exercise. Walking is a good choice. Bit by bit, increase the amount you walk every day. Try for at least 30 minutes on most days of the week. You also may want to swim, bike, or do other activities. · Avoid or limit alcohol. Talk to your doctor about whether you can drink any alcohol.   · Try to limit how much sodium you eat to less than 2,300 milligrams (mg) a day. Your doctor may ask you to try to eat less than 1,500 mg a day. · Eat plenty of fruits (such as bananas and oranges), vegetables, legumes, whole grains, and low-fat dairy products. · Lower the amount of saturated fat in your diet. Saturated fat is found in animal products such as milk, cheese, and meat. Limiting these foods may help you lose weight and also lower your risk for heart disease. · Do not smoke. Smoking increases your risk for heart attack and stroke. If you need help quitting, talk to your doctor about stop-smoking programs and medicines. These can increase your chances of quitting for good. When should you call for help? Call  911 anytime you think you may need emergency care. This may mean having symptoms that suggest that your blood pressure is causing a serious heart or blood vessel problem. Your blood pressure may be over 180/120.   For example, call  911 if:    · You have symptoms of a heart attack. These may include:  ? Chest pain or pressure, or a strange feeling in the chest.  ? Sweating. ? Shortness of breath. ? Nausea or vomiting. ? Pain, pressure, or a strange feeling in the back, neck, jaw, or upper belly or in one or both shoulders or arms. ? Lightheadedness or sudden weakness. ? A fast or irregular heartbeat.     · You have symptoms of a stroke. These may include:  ? Sudden numbness, tingling, weakness, or loss of movement in your face, arm, or leg, especially on only one side of your body. ? Sudden vision changes. ? Sudden trouble speaking. ? Sudden confusion or trouble understanding simple statements. ? Sudden problems with walking or balance. ? A sudden, severe headache that is different from past headaches.     · You have severe back or belly pain.    Do not wait until your blood pressure comes down on its own.  Get help right away.   Call your doctor now or seek immediate care if:    · Your blood pressure is much higher than normal (such as blood clots very well. You will need regular blood tests to check how long it takes for your blood to form a clot. This test is called a PT or prothrombin time test. The result of the test is called an INR level. Depending on the test results, your doctor or anticoagulation clinic may adjust your dose of warfarin. Follow-up care is a key part of your treatment and safety. Be sure to make and go to all appointments, and call your doctor if you are having problems. It's also a good idea to know your test results and keep a list of the medicines you take. How can you care for yourself at home? Take warfarin safely  · Take your warfarin at the same time each day. · If you miss a dose of warfarin, don't take an extra dose to make up for it. Your doctor can tell you exactly what to do so you don't take too much or too little. · Wear medical alert jewelry that lets others know that you take warfarin. You can buy this at most drugstores. · Don't take warfarin if you are pregnant or planning to get pregnant. Talk to your doctor about how you can prevent getting pregnant while you are taking it. · Don't change your dose or stop taking warfarin unless your doctor tells you to. Effects of medicines and food on warfarin  · Don't start or stop taking any medicines, vitamins, or natural remedies unless you first talk to your doctor. Many medicines can affect how warfarin works. These include aspirin and other pain relievers, over-the-counter medicines, multivitamins, dietary supplements, and herbal products. · Tell all of your doctors and pharmacists that you take warfarin. Some prescription medicines can affect how warfarin works. · Keep the amount of vitamin K in your diet about the same from day to day. Do not suddenly eat a lot more or a lot less food that is rich in vitamin K than you usually do. Vitamin K affects how warfarin works and how your blood clots.  Talk with your doctor before making big changes in your

## 2020-02-16 ASSESSMENT — ENCOUNTER SYMPTOMS: SHORTNESS OF BREATH: 1

## 2020-02-25 NOTE — PATIENT INSTRUCTIONS
Please call your pharmacy if you need any refills of your medication(s). Please call our office at [phone] if you don't hear from us about your test results. Bring an accurate list of your medications with you at every appointment to ensure that we have the correct information.     Our office hours are: Monday - Friday 8:30 am- 5 pm    Phone lines turn on at 8:30 am

## 2020-02-25 NOTE — PROGRESS NOTES
SUBJECTIVE:  Andres Pacheco is a 80 y.o. female being evaluated for:    Chief Complaint   Patient presents with   [facility] Patient here to get established. Previous pcp was Dr Sue Stout, JESSI and Richar Morales CNP.        HPI   Here to establish    Blood pressure is high  Chest pain on and off all the time and follows with Dr Lucero Steven It comes and goes  Not worsening    Atrial fib trouble getting to the coumadin clinic  Needs daughter to drive her  Chronic atrial fib Taking only coreg once a day and if takes twice walks into walls  Feels over medicated   Every am wakes up nauseated No vomiting  Hx of ulcers  Sees Dr Jackeline Loco  Never got her tests done   Neck and back hurts on oxycodone  Seeing new pain doctor Dr Freddie Laboy in [address] and trouble breathing SOb with activity WOrsening       Allergies   Allergen Reactions    Levaquin [Levofloxacin In D5w] Nausea And Vomiting    Digoxin Other (See Comments)     Pt states had to go er due to it affected her heart in a bad way-can't recall what type of reaction     Current Outpatient Medications   Medication Sig Dispense Refill    lisinopril (PRINIVIL;ZESTRIL) 20 MG tablet Take 1 tablet by mouth 2 times daily 180 tablet 1    carvedilol (COREG) 6.25 MG tablet Take 1 tablet by mouth 2 times daily 180 tablet 1    pantoprazole (PROTONIX) 20 MG tablet Take 1 tablet by mouth every morning (before breakfast) 90 tablet 1    ondansetron (ZOFRAN) 4 MG tablet Take 1 tablet by mouth 3 times daily as needed for Nausea or Vomiting 30 tablet 2    amLODIPine (NORVASC) 5 MG tablet TAKE ONE TABLET BY MOUTH DAILY 90 tablet 1    warfarin (COUMADIN) 5 MG tablet Take 5mg by mouth daily EXCEPT 7.5mg every Monday, Wednesday and Friday or as directed by [facility] [phone] tablet 5    naloxone 4 MG/0.1ML LIQD nasal spray 1 spray by Nasal route as needed for Opioid Reversal 1 each 5    [START ON 3/9/2020] oxyCODONE-acetaminophen (PERCOCET) 7.5-325 MG per tablet Take 1 tablet by mouth every 8 hours as needed for Pain for up to 30 days. 90 tablet 0    albuterol sulfate HFA (PROAIR HFA) 108 (90 Base) MCG/ACT inhaler Inhale 2 puffs into the lungs every 6 hours as needed for Wheezing 1 Inhaler 5     No current facility-administered medications for this visit.           Social History     Socioeconomic History    Marital status:      Spouse name: Not on file    Number of children: 3    Years of education: Not on file    Highest education level: Not on file   Occupational History    Not on file   Social Needs    Financial resource strain: Not on file    Food insecurity     Worry: Not on file     Inability: Not on file   [employer] needs     Medical: Not on file     Non-medical: Not on file   Tobacco Use    Smoking status: Former Smoker     Packs/day: 1.00     Years: 20.00     Pack years: 20.00     Types: Cigarettes     Last attempt to quit: 1980     Years since quittin.2    Smokeless tobacco: Never Used   Substance and Sexual Activity    Alcohol use: No     Alcohol/week: 0.0 standard drinks    Drug use: No    Sexual activity: Not Currently   Lifestyle    Physical activity     Days per week: Not on file     Minutes per session: Not on file    Stress: Not on file   Relationships    Social connections     Talks on phone: Not on file     Gets together: Not on file     Attends [employer] service: Not on file     Active member of club or organization: Not on file     Attends meetings of clubs or organizations: Not on file     Relationship status: Not on file    Intimate partner violence     Fear of current or ex partner: Not on file     Emotionally abused: Not on file     Physically abused: Not on file     Forced sexual activity: Not on file   Other Topics Concern    Not on file   Social History Narrative    Not on file      Past Medical History:   Diagnosis Date    A fib     Arthritis     COPD (chronic through the night ). Negative for dysuria and vaginal bleeding. Musculoskeletal: Positive for back pain and gait problem. No falls    Skin:        MOle on her back    Neurological: Positive for numbness (chronic numbnes in both feet and left leg from last fall withi fractured hips ). Negative for dizziness, syncope, speech difficulty, weakness, light-headedness and headaches. Psychiatric/Behavioral: Negative for dysphoric mood and sleep disturbance. \"looking forward to getting it done and over with\"  Does not want to be a burden and does not want to go into the NH again        OBJECTIVE:  BP (!) 156/74   Pulse 95   Temp 97.9 °F (36.6 °C) (Oral)   Resp 16   Ht 4' 11.75\" (1.518 m) Comment: WITHOUT SHOES  Wt 192 lb (87.1 kg)   LMP  (LMP Unknown)   SpO2 95%   BMI 37.81 kg/m²      Body mass index is 37.81 kg/m². Physical Exam  Vitals signs reviewed. Constitutional:       General: She is not in acute distress. Appearance: Normal appearance. She is well-developed. She is obese. HENT:      Right Ear: Tympanic membrane and ear canal normal.      Left Ear: Tympanic membrane and ear canal normal.      Nose: Nose normal.      Mouth/Throat:      Pharynx: Oropharynx is clear. Eyes:      Conjunctiva/sclera: Conjunctivae normal.   Neck:      Musculoskeletal: Neck supple. Thyroid: No thyromegaly. Vascular: No carotid bruit. Comments: No tm  Cardiovascular:      Rate and Rhythm: Normal rate. Rhythm irregular. Pulses: Normal pulses. Heart sounds: Murmur present. Pulmonary:      Effort: Pulmonary effort is normal.      Breath sounds: Normal breath sounds. Abdominal:      General: Bowel sounds are normal. There is no distension. Palpations: Abdomen is soft. Tenderness: There is no abdominal tenderness. Comments: No hsm    Musculoskeletal:         General: Deformity (OA changes of hands) present. No swelling.    Lymphadenopathy:      Cervical: No cervical months (around 5/25/2020), or if symptoms worsen or fail to improve. Patient Instructions   Please call your pharmacy if you need any refills of your medication(s). Please call our office at [phone] if you don't hear from us about your test results. Bring an accurate list of your medications with you at every appointment to ensure that we have the correct information.     Our office hours are: Monday - Friday 8:30 am- 5 pm    Phone lines turn on at 8:30 am

## 2020-03-06 NOTE — PATIENT INSTRUCTIONS
Patient Education     Continue home exercises  Limit use of Percocet for severe pain    Chronic Pain: Care Instructions  Your Care Instructions    Chronic pain is pain that lasts a long time (months or even years) and may or may not have a clear cause. It is different from acute pain, which usually does have a clear cause--like an injury or illness--and gets better over time. Chronic pain:  · Lasts over time but may vary from day to day. · Does not go away despite efforts to end it. · May disrupt your sleep and lead to fatigue. · May cause depression or anxiety. · May make your muscles tense, causing more pain. · Can disrupt your work, hobbies, home life, and relationships with friends and family. Chronic pain is a very real condition. It is not just in your head. Treatment can help and usually includes several methods used together, such as medicines, physical therapy, exercise, and other treatments. Learning how to relax and changing negative thought patterns can also help you cope. Chronic pain is complex. Taking an active role in your treatment will help you better manage your pain. Tell your doctor if you have trouble dealing with your pain. You may have to try several things before you find what works best for you. Follow-up care is a key part of your treatment and safety. Be sure to make and go to all appointments, and call your doctor if you are having problems. It's also a good idea to know your test results and keep a list of the medicines you take. How can you care for yourself at home? · Pace yourself. Break up large jobs into smaller tasks. Save harder tasks for days when you have less pain, or go back and forth between hard tasks and easier ones. Take rest breaks. · Relax, and reduce stress. Relaxation techniques such as deep breathing or meditation can help. · Keep moving. Gentle, daily exercise can help reduce pain over the long run.  Try low- or no-impact exercises such as walking, swimming, and stationary biking. Do stretches to stay flexible. · Try heat, cold packs, and massage. · Get enough sleep. Chronic pain can make you tired and drain your energy. Talk with your doctor if you have trouble sleeping because of pain. · Think positive. Your thoughts can affect your pain level. Do things that you enjoy to distract yourself when you have pain instead of focusing on the pain. See a movie, read a book, listen to music, or spend time with a friend. · If you think you are depressed, talk to your doctor about treatment. · Keep a daily pain diary. Record how your moods, thoughts, sleep patterns, activities, and medicine affect your pain. You may find that your pain is worse during or after certain activities or when you are feeling a certain emotion. Having a record of your pain can help you and your doctor find the best ways to treat your pain. · Take pain medicines exactly as directed. ? If the doctor gave you a prescription medicine for pain, take it as prescribed. ? If you are not taking a prescription pain medicine, ask your doctor if you can take an over-the-counter medicine. Reducing constipation caused by pain medicine  · Include fruits, vegetables, beans, and whole grains in your diet each day. These foods are high in fiber. · Drink plenty of fluids, enough so that your urine is light yellow or clear like water. If you have kidney, heart, or liver disease and have to limit fluids, talk with your doctor before you increase the amount of fluids you drink. · If your doctor recommends it, get more exercise. Walking is a good choice. Bit by bit, increase the amount you walk every day. Try for at least 30 minutes on most days of the week. · Schedule time each day for a bowel movement. A daily routine may help. Take your time and do not strain when having a bowel movement. When should you call for help?   Call your doctor now or seek immediate medical care if:    · Your pain gets

## 2020-03-06 NOTE — PROGRESS NOTES
status post Gamma nail and cables.  RI ESOPHAGOGASTRODUODENOSCOPY US SCOPE W/ADJ STRXRS N/A 11/1/2018    ESOPHAGEAL ENDOSCOPIC ULTRASOUND EXAM performed by Will Restrepo MD at [facility]       right     UPPER GASTROINTESTINAL ENDOSCOPY N/A 11/1/2018    EGD BIOPSY performed by Will Restrepo MD at [place] [address]   Allergen Reactions    Levaquin [Levofloxacin In D5w] Nausea And Vomiting    Digoxin Other (See Comments)     Pt states had to go er due to it affected her heart in a bad way-can't recall what type of reaction       Current Outpatient Medications   Medication Sig Dispense Refill    [START ON 3/9/2020] oxyCODONE-acetaminophen (PERCOCET) 7.5-325 MG per tablet Take 1 tablet by mouth every 8 hours as needed for Pain for up to 30 days. 90 tablet 0    lisinopril (PRINIVIL;ZESTRIL) 20 MG tablet Take 1 tablet by mouth 2 times daily 180 tablet 1    carvedilol (COREG) 6.25 MG tablet Take 1 tablet by mouth 2 times daily 180 tablet 1    pantoprazole (PROTONIX) 20 MG tablet Take 1 tablet by mouth every morning (before breakfast) 90 tablet 1    ondansetron (ZOFRAN) 4 MG tablet Take 1 tablet by mouth 3 times daily as needed for Nausea or Vomiting 30 tablet 2    amLODIPine (NORVASC) 5 MG tablet TAKE ONE TABLET BY MOUTH DAILY 90 tablet 1    albuterol sulfate HFA (PROAIR HFA) 108 (90 Base) MCG/ACT inhaler Inhale 2 puffs into the lungs every 6 hours as needed for Wheezing 1 Inhaler 5    warfarin (COUMADIN) 5 MG tablet Take 5mg by mouth daily EXCEPT 7.5mg every Monday, Wednesday and Friday or as directed by [facility] [phone] tablet 5    naloxone 4 MG/0.1ML LIQD nasal spray 1 spray by Nasal route as needed for Opioid Reversal 1 each 5     No current facility-administered medications for this visit.         Family History   Problem Relation Age of Onset    Stroke Mother     COPD Father     Coronary Art Dis Father     Other Brother tenderness. Abdominal:      General: Bowel sounds are normal.      Palpations: Abdomen is soft. There is no mass. Tenderness: There is no abdominal tenderness. There is no guarding. Musculoskeletal: Normal range of motion. General: No tenderness or deformity. Comments: Deformity of hands due to OA  Mild lumbar paraspinal tenderness  Mild tenderness in knees, no swelling; ROM grossly intact   Lymphadenopathy:      Cervical: No cervical adenopathy. Skin:     General: Skin is warm. Findings: No rash. Neurological:      Mental Status: She is alert and oriented to person, place, and time. Cranial Nerves: No cranial nerve deficit. Sensory: No sensory deficit. Motor: No abnormal muscle tone. Coordination: Coordination normal.      Gait: Gait abnormal (antalgic). Deep Tendon Reflexes: Reflexes normal. Babinski sign absent on the right side. Babinski sign absent on the left side. Reflex Scores:       Tricep reflexes are 1+ on the right side and 1+ on the left side. Bicep reflexes are 1+ on the right side and 1+ on the left side. Brachioradialis reflexes are 1+ on the right side and 1+ on the left side. Patellar reflexes are 1+ on the right side and 1+ on the left side. Achilles reflexes are 1+ on the right side and 1+ on the left side. Psychiatric:         Behavior: Behavior normal.         Thought Content: Thought content normal.         Vitals:    03/06/20 1508   BP: 128/68   Site: Left Upper Arm   Position: Sitting   Cuff Size: Large Adult   Weight: 192 lb (87.1 kg)   Height: 4' 11\" (1.499 m)       Body mass index is 38.78 kg/m². Pain Score:   9 /10    Goals of chronic pain therapy:      Multi-disciplinary comprehensive pain management with functional improvement and reduced opioid use.      Prescription pain medication monitoring:      MEDD current = 33.75   ORT Score = 0   LOW     Other Risk factors - CAD, COPD, gastric ulcer, advanced age.       Date of Last Medication Agreement: 01/24/2020   Date Naloxone prescribed: NA     UDT:    Date of last UDT: 01/24/2020 (oral fluid)    Adverse report: No     OARRS:    Checked today: Yes    Adverse report: No     Medication Effects -        Analgesia:      Average level of pain for the past month = 8/10     Percentage of pain relief = 80% to 95%     Activities Improved: Activities of daily living = 30%     Sleep = 30%     Mood = 30%     Social functioning = 30%     Adverse Effects: Any adverse effects: Yes     Type/Severity of side effect: constipation (on laxative)    Aberrant Behavior:     Requests for frequent early refills: No     Increased dose without authorization: No     Reports lost or stolen prescriptions: No     Attempts to obtain prescriptions from other providers: No     Use of pain medication in response to situational stressor: No      Increasingly unkempt or impaired: No     Negative mood changes: No     Abusing alcohol or illicit drugs: No     Appears intoxicated: No     Other: NA     Controlled Substances Monitoring:    Periodic Controlled Substance Monitoring: Possible medication side effects, risk of tolerance/dependence & alternative treatments discussed. , Assessed functional status. Ben Javier MD)    - Informed patient that opioid pain medications may not be phoned into the pharmacy or refilled without being seen. Overall Progress:       PEG Score = 9 / 10     Physical Therapy: N/A   Counseling: N/A   Injections: N/A - declines     Patient compliance on plan of care: Fair   Progress on current plan:  Fair    Assessment:      ICD-10-CM    1. Polyarthropathy, multiple sites M13.0 oxyCODONE-acetaminophen (PERCOCET) 7.5-325 MG per tablet   2. 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 Chronic pain syndrome G89.4    4. Chronic, continuous use of opioids F11.90    5.  Pain medication agreement

## 2020-03-06 NOTE — PROGRESS NOTES
Pain Medication Review -    Last Pain Medication Agreement date: 1/24/20     Last UDT date: 1/29/20    Expected Opioid pill count: 6     Actual Opiod pill count: 24    Notes -

## 2020-03-09 NOTE — ED TRIAGE NOTES
Cough for last two days, pt reports coughing up thick yellow mucous. Pt reports shortness of breath since yesterday. Pt very wheezy, states that yesterday she felt some chest pain but none today. Pt denies N/V/D, states that she felt feverish but did not take her temp, she is currently afebrile.

## 2020-03-09 NOTE — ED PROVIDER NOTES
cooperative, appears stated age. Head:  Normocephalic, without obvious abnormality, atraumatic. Eyes:  conjunctiva/corneas clear, EOM's intact. Sclera anicteric. ENT:  Mucous remains moist and pink   Neck: Supple, symmetrical, trachea midline, no adenopathy. No jugular venous distention. Lungs:    Bilateral expiratory wheezes with good breath sounds bilaterally. Chest Wall:   No pain to palpation   Heart:   Regular rate rhythm with no murmurs rubs gallops   Abdomen:    Soft and benign with no guarding rebound   Extremities:  No clubbing cyanosis or edema   Pulses:  Good throughout   Skin:  No rashes or lesions to exposed skin. Neurologic: Alert and oriented X 3. DIAGNOSTIC RESULTS     EKG: All EKG's are interpreted by the Emergency Department Physician who either signs or Co-signsthis chart in the absence of a cardiologist.    Chronic atrial fibrillation at a rate of 97 beats a minute with no acute ST elevations or depressions or pathologic Q waves. Normal axis. RADIOLOGY:   Non-plain filmimages such as CT, Ultrasound and MRI are read by the radiologist. Plain radiographic images are visualized and preliminarily interpreted by the emergency physician with the below findings:    See below    Interpretation per the Radiologist below, if available at the time ofthis note: All incidental findings were discussed with the patient. XR CHEST STANDARD (2 VW)   Final Result   No acute process.                ED BEDSIDE ULTRASOUND:   Performed by ED Physician - none    LABS:  Labs Reviewed   CBC WITH AUTO DIFFERENTIAL - Abnormal; Notable for the following components:       Result Value    Neutrophils Absolute 8.0 (*)     All other components within normal limits    Narrative:     Performed at:  [facility]) - [place]  [address] [facility]   Phone [phone]   COMPREHENSIVE METABOLIC PANEL W/ REFLEX TO MG FOR LOW K - Abnormal; Notable for the following components:    Chloride 97 (*)     Glucose 209 (*)     CREATININE <0.5 (*)     All other components within normal limits    Narrative:     Performed at:  [facility]) [facility]  [address] [facility],  [facility]   Phone [phone]   RAPID INFLUENZA A/B ANTIGENS    Narrative:     Performed at:  [address]  [address],  [facility]   Phone [phone]   TROPONIN    Narrative:     Performed at:  [address]  [address] [facility],  [facility]   Phone [phone]       All other labs were within normal range or not returned as of this dictation. EMERGENCY DEPARTMENT COURSE and DIFFERENTIAL DIAGNOSIS/MDM:   Vitals:    Vitals:    03/09/20 1515 03/09/20 1530 03/09/20 1600 03/09/20 1716   BP: (!) 174/91  (!) 174/73 (!) 193/85   Pulse: 108 119 100 95   Resp: 21 17 24    Temp:       TempSrc:       SpO2:  94% 96%    Weight:       Height:               MDM    The patient has remained stable throughout her hospital course. She is in chronic atrial fibrillation. Her chest x-ray shows no acute findings. Her work-up was otherwise unremarkable. She received some albuterol and Atrovent treatments for her wheezing with good results. She received Solu-Medrol as well. Her blood sugar was slightly elevated today. On reexamination the patient feels much better. She will be discharged with instructions to follow back up with her primary care physician the neck several days and return if worse. Of note I did check her influenza a and B that were negative. REASSESSMENT              CONSULTS:  None    PROCEDURES:  Unless otherwise noted below, none     Procedures    FINAL IMPRESSION      1. Bronchitis    2.  Chronic obstructive pulmonary disease, unspecified COPD type [facility])          DISPOSITION/PLAN   DISPOSITION Decision To Discharge 03/09/2020 05:20:20 PM      PATIENT REFERREDTO:  Ana Ramos, [address] [place] [address]  [phone]    Call in 2 days  See your doctor in 2 days for recheck. DISCHARGEMEDICATIONS:  New Prescriptions    PREDNISONE (DELTASONE) 20 MG TABLET    Take 3 tablets by mouth daily for 5 days     Controlled Substances Monitoring:     RX Monitoring 3/6/2020   Attestation -   Acute Pain Prescriptions -   Periodic Controlled Substance Monitoring Possible medication side effects, risk of tolerance/dependence & alternative treatments discussed. ;Assessed functional status. Chronic Pain > 50 MEDD Obtained or confirmed \"Consent for Opioid Use\" on file.    Chronic Pain > 80 MEDD -       (Please note that portions of this note were completed with a voice recognition program.  Efforts were made to edit the dictations but occasionally words are mis-transcribed.)    Saturnino Muniz MD (electronically signed)  Attending Emergency Physician          Saturnino Muniz MD  03/09/20 9410

## 2020-03-09 NOTE — ED NOTES
Pt up to bathroom and while in there was vomiting. MD notified.      Jose Guerrero, RN  03/09/20 0255

## 2020-03-09 NOTE — ED NOTES
MD aware of pt's blood pressure. Pt stated that she usually takes her bp meds by now but has not had them yet.       [address], RN  03/09/20 4397

## 2020-03-26 NOTE — TELEPHONE ENCOUNTER
Spoke with patient. She will not be coming into the clinic tomorrow. She states she has been sick for the last three weeks and will not come in. Expressed the importance of getting her INR checked. She sates to call her back in two weeks. She went to the ED a few weeks ago and the MD gave her prednisone and an inhaler.  (she did not call office to inform us of the new medication). Scheduled her for two week call. Patient verbalized the importance of getting her INRs but refused at this time.      CLINICAL PHARMACY CONSULT: MED RECONCILIATION/REVIEW ADDENDUM    For Pharmacy Admin Tracking Only    PHSO: Yes  Total # of Interventions Recommended: 0  Total Interventions Accepted: 0  Time Spent (min): [address], [facility], [place] 3/26/2020  1:52 PM

## 2020-04-06 NOTE — PROGRESS NOTES
joints and back due to degenerative disease  2. Intolerant of NSAIDs due to anticoagulant therapy and advanced age. 3. Reports pain medication helps with ADL; constipation controlled on senekot. 4. Counseled on limitation of opioid; will send Percocet to Pharmacy due to [address] outbreak. Analgesic Plan:   Continue present regimen: Yes Percocet 7.5 mg TID PRN   Adjust dose of present analgesic: No   Switch analgesics: No   Add/Adjust concomitant therapy: No    Follow up:    RTC X 1 month     Documentation:    Details of this discussion including any medical advice provided: as above. I affirm this is a Patient Initiated Episode with an Established Patient who has not had a related appointment within my department in the past 7 days or scheduled within the next 24 hours. Total Time: minutes: 5-10 minutes    This visit was completed VIRTUALLY using Phone encounter.                Daisy Dhaliwal MD  Board Certified in Anesthesiology and Pain Medicine

## 2020-04-22 NOTE — TELEPHONE ENCOUNTER
Jorge Luis Fitch was contacted to set up a video visit with Ottoniel Suggs MD.     Mayra Hillenne with: Patient    Patient encouraged to sign up for [employer] in order to complete Virtual Visits, if MyChart status was not already active. Patient not agreeableto sign up for a Virtual Visit with PCP within the next week. Reason for declining VV with PCP in the next week: Other - Patient states that shes does not wish to have a VV. She will call back to schedule appointment.        Patricia Jain

## 2020-05-05 NOTE — PROGRESS NOTES
[address] [facility] [address], [place], [address]  [phone] (Q)  [phone] (f)        5/5/20      Nelson Delgado is a 80 y.o. female evaluated via TELEPHONE on 5/5/2020 using HIPPA compliant software (Epic/Perfect Serve). Patient declined video visit due to comm issues at his/her end. Consent:  She and/or health care decision maker is aware that that she may receive a bill for this telephone service, depending on her insurance coverage, and has provided verbal consent to proceed: Yes    Chief Complaint   Patient presents with    Joint Pain     virtual visit due to [address] outbreak         Reason for call: low back and joint pain    Any changes since last visit:   Pain constant in multiple joints and lower back, achy sharp without radiation or weakness. Pain worse with daily activities, changes in weather and helped by current medication. Reports she is stuck at home due to high risk for CoVID19 - requesting med refill. Past Medical History:   Diagnosis Date    A fib     Arthritis     COPD (chronic obstructive pulmonary disease) (HCC)     Coronary artery disease     Femur fracture ([facility] [address] 01/2016    left    Fibromyalgia     GERD (gastroesophageal reflux disease)     History of shingles     Hypertension     PONV (postoperative nausea and vomiting)     Rotator cuff disorder     left     Ulcer of gastroesophageal junction 2006       Past Surgical History:   Procedure Laterality Date    ACROMIOPLASTY Left 4/27/15    Dr Dee Dee Pretty  age 21   [address] [facility] COLONOSCOPY  05.01.03    COLONOSCOPY  02/2017    no need for further colonoscopy per Dr. Tommie Barrera 11/1/2018    Renee Sachin performed by Kelly Fowler MD at [facility] Left 01/2016     Left intertrochanteric and shaft femur comminuted fracture, status post Gamma nail and cables. 01/24/2020    Adverse report: No     OARRS:    Checked today: Yes    Adverse report: No     Medication Effects -        Analgesia:      Average level of pain for the past month = 7/10     Percentage of pain relief = 75%     Activities Improved: Activities of daily living = 30%     Adverse Effects: Any adverse effects: No     Type/Severity of side effect: None    Aberrant Behavior:     Any aberrant behavior: No  If yes, describe: None     Controlled Substances Monitoring:    Periodic Controlled Substance Monitoring: Possible medication side effects, risk of tolerance/dependence & alternative treatments discussed. , Assessed functional status., Obtaining appropriate analgesic effect of treatment. Lindsey Macdonald MD)    - Informed patient that opioid pain medications may not be phoned into the pharmacy or refilled without being seen. Assessment:      ICD-10-CM    1. Polyarthropathy, multiple sites M13.0 oxyCODONE-acetaminophen (PERCOCET) 7.5-325 MG per tablet   2. 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 Chronic, continuous use of opioids F11.90    4. Pain medication agreement Z02.89 oxyCODONE-acetaminophen (PERCOCET) 7.5-325 MG per tablet       Plan:     1. Chronic joint and low back pain due to degenerative disease; no focal changes. 2. Continues to be on anticoagulant therapy and reports good benefit from Percocet PRN. 3. Still unable to get out due to [address]; reports medications helping without major side effects. 4. Counseled on opioid and refilled Percocet 7.5 mg TID PRN - sent to pharmacy due to [address] outbreak. Analgesic Plan:   Continue present regimen: Yes   Adjust dose of present analgesic: No   Switch analgesics: No   Add/Adjust concomitant therapy: No    Follow up:    RTC X 1 month     Documentation:    Details of this discussion including any medical advice provided: as above.     I affirm this

## 2020-05-12 NOTE — TELEPHONE ENCOUNTER
Medication Refill    Medication needing refilled: amLODIPine (NORVASC)       Doseage of the medication: 5 MG tablet     How are you taking this medication (QD, BID, TID, QID, PRN): TAKE ONE TABLET BY MOUTH DAILY    30 or 90 day supply called in: asking for 90 days    Which Pharmacy are we sending the medication to?:  [address], [address] [phone] - F [phone]      PCP use to fill this but patient does not she her any more

## 2020-05-28 NOTE — PROGRESS NOTES
[address]      Cardiology Follow Up    Enzo Scott  1935    May 29, 2020    CC: \"I have been better. \"    HPI:  The patient is 80 y.o. female with a past medical history significant for CAD, atrial fibrillation s/p Cardioversion in 2005 ([facility]), hypertension, COPD, GERD and arthritis. She was in the hospital in January for hip surgery s/p hip fracture. She was residing in a NH and then in 4/2016 noticed some shortness of breath with a productive cough. She admitted and treated for pneumonia and developed Afib RVR while admitted and was treated with Cardizem gtt. Today, she has complaints of increased swelling at times. She does have shortness of breath. She is unsure if she is taking her medications the way the she is prescribed. Past Medical History:   Diagnosis Date    A fib     Arthritis     COPD (chronic obstructive pulmonary disease) (HCC)     Coronary artery disease     Femur fracture ([address] 01/2016    left    Fibromyalgia     GERD (gastroesophageal reflux disease)     History of shingles     Hypertension     PONV (postoperative nausea and vomiting)     Rotator cuff disorder     left     Ulcer of gastroesophageal junction 2006     Past Surgical History:   Procedure Laterality Date    ACROMIOPLASTY Left 4/27/15    Dr Marc Hernandez  age 21   [address] [facility] COLONOSCOPY  05.01.03    COLONOSCOPY  02/2017    no need for further colonoscopy per Dr. Annie Mcnally 11/1/2018    Lynda Hurtado performed by Sabra Coats MD at [facility] Left 01/2016     Left intertrochanteric and shaft femur comminuted fracture, status post Gamma nail and cables.     GA ESOPHAGOGASTRODUODENOSCOPY US SCOPE W/ADJ STRXRS N/A 11/1/2018    ESOPHAGEAL ENDOSCOPIC ULTRASOUND EXAM performed by aSbra Coats MD at [address]      right     UPPER GASTROINTESTINAL ENDOSCOPY N/A 2018    EGD BIOPSY performed by Sukhdev Kincaid MD at [address] History   Problem Relation Age of Onset    Stroke Mother     COPD Father     Coronary Art Dis Father     Other Brother         AAA rupture    Other Brother         cerebral hemorrhage    Heart Attack Son         cerebral hemorrhage as well    Cancer Sister         x2 sisters     Cancer Sister         ovarian      Social History     Tobacco Use    Smoking status: Former Smoker     Packs/day: 1.00     Years: 20.00     Pack years: 20.00     Types: Cigarettes     Last attempt to quit: 1980     Years since quittin.4    Smokeless tobacco: Never Used   Substance Use Topics    Alcohol use: No     Alcohol/week: 0.0 standard drinks    Drug use: No       Allergies   Allergen Reactions    Levaquin [Levofloxacin In D5w] Nausea And Vomiting    Digoxin Other (See Comments)     Pt states had to go er due to it affected her heart in a bad way-can't recall what type of reaction     Current Outpatient Medications   Medication Sig Dispense Refill    amLODIPine (NORVASC) 5 MG tablet TAKE ONE TABLET BY MOUTH DAILY 90 tablet 1    oxyCODONE-acetaminophen (PERCOCET) 7.5-325 MG per tablet Take 1 tablet by mouth every 8 hours as needed for Pain for up to 30 days.  90 tablet 0    albuterol sulfate HFA (PROAIR HFA) 108 (90 Base) MCG/ACT inhaler Inhale 2 puffs into the lungs every 6 hours as needed for Wheezing 1 Inhaler 5    lisinopril (PRINIVIL;ZESTRIL) 20 MG tablet Take 1 tablet by mouth 2 times daily 180 tablet 1    carvedilol (COREG) 6.25 MG tablet Take 1 tablet by mouth 2 times daily 180 tablet 1    ondansetron (ZOFRAN) 4 MG tablet Take 1 tablet by mouth 3 times daily as needed for Nausea or Vomiting 30 tablet 2    warfarin (COUMADIN) 5 MG tablet Take 5mg by mouth daily EXCEPT 7.5mg every Monday, Wednesday and Friday or as directed by [employer] [facility] Coumadin Service [phone] tablet 5    naloxone 4 MG/0.1ML LIQD nasal spray 1 spray by Nasal route as needed for Opioid Reversal 1 each 5     No current facility-administered medications for this visit. Review of Systems:  Review of systems is as detailed above and all other symptoms are negative. Physical Exam:   BP (!) 152/78   Pulse 88   Temp 98.1 °F (36.7 °C)   Ht 5' 3\" (1.6 m)   Wt 197 lb (89.4 kg) Comment: with shoes  LMP  (LMP Unknown)   SpO2 95%   BMI 34.90 kg/m²   Wt Readings from Last 3 Encounters:   05/29/20 197 lb (89.4 kg)   03/09/20 197 lb 5 oz (89.5 kg)   03/06/20 192 lb (87.1 kg)     Constitutional: The patient is oriented to person, place, and time. Appears well-developed and well-nourished. In no acute distress. Head: Normocephalic and atraumatic. Pupils equal and round. Neck: Neck supple. No JVP or carotid bruit appreciated. No mass and no thyromegaly present. No lymphadenopathy present. Cardiovascular: Irregularly irregular Normal heart sounds. Exam reveals no gallop and no friction rub. No murmur heard. Pulmonary/Chest: Effort normal and breath sounds normal. No respiratory distress. Wheezes and scattered crackles heard on exam  Abdominal: Soft, non-tender. Bowel sounds are normal. Exhibits no organomegaly, mass or bruit. Extremities: Trace edema. No cyanosis or clubbing. Pulses are 2+ radial and carotid bilaterally. Neurological: No gross cranial nerve deficit. Coordination normal.   Skin: Skin is warm and dry. There is no rash or diaphoresis. Psychiatric: Patient has a normal mood and affect.  Speech is normal and behavior is normal.   Lab Review:   FLP:    Lab Results   Component Value Date    TRIG 129 06/27/2019    HDL 54 06/27/2019    HDL 47 03/22/2012    LDLCALC 73 06/27/2019    LABVLDL 26 06/27/2019     BUN/Creatinine:    Lab Results   Component Value Date    BUN 11 03/09/2020    CREATININE <0.5 03/09/2020     EKG Interpretation: 3/13/18 Afib-CVR     8/24/18 Afib- controlled rate     5/29/2020 Atrial

## 2020-05-29 NOTE — PATIENT INSTRUCTIONS
yourself. Call your doctor now or seek immediate medical care if:  · Your shortness of breath gets worse or you start to wheeze. Wheezing is a high-pitched sound when you breathe. · You wake up at night out of breath or have to prop your head up on several pillows to breathe. · You are short of breath after only light activity or while at rest.  Watch closely for changes in your health, and be sure to contact your doctor if:  · You do not get better over the next 1 to 2 days. Where can you learn more? Go to [email or web address]://[email or web address]. org and sign in to your [employer] account. Enter S780 in the [employer] box to learn more about \"Shortness of Breath: Care Instructions. \"     If you do not have an account, please click on the \"Sign Up Now\" link. Current as of: February 24, 2020               Content Version: 12.5  © 0171-7894 [employer]wise, Incorporated. Care instructions adapted under license by [facility] [place] ([facility]). If you have questions about a medical condition or this instruction, always ask your healthcare professional. [place] [address] any warranty or liability for your use of this information.

## 2020-06-02 NOTE — PROGRESS NOTES
Antione Atkins is a 80 y.o. female evaluated via telephone on 6/2/2020. Consent:  She and/or health care decision maker is aware that that she may receive a bill for this telephone service, depending on her insurance coverage, and has provided verbal consent to proceed: Yes      Documentation:  I communicated with the patient and/or health care decision maker about see note below. Details of this discussion including any medical advice provided: see note below      I affirm this is a Patient Initiated Episode with a Patient who has not had a related appointment within my department in the past 7 days or scheduled within the next 24 hours. Patient identification was verified at the start of the visit: Yes    Total Time: minutes: 11-20 minutes    Note: not billable if this call serves to triage the patient into an appointment for the relevant concern      [address]:  Antione Atkins is a 80 y.o. female being evaluated for:    Chief Complaint   Patient presents with    Knee Pain     patient started 4-5 days ago with Pain and Swelling in both Knees and down the legs. Started taking a water pill Lasix 40 mg one daily along with Potassium 20 meq one daily about 3 days ago. Had a visit with Dr Geena Schaefer on fri 5/29 and he order testing and labs on patient, not set up yet.        HPI   Having so much pain in her legs from her knees and down  Swelling is better in the right foot  Left ankle is swollen  Used wraps and biofreeze and salan pas  Up every hour due to urinary frequency   That just started since put on lasix  NO dyuria      Allergies   Allergen Reactions    Levaquin [Levofloxacin In D5w] Nausea And Vomiting    Digoxin Other (See Comments)     Pt states had to go er due to it affected her heart in a bad way-can't recall what type of reaction     Current Outpatient Medications   Medication Sig Dispense Refill    diclofenac sodium (VOLTAREN) 1 % GEL Apply 2 g topically 4 times daily 1 Tube

## 2020-06-04 NOTE — PROGRESS NOTES
Ms. Conor Carballo is a 80 y.o.  female with history of A-Fib with RVR. Ms. Conor Carballo had an INR test today. Results were reviewed and appropriate warfarin management was completed. THIS VISIT WAS COMPLETED AS:   [x]   A VIRTUAL VISIT VIA TELEPHONE IN EFFORTS TO REDUCE THE SPREAD OF COVID-19.  []   A DRIVE-THRU VISIT IN EFFORTS TO REDUCE THE SPREAD OF COVID-19.  []   AN IN PERSON VISIT. PROTOCOLS WERE FOLLOWED WITH PRECAUTIONS TO REDUCE THE SPREAD OF COVID-19. Patient verifies current dosing regimen  Patient denies s/s bleeding/bruising/swelling/SOB  No blood in urine or stool. No dietary changes. No changes in medication/OTC agents/Herbals. No change in alcohol use. No missed doses. No Procedures scheduled in the future at this time. Lab Results   Component Value Date    INR 1.42 (H) 06/03/2020    INR 2.70 (H) 02/11/2020    INR 2.7 11/15/2019     Patient Status:  Patient states not doing well due to ongoing medical conditions. Having a difficult time walking. No new mediations. She missed two warfarin doses because she was not feeling well. Took warfarin 10 mg on 6-3-20. Instructed patient to continue routine warfarin dosing. Warfarin dosing:   CONTINUE:  Warfarin 7.5 mg daily except 5 mg on Mondays, Wednesday and Fridays.        Next INR: 4 weeks-[address] [place]      Medications reviewed and updated on home medication list: No:   Warfarin dose updated on patient home medication list: No:        CLINICAL PHARMACY CONSULT: MED RECONCILIATION/REVIEW ADDENDUM    For Pharmacy Admin Tracking Only    PHSO: Yes  Total # of Interventions Recommended: 1      Total Interventions Accepted: 1  Time Spent (min): [address], RPh, PRS 6/4/2020  3:07 PM

## 2020-06-09 NOTE — PROGRESS NOTES
Sister         ovarian        Past Surgical History:   Procedure Laterality Date    ACROMIOPLASTY Left 4/27/15    Dr Ela Minor  age 21   [address] COLONOSCOPY  03    COLONOSCOPY  2017    no need for further colonoscopy per Dr. Madelaine Amaya 2018    Alejandra Bar performed by Britney Valdez MD at [address] Left 2016     Left intertrochanteric and shaft femur comminuted fracture, status post Gamma nail and cables.  AR ESOPHAGOGASTRODUODENOSCOPY US SCOPE W/ADJ STRXRS N/A 2018    ESOPHAGEAL ENDOSCOPIC ULTRASOUND EXAM performed by Britney Valdez MD at [address]       right     UPPER GASTROINTESTINAL ENDOSCOPY N/A 2018    EGD BIOPSY performed by Britney Valdez MD at [address] Not on file   Tobacco Use    Smoking status: Former Smoker     Packs/day: 1.00     Years: 20.00     Pack years: 20.00     Types: Cigarettes     Last attempt to quit: 1980     Years since quittin.4    Smokeless tobacco: Never Used   Substance and Sexual Activity    Alcohol use: No     Alcohol/week: 0.0 standard drinks    Drug use: No    Sexual activity: Not Currently       Current Outpatient Medications   Medication Sig Dispense Refill    oxyCODONE-acetaminophen (PERCOCET) 7.5-325 MG per tablet Take 1 tablet by mouth every 8 hours as needed for Pain for up to 30 days.  90 tablet 0    diclofenac sodium (VOLTAREN) 1 % GEL Apply 2 g topically 4 times daily 1 Tube 5    ondansetron (ZOFRAN) 4 MG tablet Take 1 tablet by mouth 3 times daily as needed for Nausea or Vomiting 30 tablet 2    furosemide (LASIX) 40 MG tablet Take 40 mg by mouth daily      potassium citrate (UROCIT-K) 10 MEQ (1080 MG) extended release tablet Take 20 mEq by mouth daily       lisinopril (PRINIVIL;ZESTRIL) 20 MG tablet Take 1 2 weeks. Call or return to clinic prn if these symptoms worsen or fail to improve as anticipated.

## 2020-07-02 NOTE — TELEPHONE ENCOUNTER
OARRS reviewed -   Percocet filled  Sent by e-script due to continued lock down of our office for [address] outbreak.      See my note 06/03/20 -  Needs F/U next month

## 2020-08-03 NOTE — PROGRESS NOTES
[address] [facility] [address], [place], [address]  [phone] (P)  [phone] (f)        8/3/20      Emma Monzon is a 80 y.o. female evaluated via TELEPHONE on 8/3/2020 using HIPPA compliant software (Epic/Perfect Serve). Patient declined video visit due to comm issues at his/her end. Consent:  She and/or health care decision maker is aware that that she may receive a bill for this telephone service, depending on her insurance coverage, and has provided verbal consent to proceed: Yes  Due to nature of the virtual visit, evaluation of organ systems was limited to presenting complaints. Chief Complaint   Patient presents with    Joint Pain     virtual visit due to [employer]19 lock down         Reason for call: joint pain    Any changes since last visit:   Pain constant in multiple joints, achy sharp episodically worse without radiation or weakness. Pain worse with changes in weather and activities and helped by pain medication. Stable on current medication - needs refill.       Past Medical History:   Diagnosis Date    A fib     Arthritis     [facility] obstructive pulmonary disease) ([facility])     Coronary artery disease     Femur fracture ([facility] [address] 01/2016    left    Fibromyalgia     GERD (gastroesophageal reflux disease)     History of shingles     Hypertension     PONV (postoperative nausea and vomiting)     Rotator cuff disorder     left     Ulcer of gastroesophageal junction 2006       Past Surgical History:   Procedure Laterality Date    ACROMIOPLASTY Left 4/27/15    Dr Evelia Lew  age 21    [address] COLONOSCOPY  05.01.03    COLONOSCOPY  02/2017    no need for further colonoscopy per Dr. Tom Muhammad N/A 11/1/2018    Andreea Parker performed by Hola Westfall MD at [facility] [address] Left 01/2016     Left intertrochanteric and shaft femur comminuted fracture, status post Gamma nail and cables.  [place] ESOPHAGOGASTRODUODENOSCOPY US SCOPE W/ADJ STRXRS N/A 11/1/2018    ESOPHAGEAL ENDOSCOPIC ULTRASOUND EXAM performed by Rachna Martinez MD at [facility] [address]      right     UPPER GASTROINTESTINAL ENDOSCOPY N/A 11/1/2018    EGD BIOPSY performed by Rachna Martinez MD at [place] [address]   Allergen Reactions    Levaquin [Levofloxacin In D5w] Nausea And Vomiting    Digoxin Other (See Comments)     Pt states had to go er due to it affected her heart in a bad way-can't recall what type of reaction       Current Outpatient Medications   Medication Sig Dispense Refill    oxyCODONE-acetaminophen (PERCOCET) 7.5-325 MG per tablet Take 1 tablet by mouth every 8 hours as needed for Pain for up to 30 days.  Intended supply: 30 days 90 tablet 0    diclofenac sodium (VOLTAREN) 1 % GEL Apply 2 g topically 4 times daily 1 Tube 5    ondansetron (ZOFRAN) 4 MG tablet Take 1 tablet by mouth 3 times daily as needed for Nausea or Vomiting 30 tablet 2    furosemide (LASIX) 40 MG tablet Take 40 mg by mouth daily      potassium citrate (UROCIT-K) 10 MEQ (1080 MG) extended release tablet Take 20 mEq by mouth daily With furosemide      lisinopril (PRINIVIL;ZESTRIL) 20 MG tablet Take 1 tablet by mouth daily 180 tablet 1    carvedilol (COREG) 6.25 MG tablet Take 1.5 tablets by mouth 2 times daily 180 tablet 1    amLODIPine (NORVASC) 5 MG tablet TAKE ONE TABLET BY MOUTH DAILY 90 tablet 1    albuterol sulfate HFA (PROAIR HFA) 108 (90 Base) MCG/ACT inhaler Inhale 2 puffs into the lungs every 6 hours as needed for Wheezing 1 Inhaler 5    warfarin (COUMADIN) 5 MG tablet Take 5mg by mouth daily EXCEPT 7.5mg every Monday, Wednesday and Friday or as directed by [facility] [phone] tablet 5    naloxone 4 MG/0.1ML LIQD nasal spray 1 spray by Nasal route as needed for Opioid Reversal 1 each 5     No current facility-administered medications for this visit. Prescription pain medication monitoring:      MEDD current = 33.75   ORT Score = 0   LOW     Other Risk factors - CAD, COPD, gastric ulcer, advanced age. Date of Last Medication Agreement: 01/24/2020   Date Naloxone prescribed: NA     UDT:    Date of last UDT: 01/24/2020    Adverse report: No     OARRS:    Checked today: Yes    Adverse report: No     Medication Effects -        Analgesia:      Average level of pain for the past month = 7/10     Percentage of pain relief = 50%     Activities Improved: Activities of daily living = 50%     Adverse Effects: Any adverse effects: No     Type/Severity of side effect: None    Aberrant Behavior:     Any aberrant behavior: No  If yes, describe: None     Controlled Substances Monitoring:    Periodic Controlled Substance Monitoring: Possible medication side effects, risk of tolerance/dependence & alternative treatments discussed. , Assessed functional status., Obtaining appropriate analgesic effect of treatment. Curtis Vuong MD)    - Informed patient that opioid pain medications may not be phoned into the pharmacy or refilled without being seen. Assessment:      ICD-10-CM    1. Polyarthropathy, multiple sites  M13.0 oxyCODONE-acetaminophen (PERCOCET) 7.5-325 MG per tablet     External Referral To Pain Clinic   2. Other spondylosis with radiculopathy, lumbar region  M47.26 oxyCODONE-acetaminophen (PERCOCET) 7.5-325 MG per tablet     External Referral To Pain Clinic   3. Chronic pain syndrome  G89.4 External Referral To Pain Clinic   4. Chronic, continuous use of opioids  F11.90    5. Pain medication agreement  Z02.89 oxyCODONE-acetaminophen (PERCOCET) 7.5-325 MG per tablet       Plan:     1. Chronic pain in multiple joints and back with axial features for decades; no recent changes. 2. History of bilateral shoulder replacement (2015), left hip ORIF (2016) through [facility].   3. Intolerant of NSAIDs due to anticoagulant therapy and on chronic opioids for years. 4. Reports ups and downs in pain, but controlled on Percocet PRN - reduced opioid use by 25% since starting with us a year ago. 5. Informed her I am unavailable after this month to continue care - pain referral ordered. 6. Counseled on opioid and refilled Percocet 7.5 mg TID PRN - sent by e-script due to continued lock down of this office for [address] outbreak. Informed that I am available for the next 30-days for emergencies only. Otherwise, care returned to PCP - patient expressed understanding the same. Analgesic Plan:   Continue present regimen: Yes   Adjust dose of present analgesic: No   Switch analgesics: No   Add/Adjust concomitant therapy: No    Follow up:    Care returned to PCP     Documentation:    Details of this discussion including any medical advice provided: as above. I affirm this is a Patient Initiated Episode with an Established Patient who has not had a related appointment within my department in the past 7 days or scheduled within the next 24 hours. Total Time: minutes: 11-20 minutes    This visit was completed VIRTUALLY using Telephone encounter as above. Services were provided through a telephone synchronous discussion virtually to substitute for in-person clinic visit. Patient and provider were located at their individual homes. Tash Reyes MD  Board Certified in Anesthesiology and Pain Medicine    Pursuant to the emergency declaration under the [facility] waiver authority and the Coronavirus Preparedness and Response Supplemental Appropriations Act, this Virtual Visit was conducted with patient's (and/or legal guardian's) consent, to reduce the patient's risk of exposure to COVID-19 and provide necessary medical care.   The patient (and/or legal guardian) has also been advised to contact this office for worsening conditions or problems, and seek emergency medical treatment and/or call 911 if deemed necessary.

## 2020-08-07 NOTE — PROGRESS NOTES
Ms. Dorothy Gordon is a 80 y.o.  female. Ms. Dorothy Gordon had an INR test today. Results were reviewed and appropriate warfarin management was completed. THIS VISIT WAS COMPLETED AS:   []    A VIRTUAL VISIT VIA TELEPHONE IN EFFORTS TO REDUCE THE SPREAD OF COVID-19.  []    A DRIVE-THRU VISIT IN EFFORTS TO REDUCE THE SPREAD OF COVID-19. [x]    AN IN PERSON VISIT. PROTOCOLS WERE FOLLOWED WITH PRECAUTIONS TO REDUCE THE SPREAD OF COVID-19. Patient verifies current dosing regimen: Yes     Warfarin medication reviewed and updated on the patient 's home medication list: No    All other medications reviewed and updated on the patient 's home medication list: Yes     Lab Results   Component Value Date    INR 2.9 2020    INR 1.7 2020    INR 1.42 (H) 2020       Patient Findings     Positives:   Change in medications    Negatives:   Signs/symptoms of thrombosis, Signs/symptoms of bleeding, Laboratory test error suspected, Change in health, Change in alcohol use, Change in activity, Upcoming invasive procedure, Emergency department visit, Upcoming dental procedure, Missed doses, Extra doses, Hospital admission, Bruising, Other complaints    Comments:   Stopped Oxicodone  Drinks multiple cans of Boost daily          Anticoagulation Summary  As of 2020    INR goal:   2.0-3.0   TTR:   62.1 % (5.9 y)   INR used for dosin.9 (2020)   Warfarin maintenance plan:   5 mg (5 mg x 1) every Mon, Wed, Fri; 7.5 mg (5 mg x 1.5) all other days   Weekly warfarin total:   45 mg   Plan last modified:   Litzy Mann RP (2019)   Next INR check:   2020   Priority:   Maintenance   Target end date: Indefinite    Indications    Atrial fibrillation with rapid ventricular response (HCC) [I48.91]             Anticoagulation Episode Summary     INR check location:       Preferred lab:       Send INR reminders to:   [facility]    Comments:   EPICBenny Avila Also: CHF. Bob Avila Ask patient if she want her BP and Pulse checked with each visit. Anticoagulation Care Providers     Provider Role Specialty Phone number    Donny Mendez MD Responsible Cardiology [phone]          Warfarin plan:   Patient states decreased ambulation. Painful to walk and can not move very fast.  No complaints regarding warfarin therapy. Has difficulty keeping appointments due to transportation. Refuses outside transportation [facility]). Drinking a can of Boost multiple times per week. Sometime two a day. Description    CONTINUE:  Warfarin 7.5 mg daily except 5 mg on Mondays, Wednesday and Fridays. Drunks Boost multiple times a day. Call clinic with any new medications especially antibiotics and Oral steroids. Patient does not eat much vitamin K greens. Reviewed AVS with patient / caregiver.       CLINICAL PHARMACY CONSULT: MED RECONCILIATION/REVIEW ADDENDUM    For Pharmacy Admin Tracking Only    PHSO: No  Total # of Interventions Recommended: 0    - Maintenance Safety Lab Monitoring #: 1  Total Interventions Accepted: 0  Time Spent (min): [address] [facility], [facility], [place] 8/7/2020  10:57 AM

## 2020-09-01 NOTE — PATIENT INSTRUCTIONS
Patient Education        Back Stretches: Exercises  Introduction  Here are some examples of exercises for stretching your back. Start each exercise slowly. Ease off the exercise if you start to have pain. Your doctor or physical therapist will tell you when you can start these exercises and which ones will work best for you. How to do the exercises  Overhead stretch   1. Stand comfortably with your feet shoulder-width apart. 2. Looking straight ahead, raise both arms over your head and reach toward the ceiling. Do not allow your head to tilt back. 3. Hold for 15 to 30 seconds, then lower your arms to your sides. 4. Repeat 2 to 4 times. Side stretch   1. Stand comfortably with your feet shoulder-width apart. 2. Raise one arm over your head, and then lean to the other side. 3. Slide your hand down your leg as you let the weight of your arm gently stretch your side muscles. Hold for 15 to 30 seconds. 4. Repeat 2 to 4 times on each side. Press-up   1. Lie on your stomach, supporting your body with your forearms. 2. Press your elbows down into the floor to raise your upper back. As you do this, relax your stomach muscles and allow your back to arch without using your back muscles. As your press up, do not let your hips or pelvis come off the floor. 3. Hold for 15 to 30 seconds, then relax. 4. Repeat 2 to 4 times. Relax and rest   1. Lie on your back with a rolled towel under your neck and a pillow under your knees. Extend your arms comfortably to your sides. 2. Relax and breathe normally. 3. Remain in this position for about 10 minutes. 4. If you can, do this 2 or 3 times each day. Follow-up care is a key part of your treatment and safety. Be sure to make and go to all appointments, and call your doctor if you are having problems. It's also a good idea to know your test results and keep a list of the medicines you take. Where can you learn more? Go to https://john.healthTypemock. org and sign in to your [employer] account. Enter S679 in the [employer] box to learn more about \"Back Stretches: Exercises. \"     If you do not have an account, please click on the \"Sign Up Now\" link. Current as of: March 2, 2020               Content Version: 12.5  © 2006-2020 [employer]. Care instructions adapted under license by [facility] ([facility]). If you have questions about a medical condition or this instruction, always ask your healthcare professional. [place] [address] any warranty or liability for your use of this information. Patient Education        Knee: Exercises  Introduction  Here are some examples of exercises for you to try. The exercises may be suggested for a condition or for rehabilitation. Start each exercise slowly. Ease off the exercises if you start to have pain. You will be told when to start these exercises and which ones will work best for you. How to do the exercises  Quad sets   5. Sit with your leg straight and supported on the floor or a firm bed. (If you feel discomfort in the front or back of your knee, place a small towel roll under your knee.)  6. Tighten the muscles on top of your thigh by pressing the back of your knee flat down to the floor. (If you feel discomfort under your kneecap, place a small towel roll under your knee.)  7. Hold for about 6 seconds, then rest for up to 10 seconds. 8. Do 8 to 12 repetitions several times a day. Straight-leg raises to the front   5. Lie on your back with your good knee bent so that your foot rests flat on the floor. Your injured leg should be straight. Make sure that your low back has a normal curve. You should be able to slip your flat hand in between the floor and the small of your back, with your palm touching the floor and your back touching the back of your hand. 6. Tighten the thigh muscles in the injured leg by pressing the back of your knee flat down to the floor.  Hold your knee straight. 7. Keeping the thigh muscles tight, lift your injured leg up so that your heel is about 12 inches off the floor. Hold for about 6 seconds and then lower slowly. 8. Do 8 to 12 repetitions, 3 times a day. Straight-leg raises to the outside   5. Lie on your side, with your injured leg on top. 6. Tighten the front thigh muscles of your injured leg to keep your knee straight. 7. Keep your hip and your leg straight in line with the rest of your body, and keep your knee pointing forward. Do not drop your hip back. 8. Lift your injured leg straight up toward the ceiling, about 12 inches off the floor. Hold for about 6 seconds, then slowly lower your leg. 9. Do 8 to 12 repetitions. Straight-leg raises to the back   5. Lie on your stomach, and lift your leg straight up behind you (toward the ceiling). 6. Lift your toes about 6 inches off the floor, hold for about 6 seconds, then lower slowly. 7. Do 8 to 12 repetitions. Straight-leg raises to the inside   1. Lie on the side of your body with the injured leg. 2. You can either prop your other (good) leg up on a chair, or you can bend your good knee and put that foot in front of your injured knee. Do not drop your hip back. 3. Tighten the muscles on the front of your thigh to straighten your injured knee. 4. Keep your kneecap pointing forward, and lift your whole leg up toward the ceiling about 6 inches. Hold for about 6 seconds, then lower slowly. 5. Do 8 to 12 repetitions. Heel dig bridging   1. Lie on your back with both knees bent and your ankles bent so that only your heels are digging into the floor. Your knees should be bent about 90 degrees. 2. Then push your heels into the floor, squeeze your buttocks, and lift your hips off the floor until your shoulders, hips, and knees are all in a straight line.   3. Hold for about 6 seconds as you continue to breathe normally, and then slowly lower your hips back down to the floor and rest for up to 10 seconds. 4. Do 8 to 12 repetitions. Hamstring curls   1. Lie on your stomach with your knees straight. If your kneecap is uncomfortable, roll up a washcloth and put it under your leg just above your kneecap. 2. Lift the foot of your injured leg by bending the knee so that you bring the foot up toward your buttock. If this motion hurts, try it without bending your knee quite as far. This may help you avoid any painful motion. 3. Slowly lower your leg back to the floor. 4. Do 8 to 12 repetitions. 5. With permission from your doctor or physical therapist, you may also want to add a cuff weight to your ankle (not more than 5 pounds). With weight, you do not have to lift your leg more than 12 inches to get a hamstring workout. Shallow standing knee bends   Do this exercise only if you have very little pain; if you have no clicking, locking, or giving way if you have an injured knee; and if it does not hurt while you are doing 8 to 12 repetitions. 1. Stand with your hands lightly resting on a counter or chair in front of you. Put your feet shoulder-width apart. 2. Slowly bend your knees so that you squat down like you are going to sit in a chair. Make sure your knees do not go in front of your toes. 3. Lower yourself about 6 inches. Your heels should remain on the floor at all times. 4. Rise slowly to a standing position. Heel raises   1. Stand with your feet a few inches apart, with your hands lightly resting on a counter or chair in front of you. 2. Slowly raise your heels off the floor while keeping your knees straight. 3. Hold for about 6 seconds, then slowly lower your heels to the floor. 4. Do 8 to 12 repetitions several times during the day. Follow-up care is a key part of your treatment and safety. Be sure to make and go to all appointments, and call your doctor if you are having problems.  It's also a good idea to know your test results and keep a list of the medicines you take.  Where can you learn more? Go to https://chpepiceweb.healthQingdao [employer]. org and sign in to your SelSaharat account. Enter N524 in the [employer] box to learn more about \"Knee: Exercises. \"     If you do not have an account, please click on the \"Sign Up Now\" link. Current as of: March 2, 2020               Content Version: 12.5  © 2006-2020 Healthwise[employer] Incorporated. Care instructions adapted under license by [facility] ([facility]). If you have questions about a medical condition or this instruction, always ask your healthcare professional. [address] any warranty or liability for your use of this information.

## 2020-09-11 NOTE — PROGRESS NOTES
Ms. Melvyn Curling is a 80 y.o.  female. Ms. Melvyn Curling had an INR test today. Results were reviewed and appropriate warfarin management was completed. THIS VISIT WAS COMPLETED AS:   []    A VIRTUAL VISIT VIA TELEPHONE IN EFFORTS TO REDUCE THE SPREAD OF COVID-19.  []    A DRIVE-THRU VISIT IN EFFORTS TO REDUCE THE SPREAD OF COVID-19. [x]    AN IN PERSON VISIT. PROTOCOLS WERE FOLLOWED WITH PRECAUTIONS TO REDUCE THE SPREAD OF COVID-19. Patient verifies current dosing regimen: Yes     Warfarin medication reviewed and updated on the patient 's home medication list: Yes   All other medications reviewed and updated on the patient 's home medication list: No medication changes     Lab Results   Component Value Date    INR 2.8 2020    INR 2.9 2020    INR 1.7 2020       Patient Findings     Negatives:   Signs/symptoms of thrombosis, Signs/symptoms of bleeding, Laboratory test error suspected, Change in health, Change in alcohol use, Change in activity, Upcoming invasive procedure, Emergency department visit, Upcoming dental procedure, Missed doses, Extra doses, Change in medications, Change in diet/appetite, Hospital admission, Bruising, Other complaints          Anticoagulation Summary  As of 2020    INR goal:   2.0-3.0   TTR:   62.7 % (6 y)   INR used for dosin.8 (2020)   Warfarin maintenance plan:   5 mg (5 mg x 1) every Mon, Wed, Fri; 7.5 mg (5 mg x 1.5) all other days   Weekly warfarin total:   45 mg   Plan last modified:   Reg Ventura RPH (2019)   Next INR check:   10/16/2020   Priority:   Maintenance   Target end date: Indefinite    Indications    Atrial fibrillation with rapid ventricular response (HCC) [I48.91]             Anticoagulation Episode Summary     INR check location:   Anticoagulation Clinic    Preferred lab:       Send INR reminders to:   [facility] STAFF    Comments:   GIULIANA Jones Also: CHF. García Jones Ask patient if she want her BP and Pulse checked with each visit. Anticoagulation Care Providers     Provider Role Specialty Phone number    Gene Morales MD Responsible Cardiology [phone]          Warfarin plan:   No complaints regarding warfarin therapy. No change to weekly warfarin dosing. Description    CONTINUE:  Warfarin 7.5 mg daily except 5 mg on Mondays, Wednesday and Fridays. Drunks Boost multiple times a day. Call clinic with any new medications especially antibiotics and Oral steroids. Patient does not eat much vitamin K greens. Reviewed AVS with patient / caregiver.       CLINICAL PHARMACY CONSULT: MED RECONCILIATION/REVIEW ADDENDUM    For Pharmacy Admin Tracking Only    PHSO: Yes  Total # of Interventions Recommended: 0    - Maintenance Safety Lab Monitoring #: 1  Total Interventions Accepted: 0  Time Spent (min): [address], [place], [place] 9/11/2020  10:35 AM

## 2020-10-06 NOTE — PROGRESS NOTES
Gian Gann  1935  [id number]      HISTORY OF PRESENT ILLNESS:  Ms. Isaac Urias is a 80 y.o. female returns for a follow up visit for pain management  She has a diagnosis of   1. Chronic pain syndrome    2. Fibromyalgia    3. Other spondylosis with radiculopathy, lumbar region    4. Lumbar radiculopathy    5. Polyarthropathy, multiple sites    6. Primary osteoarthritis of both hips    7. Neuropathy    8. Chronic obstructive pulmonary disease, unspecified COPD type ([facility]    9. Morbid obesity due to excess calories ([facility]    10. Depression, unspecified depression type    11. Primary insomnia    12. Therapeutic opioid induced constipation      On the Patients Pain Assessment form:  She complains of pain in the both arm(s): entire area, bilateral lower back and both hips She rates the pain 8/10 and describes it as sharp, aching, numbness. Current treatment regimen has helped relieve about 90% of the pain. She denies any side effects from the current pain regimen. Patient reports that since the last follow up visit the physical functioning is unchanged, family/social relationships are unchanged, mood is worse sleep patterns are unchanged, and that the overall functioning is unchanged. Patient denies misusing/abusing her narcotic pain medications or using any illegal drugs. There are No indicators for possible drug abuse, addiction or diversion problems. Upon obtaining medical history from Ms. Isaac Urias states that pain is manageable on current pain therapy. Takes pain medications as prescribed. Mood is stable without anxiety. Sleep is fair with an average of 5-6 hours. Denies to having issues of constipation. Tolerating activities/house chores with moderate tenderness to the lower back. ALLERGIES: Patients list of allergies were reviewed     MEDICATIONS: Ms. Isaac Urias list of medications were reviewed. Her current medications are   Outpatient Medications Prior to Visit   Medication Sig Dispense Refill    DULoxetine (CYMBALTA) 30 MG extended release capsule Take 1 capsule by mouth daily 30 capsule 3    diclofenac sodium (VOLTAREN) 1 % GEL Apply 2 g topically 4 times daily 1 Tube 5    ondansetron (ZOFRAN) 4 MG tablet Take 1 tablet by mouth 3 times daily as needed for Nausea or Vomiting 30 tablet 2    furosemide (LASIX) 40 MG tablet Take 40 mg by mouth daily      potassium citrate (UROCIT-K) 10 MEQ (1080 MG) extended release tablet Take 20 mEq by mouth daily With furosemide      lisinopril (PRINIVIL;ZESTRIL) 20 MG tablet Take 1 tablet by mouth daily 180 tablet 1    carvedilol (COREG) 6.25 MG tablet Take 1.5 tablets by mouth 2 times daily 180 tablet 1    amLODIPine (NORVASC) 5 MG tablet TAKE ONE TABLET BY MOUTH DAILY 90 tablet 1    albuterol sulfate HFA (PROAIR HFA) 108 (90 Base) MCG/ACT inhaler Inhale 2 puffs into the lungs every 6 hours as needed for Wheezing 1 Inhaler 5    warfarin (COUMADIN) 5 MG tablet Take 5mg by mouth daily EXCEPT 7.5mg every Monday, Wednesday and Friday or as directed by [facility] [phone] tablet 5    naloxone 4 MG/0.1ML LIQD nasal spray 1 spray by Nasal route as needed for Opioid Reversal 1 each 5    gabapentin (NEURONTIN) 100 MG capsule Take 1 capsule by mouth nightly for 30 days. 30 capsule 0     No facility-administered medications prior to visit. SOCIAL/FAMILY/PAST MEDICAL HISTORY: Ms. Townsend Stands, family and past medical history was reviewed. REVIEW OF SYSTEMS:    Respiratory: Negative for apnea, chest tightness and shortness of breath or change in baseline breathing. Gastrointestinal: Negative for nausea, vomiting, abdominal pain, diarrhea, constipation, blood in stool and abdominal distention. PHYSICAL EXAM:   Nursing note and vitals reviewed. BP (!) 154/83   Pulse 92   Temp 97.6 °F (36.4 °C) (Oral)   LMP  (LMP Unknown)   SpO2 94%   Constitutional: She appears well-developed and well-nourished. No acute distress.    Skin: Skin is warm and dry, good turgor. No rash noted. She is not diaphoretic. Cardiovascular: Normal rate, regular rhythm, normal heart sounds, and does not have murmur. Pulmonary/Chest: Effort normal. No respiratory distress. She does not have wheezes in the lung fields. She has no rales. Neurological/Psychiatric:She is alert and oriented to person, place, and time. Coordination is  normal. Stable gait with the aid of a cane Her mood isAppropriate and affect is Neutral/Euthymic(normal) . IMPRESSION:   1. Chronic pain syndrome    2. Fibromyalgia    3. Other spondylosis with radiculopathy, lumbar region    4. Lumbar radiculopathy    5. Polyarthropathy, multiple sites    6. Primary osteoarthritis of both hips    7. Neuropathy    8. Chronic obstructive pulmonary disease, unspecified COPD type ([facility])    9. Morbid obesity due to excess calories ([facility])    10. Depression, unspecified depression type    11. Primary insomnia    12. Therapeutic opioid induced constipation        PLAN:  Informed verbal consent was obtained  -Continue with Perccoet, Neurontin, Cymbalta  -Declined PT, Home exercises recommended  -Monitor BP, f/u with PCP if it continues to stay elevated or she becomes symptomatic with SOB, CP, syncopy. she is currently asymptomatic  -CBT techniques- relaxation therapies such as biofeedback, mindfulness based stress reduction, imagery, cognitive restructuring, problem solving discussed with patient  -Reviewed Covid-19 safety regulations which were discussed including Flu vaccine, patient maintains compliance with the safety guidelines regarding Covid-19  -Last UDS 9/10/20 Consistent  -Return in about 4 weeks (around 11/3/2020). Current Outpatient Medications   Medication Sig Dispense Refill    gabapentin (NEURONTIN) 100 MG capsule Take 1 capsule by mouth nightly for 30 days.  30 capsule 0    oxyCODONE-acetaminophen (PERCOCET) 7.5-325 MG per tablet Take 1 tablet by mouth every 8 hours as needed for Pain for up to 30 days. Intended supply: 30 days 90 tablet 0    DULoxetine (CYMBALTA) 30 MG extended release capsule Take 1 capsule by mouth daily 30 capsule 3    diclofenac sodium (VOLTAREN) 1 % GEL Apply 2 g topically 4 times daily 1 Tube 5    ondansetron (ZOFRAN) 4 MG tablet Take 1 tablet by mouth 3 times daily as needed for Nausea or Vomiting 30 tablet 2    furosemide (LASIX) 40 MG tablet Take 40 mg by mouth daily      potassium citrate (UROCIT-K) 10 MEQ (1080 MG) extended release tablet Take 20 mEq by mouth daily With furosemide      lisinopril (PRINIVIL;ZESTRIL) 20 MG tablet Take 1 tablet by mouth daily 180 tablet 1    carvedilol (COREG) 6.25 MG tablet Take 1.5 tablets by mouth 2 times daily 180 tablet 1    amLODIPine (NORVASC) 5 MG tablet TAKE ONE TABLET BY MOUTH DAILY 90 tablet 1    albuterol sulfate HFA (PROAIR HFA) 108 (90 Base) MCG/ACT inhaler Inhale 2 puffs into the lungs every 6 hours as needed for Wheezing 1 Inhaler 5    warfarin (COUMADIN) 5 MG tablet Take 5mg by mouth daily EXCEPT 7.5mg every Monday, Wednesday and Friday or as directed by [facility] [phone] tablet 5    naloxone 4 MG/0.1ML LIQD nasal spray 1 spray by Nasal route as needed for Opioid Reversal 1 each 5     No current facility-administered medications for this visit. I will continue her current medication regimen  which is part of the above treatment schedule. It has been helping with Ms. Denis's chronic  medical problems which for this visit include:   Diagnoses of Chronic pain syndrome, Fibromyalgia, Other spondylosis with radiculopathy, lumbar region, Lumbar radiculopathy, Polyarthropathy, multiple sites, Primary osteoarthritis of both hips, Neuropathy, Chronic obstructive pulmonary disease, unspecified COPD type ([address] Morbid obesity due to excess calories ([address] Depression, unspecified depression type, Primary insomnia, and Therapeutic opioid induced constipation were pertinent to this visit. Risks and benefits of the medications and other alternative treatments  including no treatment were discussed with the patient. The common side effects of these medications were also explained to the patient. Informed verbal consent was obtained. Goals of current treatment regimen include improvement in pain, restoration of functioning- with focus on improvement in physical performance, general activity, work or disability,emotional distress, health care utilization and  decreased medication consumption. Will continue to monitor progress towards achieving/maintaining therapeutic goals with special emphasis on  1. Improvement in perceived interfernce  of pain with ADL's. Ability to do home exercises independently. Ability to do household chores indoor and/or outdoor work and social and leisure activities. Improve psychosocial and physical functioning. - she is showing progression towards this treatment goal with the current regimen. She was advised against drinking alcohol with the narcotic pain medicines, advised against driving or handling machinery while adjusting the dose of medicines or if having cognitive  issues related to the current medications. Risk of overdose and death, if medicines not taken as prescribed, were also discussed. If the patient develops new symptoms or if the symptoms worsen, the patient should call the office. While transcribing every attempt was made to maintain the accuracy of the note in terms of it's contents,there may have been some errors made inadvertently. Thank you for allowing me to participate in the care of this patient.     Magdi Calvillo CNP    Cc: Adelina Naik MD

## 2020-10-06 NOTE — PATIENT INSTRUCTIONS
Patient Education        Back Stretches: Exercises  Introduction  Here are some examples of exercises for stretching your back. Start each exercise slowly. Ease off the exercise if you start to have pain. Your doctor or physical therapist will tell you when you can start these exercises and which ones will work best for you. How to do the exercises  Overhead stretch   1. Stand comfortably with your feet shoulder-width apart. 2. Looking straight ahead, raise both arms over your head and reach toward the ceiling. Do not allow your head to tilt back. 3. Hold for 15 to 30 seconds, then lower your arms to your sides. 4. Repeat 2 to 4 times. Side stretch   1. Stand comfortably with your feet shoulder-width apart. 2. Raise one arm over your head, and then lean to the other side. 3. Slide your hand down your leg as you let the weight of your arm gently stretch your side muscles. Hold for 15 to 30 seconds. 4. Repeat 2 to 4 times on each side. Press-up   1. Lie on your stomach, supporting your body with your forearms. 2. Press your elbows down into the floor to raise your upper back. As you do this, relax your stomach muscles and allow your back to arch without using your back muscles. As your press up, do not let your hips or pelvis come off the floor. 3. Hold for 15 to 30 seconds, then relax. 4. Repeat 2 to 4 times. Relax and rest   1. Lie on your back with a rolled towel under your neck and a pillow under your knees. Extend your arms comfortably to your sides. 2. Relax and breathe normally. 3. Remain in this position for about 10 minutes. 4. If you can, do this 2 or 3 times each day. Follow-up care is a key part of your treatment and safety. Be sure to make and go to all appointments, and call your doctor if you are having problems. It's also a good idea to know your test results and keep a list of the medicines you take. Where can you learn more? Go to https://john.healthKeukey. org and sign in to your [employer] account. Enter U834 in the [employer]ifter box to learn more about \"Back Stretches: Exercises. \"     If you do not have an account, please click on the \"Sign Up Now\" link. Current as of: March 2, 2020               Content Version: 12.5  © 4318-9964 [employer]wise, Incorporated. Care instructions adapted under license by [facility] ([facility]). If you have questions about a medical condition or this instruction, always ask your healthcare professional. [address] any warranty or liability for your use of this information.

## 2020-11-03 NOTE — PROGRESS NOTES
TELE HEALTH VISIT (AUDIO-VISUAL)    Pursuant to the emergency declaration under the [facility] waiver authority and the [employer] Act, this Virtual  Visit was conducted, with patient's consent, to reduce the patient's risk of exposure to COVID-19 and provide continuity of care for an established patient. Service is  provided through a video synchronous discussion virtually to substitute for in-person clinic visit. Due to this being a TeleHealth encounter (During LQMJS-58 public health emergency), evaluation of the following organ systems was limited: Vitals/Constitutional/EENT/Resp/CV/GI//MS/Neuro/Skin/Vlql-Vkyv-Agf. Kenyettandnicho Tapia  1935  [id number]    Ms. Debora Gu is being seen virtually for a follow up visit using Doxy. me/BalaBit Video visit/MitoProdo or face time  Informed verbal consent to the virtual visit was obtained from Ms. Debora Gu. Risks associated with HIPPA compliance with the virtual visit was explained to the patient. Ms. Debora Gu is at her home and Enoc CISNEROS is in her office. HISTORY OF PRESENT ILLNESS:  Ms. Debora Gu is a 80 y.o. female  being assessed for a follow up visit for pain management for evaluation of ongoing care regarding her symptoms and monitoring of compliance with long term use high risk medications. She has a diagnosis of   1. Chronic pain syndrome    2. Fibromyalgia    3. Other spondylosis with radiculopathy, lumbar region    4. Lumbar radiculopathy    5. Polyarthropathy, multiple sites    6. Primary osteoarthritis of both hips    7. Neuropathy    8. Chronic obstructive pulmonary disease, unspecified COPD type ([address]    9. Morbid obesity due to excess calories ([address]    10. Depression, unspecified depression type    11. Primary insomnia    12.  Therapeutic opioid induced constipation      On the Patients Pain Assessment form reviewed with the Medical Assistant:  She complains of pain in the bilateral lower back and both shoulder(s): entire area  with radiation to the hips Bilateral . She rates the pain 8/10 and describes it as aching, cramping. Current treatment regimen has helped relieve about 50% of the pain. She denies any side effects from the current pain regimen. Patient reports that since the last follow up visit the physical functioning is unchanged, family/social relationships are unchanged, mood is unchanged sleep patterns are unchanged, and that the overall functioning is unchanged. Patient denies misusing/abusing her narcotic pain medications or using any illegal drugs. Upon obtaining medical history from Ms. Renate Donohue states that pain is manageable on current pain therapy. Reports having increased tenderness to the lower back in the past couple of days, pain medications provide moderate relief of pain, takes them as prescribed. Mood is stable without anxiety. Sleep is fair with an average of 5-6 hours. Denies to having issues of constipation. Tolerating activities/house chores with moderate tenderness to the lower back. ALLERGIES: Patients list of allergies were reviewed     MEDICATIONS: Ms. Mor Rhodes list of medications were reviewed. Her current medications are   Outpatient Medications Prior to Visit   Medication Sig Dispense Refill    DULoxetine (CYMBALTA) 30 MG extended release capsule Take 1 capsule by mouth daily 30 capsule 3    diclofenac sodium (VOLTAREN) 1 % GEL Apply 2 g topically 4 times daily 1 Tube 5    ondansetron (ZOFRAN) 4 MG tablet Take 1 tablet by mouth 3 times daily as needed for Nausea or Vomiting 30 tablet 2    furosemide (LASIX) 40 MG tablet Take 40 mg by mouth daily      potassium citrate (UROCIT-K) 10 MEQ (1080 MG) extended release tablet Take 20 mEq by mouth daily With furosemide      lisinopril (PRINIVIL;ZESTRIL) 20 MG tablet Take 1 tablet by mouth daily 180 tablet 1    carvedilol (COREG) 6.25 MG tablet Take 1.5 tablets by mouth 2 times daily 180 tablet 1    amLODIPine (NORVASC) 5 MG tablet TAKE ONE TABLET BY MOUTH DAILY 90 tablet 1    albuterol sulfate HFA (PROAIR HFA) 108 (90 Base) MCG/ACT inhaler Inhale 2 puffs into the lungs every 6 hours as needed for Wheezing 1 Inhaler 5    warfarin (COUMADIN) 5 MG tablet Take 5mg by mouth daily EXCEPT 7.5mg every Monday, Wednesday and Friday or as directed by [facility] [phone] tablet 5    naloxone 4 MG/0.1ML LIQD nasal spray 1 spray by Nasal route as needed for Opioid Reversal 1 each 5    gabapentin (NEURONTIN) 100 MG capsule Take 1 capsule by mouth nightly for 30 days. 30 capsule 0    oxyCODONE-acetaminophen (PERCOCET) 7.5-325 MG per tablet Take 1 tablet by mouth every 8 hours as needed for Pain for up to 30 days. Intended supply: 30 days 90 tablet 0     No facility-administered medications prior to visit. SOCIAL/FAMILY/PAST MEDICAL HISTORY: Ms. Natalia Roy, family and past medical history was reviewed. REVIEW OF SYSTEMS:    Respiratory: Negative for apnea, chest tightness and shortness of breath or change in baseline breathing. Gastrointestinal: Negative for nausea, vomiting, abdominal pain, diarrhea, constipation, blood in stool and abdominal distention. PHYSICAL EXAM:   Nursing note and vitals reviewed. LMP  (LMP Unknown)  as per patient  Constitutional: She appears well-developed and well-nourished. No acute distress. Skin: Skin appears to be warm and dry. No rashes or any other marks noted. She is not diaphoretic. Neurological/Psychiatric:She is alert and oriented to person, place, and time. Coordination is  normal.  Her mood isAppropriate and affect is Neutral/Euthymic(normal). Her behavior is normal.   thought content normal.   Musculoskeletal / Extremities:Not assessed    IMPRESSION:   1. Chronic pain syndrome    2. Fibromyalgia    3. Other spondylosis with radiculopathy, lumbar region    4. Lumbar radiculopathy    5.  Polyarthropathy, multiple sites    6. Primary osteoarthritis of both hips    7. Neuropathy    8. Chronic obstructive pulmonary disease, unspecified COPD type ([facility]    9. Morbid obesity due to excess calories ([facility] [address]    10. Depression, unspecified depression type    11. Primary insomnia    12. Therapeutic opioid induced constipation        PLAN:  Informed verbal consent regarding treatment was obtained  -Continue with Percocet, Neurontin Cymbalta  -Medrol dose pack  -Marya exercises, declined home PT, back stretches/exercises recommended  -Educations provided on TPI of the Lumbar spine, side effects reviewed including a temporary elevation in blood sugars, advised to monitor closely. She verbalized understanding, Reports she ill monitor pain after Medrol dose pack, will call the office if pain persists  -CBT techniques- relaxation therapies such as biofeedback, mindfulness based stress reduction, imagery, cognitive restructuring, problem solving discussed with patient  -Reviewed Covid-19 safety regulations which were discussed including Flu vaccinet, patient maintains compliance with the safety guidelines regarding Covid-19  -Last UDS 9/1/20 Consistent  -Return in about 4 weeks (around 12/1/2020). Current Outpatient Medications   Medication Sig Dispense Refill    gabapentin (NEURONTIN) 100 MG capsule Take 1 capsule by mouth nightly for 30 days. 30 capsule 0    oxyCODONE-acetaminophen (PERCOCET) 7.5-325 MG per tablet Take 1 tablet by mouth every 8 hours as needed for Pain for up to 30 days. Intended supply: 30 days 90 tablet 0    methylPREDNISolone (MEDROL DOSEPACK) 4 MG tablet Take by mouth.  1 kit 0    DULoxetine (CYMBALTA) 30 MG extended release capsule Take 1 capsule by mouth daily 30 capsule 3    diclofenac sodium (VOLTAREN) 1 % GEL Apply 2 g topically 4 times daily 1 Tube 5    ondansetron (ZOFRAN) 4 MG tablet Take 1 tablet by mouth 3 times daily as needed for Nausea or Vomiting 30 tablet 2    furosemide (LASIX) 40 MG tablet Take 40 mg by mouth daily      potassium citrate (UROCIT-K) 10 MEQ (1080 MG) extended release tablet Take 20 mEq by mouth daily With furosemide      lisinopril (PRINIVIL;ZESTRIL) 20 MG tablet Take 1 tablet by mouth daily 180 tablet 1    carvedilol (COREG) 6.25 MG tablet Take 1.5 tablets by mouth 2 times daily 180 tablet 1    amLODIPine (NORVASC) 5 MG tablet TAKE ONE TABLET BY MOUTH DAILY 90 tablet 1    albuterol sulfate HFA (PROAIR HFA) 108 (90 Base) MCG/ACT inhaler Inhale 2 puffs into the lungs every 6 hours as needed for Wheezing 1 Inhaler 5    naloxone 4 MG/0.1ML LIQD nasal spray 1 spray by Nasal route as needed for Opioid Reversal 1 each 5    warfarin (COUMADIN) 5 MG tablet TAKE 1 AND 1/2 TABLET BY MOUTH ON MONDAY, WEDNESDAY, AND FRIDAY AND TAKE ONE TABLET BY MOUTH DAILY ON ALL OTHER DAYS 120 tablet 5     No current facility-administered medications for this visit. I will continue her current medication regimen  which is part of the above treatment schedule. It has been helping with Ms. Denis's chronic  medical problems which for this visit include:   Diagnoses of Chronic pain syndrome, Fibromyalgia, Other spondylosis with radiculopathy, lumbar region, Lumbar radiculopathy, Polyarthropathy, multiple sites, Primary osteoarthritis of both hips, Neuropathy, Chronic obstructive pulmonary disease, unspecified COPD type ([address] Morbid obesity due to excess calories ([address] Depression, unspecified depression type, Primary insomnia, and Therapeutic opioid induced constipation were pertinent to this visit. Risks and benefits of the medications and other alternative treatments  including no treatment were discussed with the patient. The common side effects of these medications were also explained to the patient. Informed verbal consent was obtained.    Goals of current treatment regimen include improvement in pain, restoration of functioning- with focus on improvement in physical performance, general

## 2020-11-27 NOTE — TELEPHONE ENCOUNTER
Call from Aidee Griffin: cancelling today's appointment with [facility] for INR. Returned call and left a voice mail for her to call us to schedule an appointment. Taya Marroquin [facility], [address] [facility]  Anticoagulation Clinic  [address] [place] [address] [facility]  [phone]

## 2020-12-01 NOTE — PATIENT INSTRUCTIONS
Patient Education        Back Stretches: Exercises  Introduction  Here are some examples of exercises for stretching your back. Start each exercise slowly. Ease off the exercise if you start to have pain. Your doctor or physical therapist will tell you when you can start these exercises and which ones will work best for you. How to do the exercises  Overhead stretch   1. Stand comfortably with your feet shoulder-width apart. 2. Looking straight ahead, raise both arms over your head and reach toward the ceiling. Do not allow your head to tilt back. 3. Hold for 15 to 30 seconds, then lower your arms to your sides. 4. Repeat 2 to 4 times. Side stretch   1. Stand comfortably with your feet shoulder-width apart. 2. Raise one arm over your head, and then lean to the other side. 3. Slide your hand down your leg as you let the weight of your arm gently stretch your side muscles. Hold for 15 to 30 seconds. 4. Repeat 2 to 4 times on each side. Press-up   1. Lie on your stomach, supporting your body with your forearms. 2. Press your elbows down into the floor to raise your upper back. As you do this, relax your stomach muscles and allow your back to arch without using your back muscles. As your press up, do not let your hips or pelvis come off the floor. 3. Hold for 15 to 30 seconds, then relax. 4. Repeat 2 to 4 times. Relax and rest   1. Lie on your back with a rolled towel under your neck and a pillow under your knees. Extend your arms comfortably to your sides. 2. Relax and breathe normally. 3. Remain in this position for about 10 minutes. 4. If you can, do this 2 or 3 times each day. Follow-up care is a key part of your treatment and safety. Be sure to make and go to all appointments, and call your doctor if you are having problems. It's also a good idea to know your test results and keep a list of the medicines you take. Where can you learn more? Go to [email or web address]://john.[email or web address] org and sign in to your [employer] account. Enter J787 in the Tal[employer] box to learn more about \"Back Stretches: Exercises. \"     If you do not have an account, please click on the \"Sign Up Now\" link. Current as of: March 2, 2020               Content Version: 12.6  © 1787-3921 [email or web address]. Care instructions adapted under license by [facility] ([facility]). If you have questions about a medical condition or this instruction, always ask your healthcare professional. [address] any warranty or liability for your use of this information.

## 2020-12-01 NOTE — PROGRESS NOTES
TELE HEALTH VISIT (AUDIO-VISUAL)    Pursuant to the emergency declaration under the [facility] waiver authority and the [employer] Act, this Virtual  Visit was conducted, with patient's consent, to reduce the patient's risk of exposure to COVID-19 and provide continuity of care for an established patient. Service is  provided through a video synchronous discussion virtually to substitute for in-person clinic visit. Due to this being a TeleHealth encounter (During [facility] public health emergency), evaluation of the following organ systems was limited: Vitals/Constitutional/EENT/Resp/CV/GI//MS/Neuro/Skin/Rxmu-Kbaz-Sjz. Tamir Cloud  1935  [phone]    Ms. Alaina Gutierres is being seen virtually for a follow up visit using D[employer]. me/G[employer] Video visit/[employer] or face time  Informed verbal consent to the virtual visit was obtained from Ms. Alaina Gutierres. Risks associated with HIPPA compliance with the virtual visit was explained to the patient. Ms. Alaina Gutierres is at her home and Deng CISNEROS is in her office. HISTORY OF PRESENT ILLNESS:  Ms. Alaina Gutierres is a 80 y.o. female  being assessed for a follow up visit for pain management for evaluation of ongoing care regarding her symptoms and monitoring of compliance with long term use high risk medications. She has a diagnosis of   1. Chronic pain syndrome    2. Fibromyalgia    3. Other spondylosis with radiculopathy, lumbar region    4. Lumbar radiculopathy    5. Polyarthropathy, multiple sites    6. Primary osteoarthritis of both hips    7. Neuropathy    8. Primary insomnia    9. Adjustment disorder with mixed anxiety and depressed mood    10. Chronic, continuous use of opioids    11. Therapeutic opioid induced constipation    12. Morbid obesity due to excess calories ([address]    13. Chronic obstructive pulmonary disease, unspecified COPD type ([address]    14.  Pain medication agreement    15. Depression, unspecified depression type    16. Nausea without vomiting      On the Patients Pain Assessment form reviewed with the Medical Assistant:      She complains of pain in the bilateral shoulders,hips, and knees . She rates the pain 8/10 and describes it as aching. Current treatment regimen has helped relieve about 30% of the pain. She denies any side effects from the current pain regimen. Patient reports that since the last follow up visit the physical functioning is unchanged, family/social relationships are unchanged, mood is unchanged sleep patterns are unchanged, and that the overall functioning is unchanged. Patient denies misusing/abusing her narcotic pain medications or using any illegal drugs. Upon obtaining medical history from Ms. Maryellen Mckenize states that pain is manageable on current pain therapy. Reports having nausea for the past 2 days, denies associated vomiting, abdominal pain. Last BM yesterday was normal after taking laxative. Mood is stable without anxiety. Sleep is fair with an average of 5-6 hours. Denies to having issues of constipation. Tolerating activities/house chores with moderate tenderness to the lower back. ALLERGIES: Patients list of allergies were reviewed     MEDICATIONS: Ms. Maryellen Mckenzie list of medications were reviewed. Her current medications are   Outpatient Medications Prior to Visit   Medication Sig Dispense Refill    carvedilol (COREG) 6.25 MG tablet Take 1.5 tablets by mouth 2 times daily 180 tablet 3    amLODIPine (NORVASC) 5 MG tablet TAKE ONE TABLET BY MOUTH DAILY 90 tablet 3    warfarin (COUMADIN) 5 MG tablet TAKE 1 AND 1/2 TABLET BY MOUTH ON MONDAY, WEDNESDAY, AND FRIDAY AND TAKE ONE TABLET BY MOUTH DAILY ON ALL OTHER DAYS 120 tablet 5    DULoxetine (CYMBALTA) 30 MG extended release capsule Take 1 capsule by mouth daily 30 capsule 3    diclofenac sodium (VOLTAREN) 1 % GEL Apply 2 g topically 4 times daily 1 Tube 5    ondansetron (ZOFRAN) 4 MG tablet Take 1 tablet by mouth 3 times daily as needed for Nausea or Vomiting 30 tablet 2    furosemide (LASIX) 40 MG tablet Take 40 mg by mouth daily      potassium citrate (UROCIT-K) 10 MEQ (1080 MG) extended release tablet Take 20 mEq by mouth daily With furosemide      lisinopril (PRINIVIL;ZESTRIL) 20 MG tablet Take 1 tablet by mouth daily 180 tablet 1    albuterol sulfate HFA (PROAIR HFA) 108 (90 Base) MCG/ACT inhaler Inhale 2 puffs into the lungs every 6 hours as needed for Wheezing 1 Inhaler 5    naloxone 4 MG/0.1ML LIQD nasal spray 1 spray by Nasal route as needed for Opioid Reversal 1 each 5    gabapentin (NEURONTIN) 100 MG capsule Take 1 capsule by mouth nightly for 30 days. 30 capsule 0    oxyCODONE-acetaminophen (PERCOCET) 7.5-325 MG per tablet Take 1 tablet by mouth every 8 hours as needed for Pain for up to 30 days. Intended supply: 30 days 90 tablet 0     No facility-administered medications prior to visit. SOCIAL/FAMILY/PAST MEDICAL HISTORY: Ms. Clay Manifold, family and past medical history was reviewed. REVIEW OF SYSTEMS:    Respiratory: Negative for apnea, chest tightness and shortness of breath or change in baseline breathing. Gastrointestinal: Negative for nausea, vomiting, abdominal pain, diarrhea, constipation, blood in stool and abdominal distention. PHYSICAL EXAM:   Nursing note and vitals reviewed. LMP  (LMP Unknown)  as per patient  Constitutional: She appears well-developed and well-nourished. No acute distress. Skin: Skin appears to be warm and dry. No rashes or any other marks noted. She is not diaphoretic. Neurological/Psychiatric:She is alert and oriented to person, place, and time. Coordination is  normal.  Her mood isAppropriate and affect is Neutral/Euthymic(normal). Her behavior is normal.   thought content normal.   Musculoskeletal / Extremities:     IMPRESSION:   1. Chronic pain syndrome    2. Fibromyalgia    3.  Other spondylosis with WEDNESDAY, AND FRIDAY AND TAKE ONE TABLET BY MOUTH DAILY ON ALL OTHER DAYS 120 tablet 5    DULoxetine (CYMBALTA) 30 MG extended release capsule Take 1 capsule by mouth daily 30 capsule 3    diclofenac sodium (VOLTAREN) 1 % GEL Apply 2 g topically 4 times daily 1 Tube 5    ondansetron (ZOFRAN) 4 MG tablet Take 1 tablet by mouth 3 times daily as needed for Nausea or Vomiting 30 tablet 2    furosemide (LASIX) 40 MG tablet Take 40 mg by mouth daily      potassium citrate (UROCIT-K) 10 MEQ (1080 MG) extended release tablet Take 20 mEq by mouth daily With furosemide      lisinopril (PRINIVIL;ZESTRIL) 20 MG tablet Take 1 tablet by mouth daily 180 tablet 1    albuterol sulfate HFA (PROAIR HFA) 108 (90 Base) MCG/ACT inhaler Inhale 2 puffs into the lungs every 6 hours as needed for Wheezing 1 Inhaler 5    naloxone 4 MG/0.1ML LIQD nasal spray 1 spray by Nasal route as needed for Opioid Reversal 1 each 5     No current facility-administered medications for this visit. I will continue her current medication regimen  which is part of the above treatment schedule. It has been helping with Ms. Denis's chronic  medical problems which for this visit include: The primary encounter diagnosis was Chronic pain syndrome. Diagnoses of Fibromyalgia, Other spondylosis with radiculopathy, lumbar region, Lumbar radiculopathy, Polyarthropathy, multiple sites, Primary osteoarthritis of both hips, Neuropathy, Primary insomnia, Adjustment disorder with mixed anxiety and depressed mood, Chronic, continuous use of opioids, Therapeutic opioid induced constipation, Morbid obesity due to excess calories ([address] Chronic obstructive pulmonary disease, unspecified COPD type ([address] Pain medication agreement, Depression, unspecified depression type, and Nausea without vomiting were also pertinent to this visit. Risks and benefits of the medications and other alternative treatments  including no treatment were discussed with the patient. The common side effects of these medications were also explained to the patient. Informed verbal consent was obtained. Goals of current treatment regimen include improvement in pain, restoration of functioning- with focus on improvement in physical performance, general activity, work or disability,emotional distress, health care utilization and  decreased medication consumption. Will continue to monitor progress towards achieving/maintaining therapeutic goals with special emphasis on  1. Improvement in perceived interfernce  of pain with ADL's. Ability to do home exercises independently. Ability to do household chores indoor and/or outdoor work and social and leisure activities. Improve psychosocial and physical functioning. - she is not showing any significant progress/or showing regression  towards this goal and reassessment and adjustment of goals/treatment have been made. She was advised against drinking alcohol with the narcotic pain medicines, advised against driving or handling machinery while adjusting the dose of medicines or if having cognitive  issues related to the current medications. Risk of overdose and death, if medicines not taken as prescribed, were also discussed. If the patient develops new symptoms or if the symptoms worsen, the patient should call the office. While transcribing every attempt was made to maintain the accuracy of the note in terms of it's contents,there may have been some errors made inadvertently. Thank you for allowing me to participate in the care of this patient. Belle Kay.  Deep MAGUIRE-CNP     Cc: Greg Merida MD

## 2020-12-14 NOTE — TELEPHONE ENCOUNTER
Lm for patient to return call. Per Dr Abril Brenner she does not do injections in the back would need to discuss with pain management.

## 2020-12-14 NOTE — TELEPHONE ENCOUNTER
Pt would like to know if Dr. Farshad Stiles would give injections in   Lower back, pt is having pain     Pl advise.    [phone]

## 2020-12-15 NOTE — TELEPHONE ENCOUNTER
Patient informed Dr Minnie Fuller does not do injections in the back. Pt has contacted pain management and they can do the injections.

## 2020-12-22 NOTE — PROGRESS NOTES
Ms. Lissette Linton is a 80 y.o.  female. Ms. Lissette Linton had an INR test today. Results were reviewed and appropriate warfarin management was completed. THIS VISIT WAS COMPLETED AS:   []    A VIRTUAL VISIT VIA TELEPHONE IN EFFORTS TO REDUCE THE SPREAD OF COVID-19.  []    A DRIVE-THRU VISIT IN EFFORTS TO REDUCE THE SPREAD OF COVID-19. [x]    AN IN PERSON VISIT. PROTOCOLS WERE FOLLOWED WITH PRECAUTIONS TO REDUCE THE SPREAD OF COVID-19. Patient verifies current dosing regimen: No     Warfarin medication reviewed and updated on the patient 's home medication list: YES     All other medications reviewed and updated on the patient 's home medication list: No      Lab Results   Component Value Date    INR 2.0 2020    INR 2.8 2020    INR 2.9 2020       Patient Findings     Positives:  Emergency department visit, Change in diet/appetite, Other complaints    Negatives:  Signs/symptoms of thrombosis, Signs/symptoms of bleeding, Laboratory test error suspected, Change in health, Change in alcohol use, Change in activity, Upcoming invasive procedure, Upcoming dental procedure, Missed doses, Extra doses, Change in medications, Hospital admission, Bruising    Comments:  Variable diet  Constipation          Anticoagulation Summary  As of 2020    INR goal:  2.0-3.0   TTR:  64.4 % (6.3 y)   INR used for dosin.0 (2020)   Warfarin maintenance plan:  5 mg (5 mg x 1) every Mon, Wed, Fri; 7.5 mg (5 mg x 1.5) all other days   Weekly warfarin total:  45 mg   Plan last modified:  Anjel Sahni RPH (2019)   Next INR check:  2021   Priority:  Maintenance   Target end date:   Indefinite    Indications    Atrial fibrillation with rapid ventricular response (HCC) [I48.91]             Anticoagulation Episode Summary     INR check location:  Anticoagulation Clinic    Preferred lab:      Send INR reminders to:  [address] [facility] Comments:  EPIC. Eric Mijares Also: CHF. Eric Mijares Ask patient if she want her BP and Pulse checked with each visit. Anticoagulation Care Providers     Provider Role Specialty Phone number    Broderick Miles MD Responsible Cardiology [phone]          Warfarin plan:   Patient states she is unwell. She states she has bad constipation due to the amount of pain medications she takes. We have had a difficult time getting her back to the clinic for appointments. She no longer drives and depends on her daughter for transportation. This makes it difficult for her to get to the clinic for her INR. I gave her options of having her INR at the outpatient lab or she can just stop into the [facility] on Tuesdays or Fridays when I am her and I will see her without an appointment. She had been taking her warfarin on her own schedule and not following the schedule given to her by the clinic. Takes 5 mg daily and takes the 7.5 mg 2-3 times a weeks as she determines her \"need\" to take it. Explained the issues with not following the schedule and she verbalized understanding. Will call this patient in two weeks to determine her status. Description    CONTINUE:  Warfarin 7.5 mg daily except 5 mg on Mondays, Wednesday and Fridays. Drunks Boost multiple times a day. Call clinic with any new medications especially antibiotics and Oral steroids. Patient does not eat much vitamin K greens. Immunization History   Administered Date(s) Administered    Influenza, Quadv, IM, (6 mo and older Fluzone, Flulaval, Fluarix and 3 yrs and older Afluria) 01/09/2017    Influenza, Triv, inactivated, subunit, adjuvanted, IM (Fluad 65 yrs and older) 11/05/2019    Pneumococcal Conjugate 13-valent ([id number]) 07/10/2017    Pneumococcal Polysaccharide ([id number]) 09/22/2006    Td (Adult), 2 Lf Tetanus Toxoid, Pf (Td, Absorbed) 08/20/2012     Reviewed AVS with patient / caregiver. CLINICAL PHARMACY CONSULT: MED RECONCILIATION/REVIEW ADDENDUM    For Pharmacy Admin Tracking Only    PHSO (orange banner): No  Total # of Interventions Recommended (warfarin changes): 0  (warfarin changes)   (other medication updates)- Updated Order #: 0 Updated Order Reason(s):   (#1 for reviewing INR) - Maintenance Safety Lab Monitoring #: 1  Total Interventions Accepted (warfarin related): 0  Time Spent (min) (round up): 15

## 2020-12-22 NOTE — ED NOTES
Pt discharged from ED to home. Pt verbalizes understanding to discharge instructions, teach back successful. Pt denies questions at this time. No acute distress noted. Resp even and unlabored. A/ox4. Pt instructed to follow-up as noted - return to ED if symptoms worsen. Pt verbalizes understanding. Discharged per ED MD with discharge instructions. Pt assisted to lobby via wheelchair with daughter. Daughter denies assistance.         Yony Boateng RN  12/22/20 [id number]

## 2020-12-22 NOTE — ED PROVIDER NOTES
[facility]  eMERGENCY dEPARTMENT eNCOUnter        Pt Name: Edelmiro Lefort  MRN: [id number]  [place] 1935  Date of evaluation: 12/22/2020  Provider: Chastity Ferro MD  PCP: Chong Mohr MD      CHIEF COMPLAINT       Chief Complaint   Patient presents with    Abdominal Pain     left sided. reports \"for weeks. \" +nausea       HISTORY OFPRESENT ILLNESS   (Location/Symptom, Timing/Onset, Context/Setting, Quality, Duration, Modifying Factors,Severity)  Note limiting factors. Edelmiro Lefort is a 80 y.o. female       Location/Symptom: Left-sided abdominal pain and flank pain. Generalized weakness  Timing/Onset: 3 weeks  Context/Setting: Patient does have chronic pain and chronic constipation. She is on Percocet daily. She has chronic pain in her back arms and legs and arthritis. She does state that she was started on one of the medications for opioid-induced constipation but she stated it made it feel like she was going to pass out. She does have a history of shingles but this has been going on for 3 weeks without rash. Quality: Sharp throbbing  Duration: Continuous  Modifying Factors: Really nothing specific. She is not had any injuries. She does state that she is vomiting bile material no fever or chills. She also has COPD chronic atrial fibrillation and she is on Coumadin. Severity: 10 out of 10    Nursing Noteswere all reviewed and agreed with or any disagreements were addressed  in the HPI. REVIEW OF SYSTEMS    (2-9 systems for level 4, 10 or more for level 5)     Review of Systems   Constitutional: Positive for fatigue. Negative for chills and fever. HENT: Negative for ear pain, rhinorrhea and sore throat. Eyes: Negative for pain, discharge and visual disturbance. Respiratory: Positive for cough, shortness of breath and wheezing. Cardiovascular: Positive for chest pain. Negative for palpitations and leg swelling.    Gastrointestinal: Positive for abdominal pain. Negative for diarrhea, nausea and vomiting. Genitourinary: Positive for flank pain. Negative for difficulty urinating, dysuria, pelvic pain and vaginal discharge. Musculoskeletal: Positive for arthralgias, back pain and myalgias. Negative for joint swelling and neck pain. Skin: Negative for rash. Allergic/Immunologic: Negative for environmental allergies. Neurological: Negative for dizziness, seizures, syncope and headaches. Hematological: Negative for adenopathy. Psychiatric/Behavioral: Negative for dysphoric mood and suicidal ideas. The patient is not nervous/anxious. PAST MEDICAL HISTORY     Past Medical History:   Diagnosis Date    A fib     Arthritis     COPD (chronic obstructive [facility] disease) [facility] [address]     Coronary artery disease     [facility]2016    left    Fibromyalgia     GERD (gastroesophageal reflux disease)     History of shingles     Hypertension     PONV (postoperative nausea and vomiting)     Rotator cuff disorder     left     Ulcer of gastroesophageal junction 2006         SURGICAL HISTORY     Past Surgical History:   Procedure Laterality Date    ACROMIOPLASTY Left 4/27/15    Dr Mehrdad Finch  age 21   [address] [facility] COLONOSCOPY  05.01.03    COLONOSCOPY  02/2017    no need for further colonoscopy per Dr. Danielle Woodard 11/1/2018    [facility] Left 01/2016     Left intertrochanteric and shaft femur comminuted fracture, status post Gamma nail and cables.     VT ESOPHAGOGASTRODUODENOSCOPY US SCOPE W/ADJ STRXRS N/A 11/1/2018    ESOPHAGEAL ENDOSCOPIC ULTRASOUND EXAM performed by Sabi Yanes MD at [address] [facility] [address]      right     UPPER GASTROINTESTINAL ENDOSCOPY N/A 11/1/2018    EGD BIOPSY performed by Sabi Yanes MD at [address] [place] [address] [address]       Discharge Medication List as of 12/22/2020  5:35 PM      CONTINUE these medications which have NOT CHANGED    Details   oxyCODONE-acetaminophen (PERCOCET) 7.5-325 MG per tablet Take 1 tablet by mouth every 8 hours as needed for Pain for up to 30 days. Intended supply: 30 days, Disp-90 tablet,R-0Normal      gabapentin (NEURONTIN) 100 MG capsule Take 1 capsule by mouth nightly for 30 days. , Disp-30 capsule,R-0Normal      ondansetron (ZOFRAN) 4 MG tablet Take 1 tablet by mouth daily as needed for Nausea or Vomiting, Disp-30 tablet,R-0Normal      carvedilol (COREG) 6.25 MG tablet Take 1.5 tablets by mouth 2 times daily, Disp-180 tablet,R-3Normal      amLODIPine (NORVASC) 5 MG tablet TAKE ONE TABLET BY MOUTH DAILY, Disp-90 tablet,R-3Normal      warfarin (COUMADIN) 5 MG tablet TAKE 1 AND 1/2 TABLET BY MOUTH ON MONDAY, WEDNESDAY, AND FRIDAY AND TAKE ONE TABLET BY MOUTH DAILY ON ALL OTHER DAYS, Disp-120 tablet,R-5Normal      DULoxetine (CYMBALTA) 30 MG extended release capsule Take 1 capsule by mouth daily, Disp-30 capsule,R-3Normal      diclofenac sodium (VOLTAREN) 1 % GEL Apply 2 g topically 4 times daily, Topical, 4 TIMES DAILY Starting Tue 6/2/2020, Disp-1 Tube, R-5, Normal      furosemide (LASIX) 40 MG tablet Take 40 mg by mouth dailyHistorical Med      potassium citrate (UROCIT-K) 10 MEQ (1080 MG) extended release tablet Take 20 mEq by mouth daily With furosemideHistorical Med      lisinopril (PRINIVIL;ZESTRIL) 20 MG tablet Take 1 tablet by mouth daily, Disp-180 tablet, R-1Normal      albuterol sulfate HFA (PROAIR HFA) 108 (90 Base) MCG/ACT inhaler Inhale 2 puffs into the lungs every 6 hours as needed for Wheezing, Disp-1 Inhaler, R-5Please fill with what is on formulary>>proair, proventil or ventolinPrint      naloxone 4 MG/0.1ML LIQD nasal spray 1 spray by Nasal route as needed for Opioid Reversal, Disp-1 each, R-5Normal             ALLERGIES     Levaquin [levofloxacin in d5w] and Digoxin    FAMILY HISTORY       Family History   Problem Relation Age of Onset    Stroke Mother     COPD Father     Coronary Art Dis Father     Other Brother         AAA rupture    Other Brother         cerebral hemorrhage    Heart Attack Son         cerebral hemorrhage as well    Cancer Sister         x2 sisters     Cancer Sister         ovarian           SOCIAL HISTORY       Social History     Socioeconomic History    Marital status:      Spouse name: None    Number of children: 3    Years of education: None    Highest education level: None   Occupational History    None   Social Needs    Financial resource strain: None    Food insecurity     Worry: None     Inability: None    Transportation needs     Medical: None     Non-medical: None   Tobacco Use    Smoking status: Former Smoker     Packs/day: 1.00     Years: 20.00     Pack years: 20.00     Types: Cigarettes     Quit date: 1980     Years since quittin.0    Smokeless tobacco: Never Used   Substance and Sexual Activity    Alcohol use: No     Alcohol/week: 0.0 standard drinks    Drug use: No    Sexual activity: Not Currently   Lifestyle    Physical activity     Days per week: None     Minutes per session: None    Stress: None   Relationships    Social connections     Talks on phone: None     Gets together: None     Attends Quaker service: None     Active member of club or organization: None     Attends meetings of clubs or organizations: None     Relationship status: None    Intimate partner violence     Fear of current or ex partner: None     Emotionally abused: None     Physically abused: None     Forced sexual activity: None   Other Topics Concern    None   Social History Narrative    None       SCREENINGS             PHYSICAL EXAM    (up to 7 for level 4, 8 or more for level 5)     ED Triage Vitals   BP Temp Temp Source Pulse Resp SpO2 Height Weight   20 1403 20 1403 20 1403 20 1403 12/22/20 1403 12/22/20 1400 -- --   (!) 234/134 98.2 °F (36.8 °C) Oral 97 20 94 %        oral temperature is 98.2 °F (36.8 °C). Her blood pressure is 179/98 (abnormal) and her pulse is 96. Her respiration is 20 and oxygen saturation is 97%. Physical Exam  Constitutional:       Appearance: She is well-developed. She is not diaphoretic. Comments: Patient appears very uncomfortable. HENT:      Head: Normocephalic and atraumatic. Right Ear: External ear normal.      Left Ear: External ear normal.   Eyes:      General: No scleral icterus. Right eye: No discharge. Left eye: No discharge. Neck:      Musculoskeletal: Normal range of motion. Thyroid: No thyromegaly. Vascular: No JVD. Trachea: No tracheal deviation. Cardiovascular:      Rate and Rhythm: Normal rate. Rhythm irregularly irregular. Heart sounds: Normal heart sounds. No murmur. No friction rub. No gallop. Pulmonary:      Effort: Pulmonary effort is normal. Tachypnea present. No respiratory distress. Breath sounds: No stridor. Decreased breath sounds and wheezing present. No rales. Abdominal:      General: There is no distension. Palpations: Abdomen is soft. Tenderness: There is no abdominal tenderness. There is no guarding or rebound. Comments: The pain really is not all that reproducible. She is rolling on the bed somewhat colicky. I do not see a shingles rash. She does have some reproducible tenderness along the left posterior superior iliac crest.  She has a couple of very small areas of ecchymosis that I suspect is due to her pushing on the area to try to help with the pain. Genitourinary:     Comments: Rectal exam performed and chaperoned by the emergency department nurse [facility]. She does not have a fecal impaction. She was having a lot of urinary frequency ever since she got back from CAT scan but I did give her some fluids.   So we did a bladder scan at that point but it only showed about 230 mL. Not quite enough to warrant a Martins catheter. Musculoskeletal:         General: No tenderness. Skin:     General: Skin is warm and dry. Findings: No rash (On exposed body surfaces). Neurological:      Mental Status: She is alert and oriented to person, place, and time. Coordination: Coordination normal.   Psychiatric:         Behavior: Behavior normal.         Thought Content:  Thought content normal.         DIAGNOSTIC RESULTS   LABS:    Results for orders placed or performed during the hospital encounter of 12/22/20   CBC Auto Differential   Result Value Ref Range    WBC 8.4 4.0 - 11.0 K/uL    RBC 4.71 4.00 - 5.20 M/uL    Hemoglobin 14.2 12.0 - 16.0 g/dL    Hematocrit 42.8 36.0 - 48.0 %    MCV 90.8 80.0 - 100.0 fL    MCH 30.1 26.0 - 34.0 pg    MCHC 33.2 31.0 - 36.0 g/dL    RDW 13.6 12.4 - 15.4 %    Platelets 897 802 - 584 K/uL    MPV 8.1 5.0 - 10.5 fL    Neutrophils % 67.6 %    Lymphocytes % 22.2 %    Monocytes % 7.9 %    Eosinophils % 1.3 %    Basophils % 1.0 %    Neutrophils Absolute 5.6 1.7 - 7.7 K/uL    Lymphocytes Absolute 1.9 1.0 - 5.1 K/uL    Monocytes Absolute 0.7 0.0 - 1.3 K/uL    Eosinophils Absolute 0.1 0.0 - 0.6 K/uL    Basophils Absolute 0.1 0.0 - 0.2 K/uL   Comprehensive Metabolic Panel w/ Reflex to MG   Result Value Ref Range    Sodium 139 136 - 145 mmol/L    Potassium reflex Magnesium 4.1 3.5 - 5.1 mmol/L    Chloride 101 99 - 110 mmol/L    CO2 25 21 - 32 mmol/L    Anion Gap 13 3 - 16    Glucose 148 (H) 70 - 99 mg/dL    BUN 13 7 - 20 mg/dL    CREATININE 0.5 (L) 0.6 - 1.2 mg/dL    GFR Non-African American >60 >60    GFR African American >60 >60    Calcium 9.7 8.3 - 10.6 mg/dL    Total Protein 7.3 6.4 - 8.2 g/dL    Alb 4.1 3.4 - 5.0 g/dL    Albumin/Globulin Ratio 1.3 1.1 - 2.2    Total Bilirubin 0.7 0.0 - 1.0 mg/dL    Alkaline Phosphatase 60 40 - 129 U/L    ALT 12 10 - 40 U/L    AST 17 15 - 37 U/L    Globulin 3.2 g/dL   Lipase   Result Value Ref Range    Lipase 13.0 13.0 - 60.0 U/L   Lactic Acid, Plasma   Result Value Ref Range    Lactic Acid 1.5 0.4 - 2.0 mmol/L   Urinalysis Reflex to Culture    Specimen: Urine, clean catch   Result Value Ref Range    Color, UA Yellow Straw/Yellow    Clarity, UA SLCLOUDY Clear    Glucose, Ur Negative Negative mg/dL    Bilirubin Urine Negative Negative    Ketones, Urine Negative Negative mg/dL    Specific Gravity, UA 1.015 1.005 - 1.030    Blood, Urine SMALL (A) Negative    pH, UA 7.0 5.0 - 8.0    Protein, UA Negative Negative mg/dL    Urobilinogen, Urine 0.2 <2.0 E.U./dL    Nitrite, Urine Negative Negative    Leukocyte Esterase, Urine Negative Negative    Microscopic Examination YES     Urine Type Cleancatch     Urine Reflex to Culture Not Indicated    Blood Gas, Venous   Result Value Ref Range    pH, Jhonatan 7.418 7.350 - 7.450    pCO2, Jhonatan 45.2 40.0 - 50.0 mmHg    pO2, Jhonatan 41.3 (H) 25.0 - 40.0 mmHg    HCO3, Venous 29.2 (H) 23.0 - 29.0 mmol/L    Base Excess, Jhonatan 4.7 (H) -3.0 - 3.0 mmol/L    O2 Sat, Jhonatan 77 Not Established %    TC02 (Calc), Jhonatan 29 Not Established mmol/L    O2 Therapy Unknown    Brain Natriuretic Peptide   Result Value Ref Range    Pro- 0 - 449 pg/mL   Troponin   Result Value Ref Range    Troponin <0.01 <0.01 ng/mL   Microscopic Urinalysis   Result Value Ref Range    WBC, UA 3-5 0 - 5 /HPF    RBC, UA 3-4 0 - 4 /HPF    Epithelial Cells, UA 2-5 0 - 5 /HPF    Renal Epithelial, UA 0-1 0 - 1 /HPF    Bacteria, UA 1+ (A) None Seen /HPF   EKG 12 Lead   Result Value Ref Range    Ventricular Rate 92 BPM    Atrial Rate 104 BPM    QRS Duration 88 ms    Q-T Interval 350 ms    QTc Calculation (Bazett) 432 ms    R Axis 36 degrees    T Axis 51 degrees    Diagnosis       Atrial fibrillationLow voltage QRSAbnormal ECGWhen compared with ECG of 09-MAR-2020 15:49,No significant change was foundConfirmed by Carmella JONES MD ([id number]) on 12/22/2020 5:44:07 PM       All other labs were within normal range or not returned as of this dictation. EKG: All EKG's are interpreted by the Emergency Department Physician who either signs orCo-signs this chart in the absence of a cardiologist.    EKG visualized preliminarily interpreted as myself shows atrial fibrillation at a rate of 92. Normal axis of 36. Significant tremor artifact affects interpretation and/or electrical interference. Low voltage EKG. No obvious acute injury or acute ischemia. RADIOLOGY:   Non-plain film images such as CT, Ultrasound and MRI are read by the radiologist. Carrol Layer radiographic images are visualized and preliminarily interpreted by the  EDProvider with the below findings:    Ct Abdomen Pelvis Wo Contrast Additional Contrast? None    Result Date: 12/22/2020  EXAMINATION: CT OF THE ABDOMEN AND PELVIS WITHOUT CONTRAST 12/22/2020 3:07 pm TECHNIQUE: CT of the abdomen and pelvis was performed without the administration of intravenous contrast. Multiplanar reformatted images are provided for review. Dose modulation, iterative reconstruction, and/or weight based adjustment of the mA/kV was utilized to reduce the radiation dose to as low as reasonably achievable. COMPARISON: July 2017 HISTORY: ORDERING SYSTEM PROVIDED HISTORY: r/o SBO, retro peritoneal hematoma TECHNOLOGIST PROVIDED HISTORY: Reason for exam:->r/o SBO, retro peritoneal hematoma Additional Contrast?->None Reason for Exam: pt has left sided abd pain with nausea for the last few days. pt also states she hasn't been able to have a bowel movement Acuity: Acute Type of Exam: Initial Relevant Medical/Surgical History: cholecystectomy, COPD, HTN, esophageal dilation FINDINGS: Lower Chest:The lung bases are clear Organs: The liver, spleen, adrenal glands, kidneys, and pancreas demonstrate no acute abnormality. Cystic lesions in the pancreas are similar to previous exams. These remain nonspecific. GI/Bowel: The visualized portions of the small bowel are unremarkable.  There is extensive colonic diverticulosis with no evidence of acute inflammation. Peritoneum/Retroperitoneum: Unremarkable Pelvis: Unremarkable Bones: No suspicious osseous lesions are identified     No acute intra-abdominal abnormality           PROCEDURES   Unless otherwise noted below, none     Procedures    CRITICAL CARE TIME   N/A    CONSULTS:  None    EMERGENCY DEPARTMENT COURSE and DIFFERENTIAL DIAGNOSIS/MDM:   Vitals:    Vitals:    12/22/20 1535 12/22/20 1545 12/22/20 1630 12/22/20 1715   BP: (!) 170/96 (!) 177/93 (!) 165/83 (!) 179/98   Pulse: 92 94 97 96   Resp: 24 26 15 20   Temp:       TempSrc:       SpO2: 94% 95% 98% 97%       Patient was given the following medications:  Medications   0.9 % sodium chloride infusion (0 mLs Intravenous Stopped 12/22/20 1745)   HYDROmorphone (DILAUDID) injection 0.5 mg (0.5 mg Intravenous Given 12/22/20 1448)   ondansetron (ZOFRAN) injection 4 mg (4 mg Intravenous Given 12/22/20 1448)       She received a small dose of Dilaudid and some Zofran and she is feeling much better. We did give her a breathing treatment. When I got her up though she did drop her saturation to 88% on room air. Other than that I really do not have a strong reason to admit her. There is no evidence of bowel obstruction ischemic bowel diverticulitis retroperitoneal hematoma nephrolithiasis or urinary retention. She does state that this feels worse when she walks around so it might also be musculoskeletal in between her lung disease and this I did think that a brief course of steroids would help. FINAL IMPRESSION      1. COPD exacerbation ([address]    2. Left flank pain    3. Drug-induced constipation    4.  Elevated blood pressure reading in office with diagnosis of hypertension          DISPOSITION/PLAN    DISPOSITION        PATIENT REFERRED TO:  Dante Bianchi MD  [address] [place] [address], [address] [place] [address]  [phone]            DISCHARGE MEDICATIONS:  Discharge Medication List as of 12/22/2020  5:35 PM      START taking these medications    Details   Respiratory Therapy Supplies (NEBULIZER) DARINEL Disp-1 Device, R-0, NormalUsed to give yourself breathing treatment      ipratropium-albuterol (DUONEB) 0.5-2.5 (3) MG/3ML SOLN nebulizer solution Inhale 3 mLs into the lungs every 4 hours as needed for Shortness of Breath (wheezing coughing), Disp-360 mL, R-5Normal      methylPREDNISolone (MEDROL, STEVIE,) 4 MG tablet Take by mouth., Disp-1 kit, R-0Normal      ondansetron (ZOFRAN ODT) 4 MG disintegrating tablet Take 1 tablet by mouth 4 times daily as needed for Nausea or Vomiting, Disp-60 tablet, R-0Normal             DISCONTINUED MEDICATIONS:  Discharge Medication List as of 12/22/2020  5:35 PM                 (Please note that portions of this note were completed with a voice recognition program.  Efforts were made to editthe dictations but occasionally words are mis-transcribed.)    Anna Marie Santiago MD (electronically signed)            Anna Marie Santiago MD  12/22/20 0031

## 2020-12-22 NOTE — ED TRIAGE NOTES
Pt presents to ED with c/o of left sided abdominal pain with nausea worsening over the the last \"few weeks. \" pt also reports associated back pain. pt appears uncomfortable. Pt reports has a GI doctor she follows. Resp even and unlabored. EKG performed on arrival. In Afib- has hx of afib. Call light within reach. Bed in lowest position. Will continue to monitor.

## 2020-12-22 NOTE — ED NOTES
Pt stands at the bedside per request for one moment. Standby assist. Pt O2 sat decreased to 88%. Placed on 2L NC. EDMD aware.       Evelyn Shah RN  12/22/20 0535

## 2020-12-29 NOTE — PROGRESS NOTES
14. Chronic obstructive pulmonary disease, unspecified COPD type ([facility] [address]    15. Pain medication agreement    16. Nausea without vomiting    17. Advanced age      On the Patients Pain Assessment form reviewed with the Medical Assistant:  She complains of pain in the bilateral hips  with radiation to the lower leg Bilateral, and also c/o bilateral shoulder pain . She rates the pain 9/10 and describes it as aching. Current treatment regimen has helped relieve about 10% of the pain. She denies any side effects from the current pain regimen. Patient reports that since the last follow up visit the physical functioning is unchanged, family/social relationships are unchanged, mood is unchanged sleep patterns are unchanged, and that the overall functioning is unchanged. Patient denies misusing/abusing her narcotic pain medications or using any illegal drugs. Upon obtaining medical history from Ms. Eva Hammer states that pain is manageable on current pain therapy. Admits to tenderness in her joints secondary to arthritis, pain medications adequately manages her pain, takes them as prescribed. She was treated in the hospital for COPD exacerbation, currently reports symptoms are stabilized. Mood is stable without anxiety. Sleep is fair with an average of 5-6 hours. Admits to having issues of constipation. Tolerating activities/house chores with moderate tenderness to the lower back/joints. ALLERGIES: Patients list of allergies were reviewed     MEDICATIONS: Ms. Eva Hammer list of medications were reviewed. Her current medications are   Outpatient Medications Prior to Visit   Medication Sig Dispense Refill    Respiratory Therapy Supplies (NEBULIZER) DARINEL Used to give yourself breathing treatment 1 Device 0    ipratropium-albuterol (DUONEB) 0.5-2.5 (3) MG/3ML SOLN nebulizer solution Inhale 3 mLs into the lungs every 4 hours as needed for Shortness of Breath (wheezing coughing) 360 mL 5  ondansetron (ZOFRAN ODT) 4 MG disintegrating tablet Take 1 tablet by mouth 4 times daily as needed for Nausea or Vomiting 60 tablet 0    ondansetron (ZOFRAN) 4 MG tablet Take 1 tablet by mouth daily as needed for Nausea or Vomiting 30 tablet 0    carvedilol (COREG) 6.25 MG tablet Take 1.5 tablets by mouth 2 times daily 180 tablet 3    amLODIPine (NORVASC) 5 MG tablet TAKE ONE TABLET BY MOUTH DAILY 90 tablet 3    warfarin (COUMADIN) 5 MG tablet TAKE 1 AND 1/2 TABLET BY MOUTH ON MONDAY, WEDNESDAY, AND FRIDAY AND TAKE ONE TABLET BY MOUTH DAILY ON ALL OTHER DAYS 120 tablet 5    diclofenac sodium (VOLTAREN) 1 % GEL Apply 2 g topically 4 times daily 1 Tube 5    furosemide (LASIX) 40 MG tablet Take 40 mg by mouth daily      potassium citrate (UROCIT-K) 10 MEQ (1080 MG) extended release tablet Take 20 mEq by mouth daily With furosemide      lisinopril (PRINIVIL;ZESTRIL) 20 MG tablet Take 1 tablet by mouth daily 180 tablet 1    albuterol sulfate HFA (PROAIR HFA) 108 (90 Base) MCG/ACT inhaler Inhale 2 puffs into the lungs every 6 hours as needed for Wheezing 1 Inhaler 5    naloxone 4 MG/0.1ML LIQD nasal spray 1 spray by Nasal route as needed for Opioid Reversal 1 each 5    oxyCODONE-acetaminophen (PERCOCET) 7.5-325 MG per tablet Take 1 tablet by mouth every 8 hours as needed for Pain for up to 30 days. Intended supply: 30 days 90 tablet 0    gabapentin (NEURONTIN) 100 MG capsule Take 1 capsule by mouth nightly for 30 days. 30 capsule 0    DULoxetine (CYMBALTA) 30 MG extended release capsule Take 1 capsule by mouth daily 30 capsule 3     No facility-administered medications prior to visit. SOCIAL/FAMILY/PAST MEDICAL HISTORY: Ms. Deniece Sicard, family and past medical history was reviewed. REVIEW OF SYSTEMS:    Respiratory: Negative for apnea, chest tightness and shortness of breath or change in baseline breathing. Gastrointestinal: Negative for nausea, vomiting, abdominal pain, diarrhea, constipation, blood in stool and abdominal distention. PHYSICAL EXAM:   Nursing note and vitals reviewed. LMP  (LMP Unknown)  as per patient  Constitutional: She appears well-developed and well-nourished. No acute distress. Skin: Skin appears to be warm and dry. No rashes or any other marks noted. She is not diaphoretic. Neurological/Psychiatric:She is alert and oriented to person, place, and time. Coordination is  normal.  Her mood isAppropriate and affect is Neutral/Euthymic(normal). Her behavior is normal.   thought content normal.   Musculoskeletal / Extremities:Gait is normal, assistive devices use: none. IMPRESSION:   1. Chronic pain syndrome    2. Fibromyalgia    3. Other spondylosis with radiculopathy, lumbar region    4. Other idiopathic scoliosis, thoracolumbar region    5. Lumbar radiculopathy    6. Other spondylosis, cervical region    7. Polyarthropathy, multiple sites    8. Primary osteoarthritis of both hips    9. Neuropathy    10. Primary insomnia    11. Adjustment disorder with mixed anxiety and depressed mood    12. Therapeutic opioid induced constipation    13. Morbid obesity due to excess calories ([address]    14. Chronic obstructive pulmonary disease, unspecified COPD type ([address]    15. Pain medication agreement    16. Nausea without vomiting    17.  Advanced age        PLAN:  Informed verbal consent regarding treatment was obtained  -Continue with Percocet, Neurontin, Cymbalta, Narcan  -Medrol dose pack, Miralax 17g daily  -Declined home PT, reviewed home exercises/back stretches with the patient  -CBT techniques- relaxation therapies such as biofeedback, mindfulness based stress reduction, imagery, cognitive restructuring, problem solving discussed with patient  -Reviewed Covid-19 safety regulations which were discussed, patient maintains compliance with the safety guidelines regarding Covid-19  diclofenac sodium (VOLTAREN) 1 % GEL Apply 2 g topically 4 times daily 1 Tube 5    furosemide (LASIX) 40 MG tablet Take 40 mg by mouth daily      potassium citrate (UROCIT-K) 10 MEQ (1080 MG) extended release tablet Take 20 mEq by mouth daily With furosemide      lisinopril (PRINIVIL;ZESTRIL) 20 MG tablet Take 1 tablet by mouth daily 180 tablet 1    albuterol sulfate HFA (PROAIR HFA) 108 (90 Base) MCG/ACT inhaler Inhale 2 puffs into the lungs every 6 hours as needed for Wheezing 1 Inhaler 5    naloxone 4 MG/0.1ML LIQD nasal spray 1 spray by Nasal route as needed for Opioid Reversal 1 each 5     No current facility-administered medications for this visit. I will continue her current medication regimen  which is part of the above treatment schedule. It has been helping with Ms. Denis's chronic  medical problems which for this visit include:   Diagnoses of Chronic pain syndrome, Fibromyalgia, Other spondylosis with radiculopathy, lumbar region, Other idiopathic scoliosis, thoracolumbar region, Lumbar radiculopathy, Other spondylosis, cervical region, Polyarthropathy, multiple sites, Primary osteoarthritis of both hips, Neuropathy, Primary insomnia, Adjustment disorder with mixed anxiety and depressed mood, Therapeutic opioid induced constipation, Morbid obesity due to excess calories (HCC), Chronic obstructive pulmonary disease, unspecified COPD type ([address] Pain medication agreement, Nausea without vomiting, and Advanced age were pertinent to this visit. Risks and benefits of the medications and other alternative treatments  including no treatment were discussed with the patient. The common side effects of these medications were also explained to the patient. Informed verbal consent was obtained. Goals of current treatment regimen include improvement in pain, restoration of functioning- with focus on improvement in physical performance, general activity, work or disability,emotional distress, health care utilization and  decreased medication consumption. Will continue to monitor progress towards achieving/maintaining therapeutic goals with special emphasis on  1. Improvement in perceived interfernce  of pain with ADL's. Ability to do home exercises independently. Ability to do household chores indoor and/or outdoor work and social and leisure activities. Improve psychosocial and physical functioning. - she is showing progression towards this treatment goal with the current regimen. She was advised against drinking alcohol with the narcotic pain medicines, advised against driving or handling machinery while adjusting the dose of medicines or if having cognitive  issues related to the current medications. Risk of overdose and death, if medicines not taken as prescribed, were also discussed. If the patient develops new symptoms or if the symptoms worsen, the patient should call the office. While transcribing every attempt was made to maintain the accuracy of the note in terms of it's contents,there may have been some errors made inadvertently. Thank you for allowing me to participate in the care of this patient. [facility].  Vito MAGUIRE-CNP     Cc: Adrien Leyden, MD

## 2020-12-29 NOTE — PATIENT INSTRUCTIONS
Patient Education        Back Stretches: Exercises  Introduction  Here are some examples of exercises for stretching your back. Start each exercise slowly. Ease off the exercise if you start to have pain. Your doctor or physical therapist will tell you when you can start these exercises and which ones will work best for you. How to do the exercises  Overhead stretch   1. Stand comfortably with your feet shoulder-width apart. 2. Looking straight ahead, raise both arms over your head and reach toward the ceiling. Do not allow your head to tilt back. 3. Hold for 15 to 30 seconds, then lower your arms to your sides. 4. Repeat 2 to 4 times. Side stretch   1. Stand comfortably with your feet shoulder-width apart. 2. Raise one arm over your head, and then lean to the other side. 3. Slide your hand down your leg as you let the weight of your arm gently stretch your side muscles. Hold for 15 to 30 seconds. 4. Repeat 2 to 4 times on each side. Press-up   1. Lie on your stomach, supporting your body with your forearms. 2. Press your elbows down into the floor to raise your upper back. As you do this, relax your stomach muscles and allow your back to arch without using your back muscles. As your press up, do not let your hips or pelvis come off the floor. 3. Hold for 15 to 30 seconds, then relax. 4. Repeat 2 to 4 times. Relax and rest   1. Lie on your back with a rolled towel under your neck and a pillow under your knees. Extend your arms comfortably to your sides. 2. Relax and breathe normally. 3. Remain in this position for about 10 minutes. 4. If you can, do this 2 or 3 times each day. Follow-up care is a key part of your treatment and safety. Be sure to make and go to all appointments, and call your doctor if you are having problems. It's also a good idea to know your test results and keep a list of the medicines you take. Where can you learn more? Go to [email or web address] and sign in to your [employer] account. Enter M266 in the [employer] box to learn more about \"Back Stretches: Exercises. \"     If you do not have an account, please click on the \"Sign Up Now\" link. Current as of: March 2, 2020               Content Version: 12.6  © 5810-3916 [employer]. Care instructions adapted under license by [facility] ([facility]). If you have questions about a medical condition or this instruction, always ask your healthcare professional. [address] any warranty or liability for your use of this information.

## 2021-01-01 ENCOUNTER — VIRTUAL VISIT (OUTPATIENT)
Dept: PAIN MANAGEMENT | Age: 86
End: 2021-01-01
Payer: MEDICARE

## 2021-01-01 ENCOUNTER — HOSPITAL ENCOUNTER (EMERGENCY)
Age: 86
Discharge: HOME | End: 2021-02-19
Attending: EMERGENCY MEDICINE
Payer: MEDICARE

## 2021-01-01 ENCOUNTER — APPOINTMENT (OUTPATIENT)
Dept: GENERAL RADIOLOGY | Age: 86
End: 2021-01-01
Payer: MEDICARE

## 2021-01-01 ENCOUNTER — APPOINTMENT (OUTPATIENT)
Dept: CT IMAGING | Age: 86
DRG: 064 | End: 2021-01-01
Attending: INTERNAL MEDICINE
Payer: MEDICARE

## 2021-01-01 ENCOUNTER — APPOINTMENT (OUTPATIENT)
Dept: CT IMAGING | Age: 86
End: 2021-01-01
Payer: MEDICARE

## 2021-01-01 ENCOUNTER — OFFICE VISIT (OUTPATIENT)
Dept: CARDIOLOGY CLINIC | Age: 86
End: 2021-01-01
Payer: MEDICARE

## 2021-01-01 ENCOUNTER — HOSPITAL ENCOUNTER (INPATIENT)
Age: 86
LOS: 1 days | Discharge: HOSPICE | DRG: 064 | End: 2021-02-20
Attending: INTERNAL MEDICINE | Admitting: INTERNAL MEDICINE
Payer: MEDICARE

## 2021-01-01 ENCOUNTER — TELEPHONE (OUTPATIENT)
Dept: PHARMACY | Age: 86
End: 2021-01-01

## 2021-01-01 VITALS
BODY MASS INDEX: 37.03 KG/M2 | HEART RATE: 84 BPM | SYSTOLIC BLOOD PRESSURE: 154 MMHG | TEMPERATURE: 98.6 F | DIASTOLIC BLOOD PRESSURE: 80 MMHG | WEIGHT: 209 LBS | OXYGEN SATURATION: 93 % | HEIGHT: 63 IN

## 2021-01-01 VITALS
BODY MASS INDEX: 37.57 KG/M2 | HEART RATE: 81 BPM | RESPIRATION RATE: 16 BRPM | DIASTOLIC BLOOD PRESSURE: 59 MMHG | TEMPERATURE: 97.7 F | WEIGHT: 212.08 LBS | SYSTOLIC BLOOD PRESSURE: 96 MMHG | OXYGEN SATURATION: 99 %

## 2021-01-01 VITALS
HEIGHT: 63 IN | SYSTOLIC BLOOD PRESSURE: 117 MMHG | TEMPERATURE: 98 F | DIASTOLIC BLOOD PRESSURE: 60 MMHG | HEART RATE: 105 BPM | BODY MASS INDEX: 35.55 KG/M2 | WEIGHT: 200.62 LBS | OXYGEN SATURATION: 90 % | RESPIRATION RATE: 28 BRPM

## 2021-01-01 DIAGNOSIS — R06.09 DOE (DYSPNEA ON EXERTION): ICD-10-CM

## 2021-01-01 DIAGNOSIS — M47.26 OTHER SPONDYLOSIS WITH RADICULOPATHY, LUMBAR REGION: ICD-10-CM

## 2021-01-01 DIAGNOSIS — I61.9 INTRAPARENCHYMAL HEMORRHAGE OF BRAIN (HCC): ICD-10-CM

## 2021-01-01 DIAGNOSIS — M13.0 POLYARTHROPATHY, MULTIPLE SITES: ICD-10-CM

## 2021-01-01 DIAGNOSIS — M79.7 FIBROMYALGIA: ICD-10-CM

## 2021-01-01 DIAGNOSIS — E78.5 HYPERLIPIDEMIA, UNSPECIFIED HYPERLIPIDEMIA TYPE: ICD-10-CM

## 2021-01-01 DIAGNOSIS — R63.5 WEIGHT GAIN: ICD-10-CM

## 2021-01-01 DIAGNOSIS — M41.25 OTHER IDIOPATHIC SCOLIOSIS, THORACOLUMBAR REGION: ICD-10-CM

## 2021-01-01 DIAGNOSIS — M47.892 OTHER SPONDYLOSIS, CERVICAL REGION: ICD-10-CM

## 2021-01-01 DIAGNOSIS — G62.9 NEUROPATHY: ICD-10-CM

## 2021-01-01 DIAGNOSIS — I10 ESSENTIAL HYPERTENSION: ICD-10-CM

## 2021-01-01 DIAGNOSIS — I48.91 ATRIAL FIBRILLATION WITH RAPID VENTRICULAR RESPONSE (HCC): Primary | ICD-10-CM

## 2021-01-01 DIAGNOSIS — R60.0 BILATERAL LEG EDEMA: ICD-10-CM

## 2021-01-01 DIAGNOSIS — M16.0 PRIMARY OSTEOARTHRITIS OF BOTH HIPS: ICD-10-CM

## 2021-01-01 DIAGNOSIS — R41.82 ALTERED MENTAL STATUS, UNSPECIFIED ALTERED MENTAL STATUS TYPE: Primary | ICD-10-CM

## 2021-01-01 DIAGNOSIS — M54.16 LUMBAR RADICULOPATHY: ICD-10-CM

## 2021-01-01 DIAGNOSIS — G89.4 CHRONIC PAIN SYNDROME: ICD-10-CM

## 2021-01-01 DIAGNOSIS — R06.02 SHORTNESS OF BREATH AT REST: ICD-10-CM

## 2021-01-01 LAB
A/G RATIO: 1.1 (ref 1.1–2.2)
ABO/RH: NORMAL
ALBUMIN SERPL-MCNC: 3.9 G/DL (ref 3.4–5)
ALP BLD-CCNC: 79 U/L (ref 40–129)
ALT SERPL-CCNC: 23 U/L (ref 10–40)
AMPHETAMINE SCREEN, URINE: ABNORMAL
ANION GAP SERPL CALCULATED.3IONS-SCNC: 10 MMOL/L (ref 3–16)
ANION GAP SERPL CALCULATED.3IONS-SCNC: 11 MMOL/L (ref 3–16)
ANION GAP SERPL CALCULATED.3IONS-SCNC: 13 MMOL/L (ref 3–16)
ANTIBODY SCREEN: NORMAL
AST SERPL-CCNC: 18 U/L (ref 15–37)
BARBITURATE SCREEN URINE: ABNORMAL
BASE EXCESS ARTERIAL: 5 (ref -3–3)
BASOPHILS ABSOLUTE: 0.1 K/UL (ref 0–0.2)
BASOPHILS RELATIVE PERCENT: 0.4 %
BENZODIAZEPINE SCREEN, URINE: ABNORMAL
BILIRUB SERPL-MCNC: 1.3 MG/DL (ref 0–1)
BILIRUBIN URINE: NEGATIVE
BLOOD CULTURE, ROUTINE: NORMAL
BLOOD, URINE: NEGATIVE
BUN BLDV-MCNC: 18 MG/DL (ref 7–20)
BUN BLDV-MCNC: 26 MG/DL (ref 7–20)
BUN BLDV-MCNC: 36 MG/DL (ref 7–20)
CALCIUM IONIZED: 1.08 MMOL/L (ref 1.12–1.32)
CALCIUM SERPL-MCNC: 9 MG/DL (ref 8.3–10.6)
CALCIUM SERPL-MCNC: 9.4 MG/DL (ref 8.3–10.6)
CALCIUM SERPL-MCNC: 9.5 MG/DL (ref 8.3–10.6)
CANNABINOID SCREEN URINE: ABNORMAL
CHLORIDE BLD-SCNC: 95 MMOL/L (ref 99–110)
CHLORIDE BLD-SCNC: 97 MMOL/L (ref 99–110)
CHLORIDE BLD-SCNC: 97 MMOL/L (ref 99–110)
CLARITY: CLEAR
CO2: 26 MMOL/L (ref 21–32)
CO2: 29 MMOL/L (ref 21–32)
CO2: 30 MMOL/L (ref 21–32)
COCAINE METABOLITE SCREEN URINE: ABNORMAL
COLOR: ABNORMAL
CREAT SERPL-MCNC: 0.6 MG/DL (ref 0.6–1.2)
CREAT SERPL-MCNC: 0.7 MG/DL (ref 0.6–1.2)
CREAT SERPL-MCNC: 0.9 MG/DL (ref 0.6–1.2)
CULTURE, BLOOD 2: NORMAL
EKG ATRIAL RATE: 58 BPM
EKG DIAGNOSIS: NORMAL
EKG Q-T INTERVAL: 358 MS
EKG QRS DURATION: 92 MS
EKG QTC CALCULATION (BAZETT): 454 MS
EKG R AXIS: 52 DEGREES
EKG T AXIS: 51 DEGREES
EKG VENTRICULAR RATE: 97 BPM
EOSINOPHILS ABSOLUTE: 0 K/UL (ref 0–0.6)
EOSINOPHILS RELATIVE PERCENT: 0 %
EPITHELIAL CELLS, UA: 1 /HPF (ref 0–5)
GFR AFRICAN AMERICAN: >60
GFR NON-AFRICAN AMERICAN: 59
GFR NON-AFRICAN AMERICAN: >60
GFR NON-AFRICAN AMERICAN: >60
GLOBULIN: 3.6 G/DL
GLUCOSE BLD-MCNC: 130 MG/DL (ref 70–99)
GLUCOSE BLD-MCNC: 154 MG/DL (ref 70–99)
GLUCOSE BLD-MCNC: 177 MG/DL (ref 70–99)
GLUCOSE BLD-MCNC: 188 MG/DL (ref 70–99)
GLUCOSE BLD-MCNC: 193 MG/DL (ref 70–99)
GLUCOSE URINE: 250 MG/DL
HCO3 ARTERIAL: 28.3 MMOL/L (ref 21–29)
HCT VFR BLD CALC: 41.9 % (ref 36–48)
HCT VFR BLD CALC: 46.6 % (ref 36–48)
HEMOGLOBIN: 13.9 G/DL (ref 12–16)
HEMOGLOBIN: 15.5 G/DL (ref 12–16)
HYALINE CASTS: 1 /LPF (ref 0–8)
INR BLD: 1.8 (ref 0.86–1.14)
KETONES, URINE: 15 MG/DL
LACTATE: 1.28 MMOL/L (ref 0.4–2)
LACTIC ACID: 2.5 MMOL/L (ref 0.4–2)
LEUKOCYTE ESTERASE, URINE: NEGATIVE
LIPASE: 16 U/L (ref 13–60)
LYMPHOCYTES ABSOLUTE: 1.6 K/UL (ref 1–5.1)
LYMPHOCYTES RELATIVE PERCENT: 8.9 %
Lab: ABNORMAL
MCH RBC QN AUTO: 29.8 PG (ref 26–34)
MCH RBC QN AUTO: 30.2 PG (ref 26–34)
MCHC RBC AUTO-ENTMCNC: 33.1 G/DL (ref 31–36)
MCHC RBC AUTO-ENTMCNC: 33.2 G/DL (ref 31–36)
MCV RBC AUTO: 89.5 FL (ref 80–100)
MCV RBC AUTO: 91.1 FL (ref 80–100)
METHADONE SCREEN, URINE: ABNORMAL
MICROSCOPIC EXAMINATION: YES
MONOCYTES ABSOLUTE: 1.2 K/UL (ref 0–1.3)
MONOCYTES RELATIVE PERCENT: 6.6 %
NEUTROPHILS ABSOLUTE: 15.3 K/UL (ref 1.7–7.7)
NEUTROPHILS RELATIVE PERCENT: 84.1 %
NITRITE, URINE: NEGATIVE
O2 SAT, ARTERIAL: 99 % (ref 93–100)
OPIATE SCREEN URINE: ABNORMAL
OXYCODONE URINE: POSITIVE
PCO2 ARTERIAL: 37.1 MM HG (ref 35–45)
PDW BLD-RTO: 14.5 % (ref 12.4–15.4)
PDW BLD-RTO: 14.7 % (ref 12.4–15.4)
PERFORMED ON: ABNORMAL
PERFORMED ON: ABNORMAL
PH ARTERIAL: 7.49 (ref 7.35–7.45)
PH UA: 7
PH UA: 7 (ref 5–8)
PHENCYCLIDINE SCREEN URINE: ABNORMAL
PLATELET # BLD: 243 K/UL (ref 135–450)
PLATELET # BLD: 364 K/UL (ref 135–450)
PMV BLD AUTO: 8.1 FL (ref 5–10.5)
PMV BLD AUTO: 8.6 FL (ref 5–10.5)
PO2 ARTERIAL: 136.6 MM HG (ref 75–108)
POC POTASSIUM: 3.4 MMOL/L (ref 3.5–5.1)
POC SAMPLE TYPE: ABNORMAL
POC SODIUM: 135 MMOL/L (ref 136–145)
POTASSIUM SERPL-SCNC: 3.8 MMOL/L (ref 3.5–5.1)
POTASSIUM SERPL-SCNC: 3.8 MMOL/L (ref 3.5–5.1)
POTASSIUM SERPL-SCNC: 4.9 MMOL/L (ref 3.5–5.1)
PRO-BNP: 299 PG/ML (ref 0–449)
PRO-BNP: 467 PG/ML (ref 0–449)
PROPOXYPHENE SCREEN: ABNORMAL
PROTEIN UA: 30 MG/DL
PROTHROMBIN TIME: 21 SEC (ref 10–13.2)
RBC # BLD: 4.59 M/UL (ref 4–5.2)
RBC # BLD: 5.21 M/UL (ref 4–5.2)
RBC UA: 7 /HPF (ref 0–4)
REASON FOR REJECTION: NORMAL
REJECTED TEST: NORMAL
SARS-COV-2, NAAT: NOT DETECTED
SARS-COV-2, NAAT: NOT DETECTED
SODIUM BLD-SCNC: 135 MMOL/L (ref 136–145)
SODIUM BLD-SCNC: 136 MMOL/L (ref 136–145)
SODIUM BLD-SCNC: 137 MMOL/L (ref 136–145)
SPECIFIC GRAVITY UA: 1.03 (ref 1–1.03)
TCO2 ARTERIAL: 29 MMOL/L
TOTAL PROTEIN: 7.5 G/DL (ref 6.4–8.2)
TROPONIN: <0.01 NG/ML
URINE REFLEX TO CULTURE: ABNORMAL
URINE TYPE: ABNORMAL
UROBILINOGEN, URINE: 1 E.U./DL
WBC # BLD: 17.9 K/UL (ref 4–11)
WBC # BLD: 18.2 K/UL (ref 4–11)
WBC UA: 1 /HPF (ref 0–5)

## 2021-01-01 PROCEDURE — 36569 INSJ PICC 5 YR+ W/O IMAGING: CPT

## 2021-01-01 PROCEDURE — 96376 TX/PRO/DX INJ SAME DRUG ADON: CPT

## 2021-01-01 PROCEDURE — G8400 PT W/DXA NO RESULTS DOC: HCPCS | Performed by: NURSE PRACTITIONER

## 2021-01-01 PROCEDURE — 2580000003 HC RX 258: Performed by: EMERGENCY MEDICINE

## 2021-01-01 PROCEDURE — 1123F ACP DISCUSS/DSCN MKR DOCD: CPT | Performed by: INTERNAL MEDICINE

## 2021-01-01 PROCEDURE — 86850 RBC ANTIBODY SCREEN: CPT

## 2021-01-01 PROCEDURE — 94002 VENT MGMT INPAT INIT DAY: CPT

## 2021-01-01 PROCEDURE — G8417 CALC BMI ABV UP PARAM F/U: HCPCS | Performed by: INTERNAL MEDICINE

## 2021-01-01 PROCEDURE — 6360000002 HC RX W HCPCS: Performed by: STUDENT IN AN ORGANIZED HEALTH CARE EDUCATION/TRAINING PROGRAM

## 2021-01-01 PROCEDURE — 93005 ELECTROCARDIOGRAM TRACING: CPT

## 2021-01-01 PROCEDURE — 83880 ASSAY OF NATRIURETIC PEPTIDE: CPT

## 2021-01-01 PROCEDURE — 99292 CRITICAL CARE ADDL 30 MIN: CPT

## 2021-01-01 PROCEDURE — 83605 ASSAY OF LACTIC ACID: CPT

## 2021-01-01 PROCEDURE — 71045 X-RAY EXAM CHEST 1 VIEW: CPT

## 2021-01-01 PROCEDURE — 85025 COMPLETE CBC W/AUTO DIFF WBC: CPT

## 2021-01-01 PROCEDURE — G8427 DOCREV CUR MEDS BY ELIG CLIN: HCPCS | Performed by: INTERNAL MEDICINE

## 2021-01-01 PROCEDURE — 93010 ELECTROCARDIOGRAM REPORT: CPT | Performed by: INTERNAL MEDICINE

## 2021-01-01 PROCEDURE — 4040F PNEUMOC VAC/ADMIN/RCVD: CPT | Performed by: INTERNAL MEDICINE

## 2021-01-01 PROCEDURE — G8400 PT W/DXA NO RESULTS DOC: HCPCS | Performed by: INTERNAL MEDICINE

## 2021-01-01 PROCEDURE — 82803 BLOOD GASES ANY COMBINATION: CPT

## 2021-01-01 PROCEDURE — 85027 COMPLETE CBC AUTOMATED: CPT

## 2021-01-01 PROCEDURE — 96365 THER/PROPH/DIAG IV INF INIT: CPT

## 2021-01-01 PROCEDURE — 2700000000 HC OXYGEN THERAPY PER DAY

## 2021-01-01 PROCEDURE — 70450 CT HEAD/BRAIN W/O DYE: CPT

## 2021-01-01 PROCEDURE — 31500 INSERT EMERGENCY AIRWAY: CPT

## 2021-01-01 PROCEDURE — 4040F PNEUMOC VAC/ADMIN/RCVD: CPT | Performed by: NURSE PRACTITIONER

## 2021-01-01 PROCEDURE — 83690 ASSAY OF LIPASE: CPT

## 2021-01-01 PROCEDURE — 99291 CRITICAL CARE FIRST HOUR: CPT

## 2021-01-01 PROCEDURE — 94003 VENT MGMT INPAT SUBQ DAY: CPT

## 2021-01-01 PROCEDURE — G8427 DOCREV CUR MEDS BY ELIG CLIN: HCPCS | Performed by: NURSE PRACTITIONER

## 2021-01-01 PROCEDURE — 99214 OFFICE O/P EST MOD 30 MIN: CPT | Performed by: INTERNAL MEDICINE

## 2021-01-01 PROCEDURE — 87635 SARS-COV-2 COVID-19 AMP PRB: CPT

## 2021-01-01 PROCEDURE — 80048 BASIC METABOLIC PNL TOTAL CA: CPT

## 2021-01-01 PROCEDURE — 94761 N-INVAS EAR/PLS OXIMETRY MLT: CPT

## 2021-01-01 PROCEDURE — 99291 CRITICAL CARE FIRST HOUR: CPT | Performed by: INTERNAL MEDICINE

## 2021-01-01 PROCEDURE — 86900 BLOOD TYPING SEROLOGIC ABO: CPT

## 2021-01-01 PROCEDURE — 84132 ASSAY OF SERUM POTASSIUM: CPT

## 2021-01-01 PROCEDURE — 2500000003 HC RX 250 WO HCPCS: Performed by: EMERGENCY MEDICINE

## 2021-01-01 PROCEDURE — 86901 BLOOD TYPING SEROLOGIC RH(D): CPT

## 2021-01-01 PROCEDURE — 1090F PRES/ABSN URINE INCON ASSESS: CPT | Performed by: NURSE PRACTITIONER

## 2021-01-01 PROCEDURE — 6360000002 HC RX W HCPCS: Performed by: EMERGENCY MEDICINE

## 2021-01-01 PROCEDURE — 1090F PRES/ABSN URINE INCON ASSESS: CPT | Performed by: INTERNAL MEDICINE

## 2021-01-01 PROCEDURE — 81001 URINALYSIS AUTO W/SCOPE: CPT

## 2021-01-01 PROCEDURE — 1123F ACP DISCUSS/DSCN MKR DOCD: CPT | Performed by: NURSE PRACTITIONER

## 2021-01-01 PROCEDURE — 82947 ASSAY GLUCOSE BLOOD QUANT: CPT

## 2021-01-01 PROCEDURE — G8484 FLU IMMUNIZE NO ADMIN: HCPCS | Performed by: INTERNAL MEDICINE

## 2021-01-01 PROCEDURE — 99213 OFFICE O/P EST LOW 20 MIN: CPT | Performed by: NURSE PRACTITIONER

## 2021-01-01 PROCEDURE — 6370000000 HC RX 637 (ALT 250 FOR IP): Performed by: STUDENT IN AN ORGANIZED HEALTH CARE EDUCATION/TRAINING PROGRAM

## 2021-01-01 PROCEDURE — 2000000000 HC ICU R&B

## 2021-01-01 PROCEDURE — 96375 TX/PRO/DX INJ NEW DRUG ADDON: CPT

## 2021-01-01 PROCEDURE — 85610 PROTHROMBIN TIME: CPT

## 2021-01-01 PROCEDURE — 84484 ASSAY OF TROPONIN QUANT: CPT

## 2021-01-01 PROCEDURE — 99285 EMERGENCY DEPT VISIT HI MDM: CPT

## 2021-01-01 PROCEDURE — 96368 THER/DIAG CONCURRENT INF: CPT

## 2021-01-01 PROCEDURE — 0BH17EZ INSERTION OF ENDOTRACHEAL AIRWAY INTO TRACHEA, VIA NATURAL OR ARTIFICIAL OPENING: ICD-10-PCS | Performed by: STUDENT IN AN ORGANIZED HEALTH CARE EDUCATION/TRAINING PROGRAM

## 2021-01-01 PROCEDURE — 2580000003 HC RX 258: Performed by: STUDENT IN AN ORGANIZED HEALTH CARE EDUCATION/TRAINING PROGRAM

## 2021-01-01 PROCEDURE — 5A1935Z RESPIRATORY VENTILATION, LESS THAN 24 CONSECUTIVE HOURS: ICD-10-PCS | Performed by: STUDENT IN AN ORGANIZED HEALTH CARE EDUCATION/TRAINING PROGRAM

## 2021-01-01 PROCEDURE — 87040 BLOOD CULTURE FOR BACTERIA: CPT

## 2021-01-01 PROCEDURE — 80307 DRUG TEST PRSMV CHEM ANLYZR: CPT

## 2021-01-01 PROCEDURE — 93000 ELECTROCARDIOGRAM COMPLETE: CPT | Performed by: INTERNAL MEDICINE

## 2021-01-01 PROCEDURE — 1036F TOBACCO NON-USER: CPT | Performed by: INTERNAL MEDICINE

## 2021-01-01 PROCEDURE — 82330 ASSAY OF CALCIUM: CPT

## 2021-01-01 PROCEDURE — 80053 COMPREHEN METABOLIC PANEL: CPT

## 2021-01-01 PROCEDURE — 84295 ASSAY OF SERUM SODIUM: CPT

## 2021-01-01 RX ORDER — SODIUM CHLORIDE 0.9 % (FLUSH) 0.9 %
10 SYRINGE (ML) INJECTION EVERY 12 HOURS SCHEDULED
Status: DISCONTINUED | OUTPATIENT
Start: 2021-01-01 | End: 2021-01-01 | Stop reason: HOSPADM

## 2021-01-01 RX ORDER — LORAZEPAM 2 MG/ML
1 INJECTION INTRAMUSCULAR EVERY 6 HOURS PRN
Status: DISCONTINUED | OUTPATIENT
Start: 2021-01-01 | End: 2021-01-01 | Stop reason: HOSPADM

## 2021-01-01 RX ORDER — FUROSEMIDE 40 MG/1
40 TABLET ORAL DAILY
Qty: 30 TABLET | Refills: 6 | Status: SHIPPED | OUTPATIENT
Start: 2021-01-01

## 2021-01-01 RX ORDER — POTASSIUM CHLORIDE 20 MEQ/1
20 TABLET, EXTENDED RELEASE ORAL DAILY
Qty: 30 TABLET | Refills: 6 | Status: SHIPPED | OUTPATIENT
Start: 2021-01-01

## 2021-01-01 RX ORDER — HYDRALAZINE HYDROCHLORIDE 20 MG/ML
5 INJECTION INTRAMUSCULAR; INTRAVENOUS EVERY 6 HOURS PRN
Status: DISCONTINUED | OUTPATIENT
Start: 2021-01-01 | End: 2021-01-01 | Stop reason: HOSPADM

## 2021-01-01 RX ORDER — GABAPENTIN 100 MG/1
100 CAPSULE ORAL NIGHTLY
Qty: 30 CAPSULE | Refills: 0 | Status: SHIPPED | OUTPATIENT
Start: 2021-01-01 | End: 2021-02-25

## 2021-01-01 RX ORDER — DEXTROSE MONOHYDRATE 25 G/50ML
12.5 INJECTION, SOLUTION INTRAVENOUS PRN
Status: DISCONTINUED | OUTPATIENT
Start: 2021-01-01 | End: 2021-01-01 | Stop reason: HOSPADM

## 2021-01-01 RX ORDER — SUCCINYLCHOLINE CHLORIDE 20 MG/ML
100 INJECTION INTRAMUSCULAR; INTRAVENOUS ONCE
Status: COMPLETED | OUTPATIENT
Start: 2021-01-01 | End: 2021-01-01

## 2021-01-01 RX ORDER — INSULIN LISPRO 100 [IU]/ML
0-6 INJECTION, SOLUTION INTRAVENOUS; SUBCUTANEOUS EVERY 6 HOURS
Status: DISCONTINUED | OUTPATIENT
Start: 2021-01-01 | End: 2021-01-01 | Stop reason: HOSPADM

## 2021-01-01 RX ORDER — DEXTROSE MONOHYDRATE 50 MG/ML
100 INJECTION, SOLUTION INTRAVENOUS PRN
Status: DISCONTINUED | OUTPATIENT
Start: 2021-01-01 | End: 2021-01-01 | Stop reason: HOSPADM

## 2021-01-01 RX ORDER — ETOMIDATE 2 MG/ML
0.3 INJECTION INTRAVENOUS ONCE
Status: COMPLETED | OUTPATIENT
Start: 2021-01-01 | End: 2021-01-01

## 2021-01-01 RX ORDER — MORPHINE SULFATE 2 MG/ML
2 INJECTION, SOLUTION INTRAMUSCULAR; INTRAVENOUS
Status: DISCONTINUED | OUTPATIENT
Start: 2021-01-01 | End: 2021-01-01 | Stop reason: HOSPADM

## 2021-01-01 RX ORDER — ACETAMINOPHEN 325 MG/1
650 TABLET ORAL EVERY 4 HOURS PRN
Status: DISCONTINUED | OUTPATIENT
Start: 2021-01-01 | End: 2021-01-01 | Stop reason: HOSPADM

## 2021-01-01 RX ORDER — LABETALOL HYDROCHLORIDE 5 MG/ML
10 INJECTION, SOLUTION INTRAVENOUS
Status: COMPLETED | OUTPATIENT
Start: 2021-01-01 | End: 2021-01-01

## 2021-01-01 RX ORDER — SODIUM CHLORIDE 0.9 % (FLUSH) 0.9 %
10 SYRINGE (ML) INJECTION PRN
Status: DISCONTINUED | OUTPATIENT
Start: 2021-01-01 | End: 2021-01-01 | Stop reason: HOSPADM

## 2021-01-01 RX ORDER — ONDANSETRON 2 MG/ML
4 INJECTION INTRAMUSCULAR; INTRAVENOUS EVERY 6 HOURS PRN
Status: DISCONTINUED | OUTPATIENT
Start: 2021-01-01 | End: 2021-01-01 | Stop reason: HOSPADM

## 2021-01-01 RX ORDER — MORPHINE SULFATE 4 MG/ML
4 INJECTION, SOLUTION INTRAMUSCULAR; INTRAVENOUS
Status: DISCONTINUED | OUTPATIENT
Start: 2021-01-01 | End: 2021-01-01 | Stop reason: HOSPADM

## 2021-01-01 RX ORDER — LABETALOL HYDROCHLORIDE 5 MG/ML
10 INJECTION, SOLUTION INTRAVENOUS ONCE
Status: COMPLETED | OUTPATIENT
Start: 2021-01-01 | End: 2021-01-01

## 2021-01-01 RX ORDER — PROPOFOL 10 MG/ML
10 INJECTION, EMULSION INTRAVENOUS
Status: DISCONTINUED | OUTPATIENT
Start: 2021-01-01 | End: 2021-01-01 | Stop reason: HOSPADM

## 2021-01-01 RX ORDER — PROMETHAZINE HYDROCHLORIDE 25 MG/1
12.5 TABLET ORAL EVERY 6 HOURS PRN
Status: DISCONTINUED | OUTPATIENT
Start: 2021-01-01 | End: 2021-01-01 | Stop reason: HOSPADM

## 2021-01-01 RX ORDER — LABETALOL HYDROCHLORIDE 5 MG/ML
5 INJECTION, SOLUTION INTRAVENOUS EVERY 6 HOURS PRN
Status: DISCONTINUED | OUTPATIENT
Start: 2021-01-01 | End: 2021-01-01 | Stop reason: HOSPADM

## 2021-01-01 RX ORDER — OXYCODONE AND ACETAMINOPHEN 7.5; 325 MG/1; MG/1
1 TABLET ORAL EVERY 8 HOURS PRN
Qty: 90 TABLET | Refills: 0 | Status: SHIPPED | OUTPATIENT
Start: 2021-01-01 | End: 2021-02-25

## 2021-01-01 RX ORDER — SCOLOPAMINE TRANSDERMAL SYSTEM 1 MG/1
1 PATCH, EXTENDED RELEASE TRANSDERMAL
Status: DISCONTINUED | OUTPATIENT
Start: 2021-01-01 | End: 2021-01-01 | Stop reason: HOSPADM

## 2021-01-01 RX ORDER — NICOTINE POLACRILEX 4 MG
15 LOZENGE BUCCAL PRN
Status: DISCONTINUED | OUTPATIENT
Start: 2021-01-01 | End: 2021-01-01 | Stop reason: HOSPADM

## 2021-01-01 RX ORDER — PROPOFOL 10 MG/ML
5-50 INJECTION, EMULSION INTRAVENOUS
Status: DISCONTINUED | OUTPATIENT
Start: 2021-01-01 | End: 2021-01-01 | Stop reason: HOSPADM

## 2021-01-01 RX ORDER — LEVETIRACETAM 10 MG/ML
1000 INJECTION INTRAVASCULAR ONCE
Status: COMPLETED | OUTPATIENT
Start: 2021-01-01 | End: 2021-01-01

## 2021-01-01 RX ADMIN — INSULIN LISPRO 1 UNITS: 100 INJECTION, SOLUTION INTRAVENOUS; SUBCUTANEOUS at 05:30

## 2021-01-01 RX ADMIN — PHYTONADIONE 10 MG: 10 INJECTION, EMULSION INTRAMUSCULAR; INTRAVENOUS; SUBCUTANEOUS at 15:31

## 2021-01-01 RX ADMIN — PROPOFOL 20 MCG/KG/MIN: 10 INJECTION, EMULSION INTRAVENOUS at 11:10

## 2021-01-01 RX ADMIN — MORPHINE SULFATE 4 MG: 4 INJECTION INTRAVENOUS at 15:41

## 2021-01-01 RX ADMIN — LABETALOL HYDROCHLORIDE 10 MG: 5 INJECTION, SOLUTION INTRAVENOUS at 15:22

## 2021-01-01 RX ADMIN — MORPHINE SULFATE 4 MG: 4 INJECTION INTRAVENOUS at 18:37

## 2021-01-01 RX ADMIN — SODIUM CHLORIDE 1000 MG: 900 INJECTION, SOLUTION INTRAVENOUS at 00:35

## 2021-01-01 RX ADMIN — ETOMIDATE 28.8 MG: 2 INJECTION INTRAVENOUS at 17:49

## 2021-01-01 RX ADMIN — PROPOFOL 20 MCG/KG/MIN: 10 INJECTION, EMULSION INTRAVENOUS at 00:50

## 2021-01-01 RX ADMIN — SODIUM CHLORIDE 1000 MG: 900 INJECTION, SOLUTION INTRAVENOUS at 13:17

## 2021-01-01 RX ADMIN — INSULIN LISPRO 1 UNITS: 100 INJECTION, SOLUTION INTRAVENOUS; SUBCUTANEOUS at 09:01

## 2021-01-01 RX ADMIN — PROPOFOL 40 MCG/KG/MIN: 10 INJECTION, EMULSION INTRAVENOUS at 20:38

## 2021-01-01 RX ADMIN — LABETALOL HYDROCHLORIDE 10 MG: 5 INJECTION, SOLUTION INTRAVENOUS at 16:07

## 2021-01-01 RX ADMIN — SUCCINYLCHOLINE CHLORIDE 100 MG: 20 INJECTION, SOLUTION INTRAMUSCULAR; INTRAVENOUS at 17:50

## 2021-01-01 RX ADMIN — PROPOFOL 5 MCG/KG/MIN: 10 INJECTION, EMULSION INTRAVENOUS at 17:57

## 2021-01-01 RX ADMIN — LEVETIRACETAM 1000 MG: 10 INJECTION INTRAVENOUS at 16:06

## 2021-01-01 ASSESSMENT — PULMONARY FUNCTION TESTS
PIF_VALUE: 20
PIF_VALUE: 19
PIF_VALUE: 20
PIF_VALUE: 34
PIF_VALUE: 20
PIF_VALUE: 20
PIF_VALUE: 19
PIF_VALUE: 19
PIF_VALUE: 20
PIF_VALUE: 20
PIF_VALUE: 21
PIF_VALUE: 20
PIF_VALUE: 21
PIF_VALUE: 19
PIF_VALUE: 14
PIF_VALUE: 20
PIF_VALUE: 11
PIF_VALUE: 20
PIF_VALUE: 19
PIF_VALUE: 12
PIF_VALUE: 20

## 2021-01-15 NOTE — PROGRESS NOTES
[address]      Cardiology Progress Note    Mikie Patel  1935    January 15, 2021    CC: \"I am still short of breath and have gained weight with swelling. \"     HPI:  The patient is 80 y.o. female with a past medical history significant for CAD, atrial fibrillation s/p Cardioversion in 2005 ([address]), hypertension, COPD, GERD and arthritis. Today, she is accompanied by her family. She reports that she continues with chronic unchanged ALSTON, rarely at rest as well as off/on BLE edema. She has also gained 11# since our last visit. She uses her inhaler regularly and help with her SOB. She reports medical therapy compliance and feels she is tolerating. Her Coumadin is managed per our [address] [facility] clinic. Patient denies exertional chest pain/pressure,  PND, orthopnea, palpitations, lightheadedness, weight changes,  and syncope. Past Medical History:   Diagnosis Date    A fib     Arthritis     COPD (chronic obstructive pulmonary disease) (HCC)     Coronary artery disease     Femur fracture ([address] 01/2016    left    Fibromyalgia     GERD (gastroesophageal reflux disease)     History of shingles     Hypertension     PONV (postoperative nausea and vomiting)     Rotator cuff disorder     left     Ulcer of gastroesophageal junction 2006     Past Surgical History:   Procedure Laterality Date    ACROMIOPLASTY Left 4/27/15    Dr Gómez Lab  age 21   [address] COLONOSCOPY  05.01.03    COLONOSCOPY  02/2017    no need for further colonoscopy per Dr. Piotr Potter 11/1/2018    [address] performed by Wan Cardenas MD at [facility] [address] Left 01/2016     Left intertrochanteric and shaft femur comminuted fracture, status post Gamma nail and cables.     [place] ESOPHAGOGASTRODUODENOSCOPY US SCOPE W/ADJ STRXRS N/A 11/1/2018 ESOPHAGEAL ENDOSCOPIC ULTRASOUND EXAM performed by Robbin Chan MD at [address]      right     UPPER GASTROINTESTINAL ENDOSCOPY N/A 2018    EGD BIOPSY performed by Robbin Chan MD at [address] History   Problem Relation Age of Onset    Stroke Mother     COPD Father     Coronary Art Dis Father     Other Brother         AAA rupture    Other Brother         cerebral hemorrhage    Heart Attack Son         cerebral hemorrhage as well    Cancer Sister         x2 sisters     Cancer Sister         ovarian      Social History     Tobacco Use    Smoking status: Former Smoker     Packs/day: 1.00     Years: 20.00     Pack years: 20.00     Types: Cigarettes     Quit date: 1980     Years since quittin.0    Smokeless tobacco: Never Used   Substance Use Topics    Alcohol use: No     Alcohol/week: 0.0 standard drinks    Drug use: No       Allergies   Allergen Reactions    Levaquin [Levofloxacin In D5w] Nausea And Vomiting    Digoxin Other (See Comments)     Pt states had to go er due to it affected her heart in a bad way-can't recall what type of reaction     Current Outpatient Medications   Medication Sig Dispense Refill    oxyCODONE-acetaminophen (PERCOCET) 7.5-325 MG per tablet Take 1 tablet by mouth every 8 hours as needed for Pain for up to 30 days. Intended supply: 30 days 90 tablet 0    gabapentin (NEURONTIN) 100 MG capsule Take 1 capsule by mouth nightly for 30 days.  30 capsule 0    DULoxetine (CYMBALTA) 30 MG extended release capsule Take 1 capsule by mouth daily 30 capsule 3    polyethylene glycol (MIRALAX) 17 g packet Take 17 g by mouth daily as needed for Constipation 527 g 1    Respiratory Therapy Supplies (NEBULIZER) DARINEL Used to give yourself breathing treatment 1 Device 0  ipratropium-albuterol (DUONEB) 0.5-2.5 (3) MG/3ML SOLN nebulizer solution Inhale 3 mLs into the lungs every 4 hours as needed for Shortness of Breath (wheezing coughing) 360 mL 5    ondansetron (ZOFRAN ODT) 4 MG disintegrating tablet Take 1 tablet by mouth 4 times daily as needed for Nausea or Vomiting 60 tablet 0    carvedilol (COREG) 6.25 MG tablet Take 1.5 tablets by mouth 2 times daily 180 tablet 3    amLODIPine (NORVASC) 5 MG tablet TAKE ONE TABLET BY MOUTH DAILY 90 tablet 3    warfarin (COUMADIN) 5 MG tablet TAKE 1 AND 1/2 TABLET BY MOUTH ON MONDAY, WEDNESDAY, AND FRIDAY AND TAKE ONE TABLET BY MOUTH DAILY ON ALL OTHER DAYS 120 tablet 5    diclofenac sodium (VOLTAREN) 1 % GEL Apply 2 g topically 4 times daily 1 Tube 5    lisinopril (PRINIVIL;ZESTRIL) 20 MG tablet Take 1 tablet by mouth daily 180 tablet 1    albuterol sulfate HFA (PROAIR HFA) 108 (90 Base) MCG/ACT inhaler Inhale 2 puffs into the lungs every 6 hours as needed for Wheezing 1 Inhaler 5    naloxone 4 MG/0.1ML LIQD nasal spray 1 spray by Nasal route as needed for Opioid Reversal 1 each 5     No current facility-administered medications for this visit. Review of Systems:  · Constitutional: no unanticipated weight loss. There's been no change in energy level, sleep pattern, or activity level. No fevers, chills. · Eyes: No visual changes or diplopia. No scleral icterus. · ENT: No Headaches, hearing loss or vertigo. No mouth sores or sore throat. · Cardiovascular: + weight gain, BLE edema and ALSTON with occasional SOB at rest as reviewed in HPI  · Respiratory: No cough or wheezing, no sputum production. No hematemesis. · Gastrointestinal: No abdominal pain, appetite loss, blood in stools. No change in bowel or bladder habits. · Genitourinary: No dysuria, trouble voiding, or hematuria. · Musculoskeletal:  No gait disturbance, no joint complaints. · Integumentary: No rash or pruritis. · Neurological: No headache, diplopia, change in muscle strength, numbness or tingling. · Psychiatric: No anxiety or depression. · Endocrine: No temperature intolerance. No excessive thirst, fluid intake, or urination. No tremor. · Hematologic/Lymphatic: No abnormal bruising or bleeding, blood clots or swollen lymph nodes. · Allergic/Immunologic: No nasal congestion or hives. Physical Exam:   Vitals:    01/15/21 1108   BP: (!) 154/80   Pulse: 84   Temp:    SpO2:        Temp 98.6 °F (37 °C)   Ht 5' 3\" (1.6 m)   Wt 209 lb (94.8 kg) Comment: with shoes  LMP  (LMP Unknown)   BMI 37.02 kg/m²   Wt Readings from Last 3 Encounters:   01/15/21 209 lb (94.8 kg)   07/14/20 198 lb (89.8 kg)   05/29/20 197 lb (89.4 kg)     Constitutional: The patient is oriented to person, place, and time. Appears well-developed and well-nourished. In no acute distress. Head: Normocephalic and atraumatic. Pupils equal and round. Neck: Neck supple. Elevated JVP No carotid bruit appreciated. No mass and no thyromegaly present. No lymphadenopathy present. Cardiovascular: Irregularly irregular Normal heart sounds. Exam reveals no gallop and no friction rub. Systolic murmur heard at the base. Pulmonary/Chest: Effort normal and breath sounds normal. No respiratory distress. Scattered crackles throughout bilaterally. Abdominal: Soft, non-tender. Bowel sounds are normal. Exhibits no organomegaly, mass or bruit. Extremities: 2+ BLE edema. No cyanosis or clubbing. Pulses are 2+ radial and carotid bilaterally. Neurological: No gross cranial nerve deficit. Coordination normal.   Skin: Skin is warm and dry. There is no rash or diaphoresis. Psychiatric: Patient has a normal mood and affect.  Speech is normal and behavior is normal.     Lab Review:    Lab Results   Component Value Date    TRIG 129 06/27/2019    HDL 54 06/27/2019    HDL 47 03/22/2012    LDLCALC 73 06/27/2019    LABVLDL 26 06/27/2019     Lab Results Component Value Date     12/22/2020      Lab Results   Component Value Date    HGB 14.2 12/22/2020    HCT 42.8 12/22/2020     Lab Results   Component Value Date     12/22/2020     Lab Results   Component Value Date    K 4.1 12/22/2020     Lab Results   Component Value Date    BUN 13 12/22/2020    CREATININE 0.5 12/22/2020     EKG Interpretation: 3/13/18 Afib-CVR     8/24/18 Afib- controlled rate     5/29/2020 Atrial fibrillation     7/14/20 control atrial fibrillation, ~77 bpm      1/15/21 A fib controllrd  Image Review:  Stress test 6/12/18  Pharmacological Stress/MPI Results:        1. Technically a satisfactory study.    2. Normal pharmacological stress portion of the study.    3. No evidence of Ischemia by Myocardial Perfusion Imaging.    4. Gated Study not performed because of Atrial fibrillation. ECHO 11/2017  Left ventricle size is normal. Normal left ventricular wall thickness. Ejection fraction is visually estimated to be 60-65%. No regional wall  motion abnormalities are noted. Patient appears to be in atrial  fibrillation. Normal right ventricular size and function. fixed echogenic structure noted on the atrial side of the mitral annulus  that could represent a focal calcification. No evidence of mitral valve stenosis. Mild to moderate mitral regurgitation is present. There is mild tricuspid regurgitation with PASP estimated at 33 mmHg.     Echo 5/2015  Pt supine and arm in sling from recent shoulder surgery. Normal left ventricle size, wall thickness and an estimated ejection  fraction of 55%. No regional wall motion abnormalities are seen. The right ventricle is normal in size and function. Echo: 6/15/20  Summary   Left ventricular cavity size is normal.   There is mild concentric left ventricular hypertrophy. Ejection fraction is visually estimated to be 55-60%. Indeterminate diastolic function. Mitral annular calcification is present. Thank you very much for allowing me to participate in the care of your patient. Please do not hesitate to contact me if you have any questions. Sincerely,  Shivam Moore MD       [facility] [address] [place] [address]  Ph: [phone]  Fax: [phone]    This note was scribed in the presence of Dr. Mariel Mac MD by Clovis Vaughn RN. Physician Attestation:  The scribes documentation has been prepared under my direction and personally reviewed by me in its entirety. I confirm that the note above accurately reflects all work, treatment, procedures, and medical decision making performed by me.

## 2021-01-15 NOTE — PATIENT INSTRUCTIONS
Patient Education        Low Sodium Diet (2,000 Milligram): Care Instructions  Your Care Instructions     Too much sodium causes your body to hold on to extra water. This can raise your blood pressure and force your heart and kidneys to work harder. In very serious cases, this could cause you to be put in the hospital. It might even be life-threatening. By limiting sodium, you will feel better and lower your risk of serious problems. The most common source of sodium is salt. People get most of the salt in their diet from canned, prepared, and packaged foods. Fast food and restaurant meals also are very high in sodium. Your doctor will probably limit your sodium to less than 2,000 milligrams (mg) a day. This limit counts all the sodium in prepared and packaged foods and any salt you add to your food. Follow-up care is a key part of your treatment and safety. Be sure to make and go to all appointments, and call your doctor if you are having problems. It's also a good idea to know your test results and keep a list of the medicines you take. How can you care for yourself at home? Read food labels  · Read labels on cans and food packages. The labels tell you how much sodium is in each serving. Make sure that you look at the serving size. If you eat more than the serving size, you have eaten more sodium. · Food labels also tell you the Percent Daily Value for sodium. Choose products with low Percent Daily Values for sodium. · Be aware that sodium can come in forms other than salt, including monosodium glutamate (MSG), sodium citrate, and sodium bicarbonate (baking soda). MSG is often added to Asian food. When you eat out, you can sometimes ask for food without MSG or added salt.   Buy low-sodium foods · Buy foods that are labeled \"unsalted\" (no salt added), \"sodium-free\" (less than 5 mg of sodium per serving), or \"low-sodium\" (less than 140 mg of sodium per serving). Foods labeled \"reduced-sodium\" and \"light sodium\" may still have too much sodium. Be sure to read the label to see how much sodium you are getting. · Buy fresh vegetables, or frozen vegetables without added sauces. Buy low-sodium versions of canned vegetables, soups, and other canned goods. Prepare low-sodium meals  · Cut back on the amount of salt you use in cooking. This will help you adjust to the taste. Do not add salt after cooking. One teaspoon of salt has about 2,300 mg of sodium. · Take the salt shaker off the table. · Flavor your food with garlic, lemon juice, onion, vinegar, herbs, and spices. Do not use soy sauce, lite soy sauce, steak sauce, onion salt, garlic salt, celery salt, mustard, or ketchup on your food. · Use low-sodium salad dressings, sauces, and ketchup. Or make your own salad dressings and sauces without adding salt. · Use less salt (or none) when recipes call for it. You can often use half the salt a recipe calls for without losing flavor. Other foods such as rice, pasta, and grains do not need added salt. · Rinse canned vegetables, and cook them in fresh water. This removes somebut not allof the salt. · Avoid water that is naturally high in sodium or that has been treated with water softeners, which add sodium. Call your local water company to find out the sodium content of your water supply. If you buy bottled water, read the label and choose a sodium-free brand. Avoid high-sodium foods  · Avoid eating:  ? Smoked, cured, salted, and canned meat, fish, and poultry. ? Ham, forman, hot dogs, and luncheon meats. ? Regular, hard, and processed cheese and regular peanut butter. ? Crackers with salted tops, and other salted snack foods such as pretzels, chips, and salted popcorn. How can you care for yourself at home? · If your doctor gave you medicine, take it as prescribed. Call your doctor if you think you are having a problem with your medicine. · Whenever you are resting, raise your legs up. Try to keep the swollen area higher than the level of your heart. · Take breaks from standing or sitting in one position. ? Walk around to increase the blood flow in your lower legs. ? Move your feet and ankles often while you stand, or tighten and relax your leg muscles. · Wear support stockings. Put them on in the morning, before swelling gets worse. · Eat a balanced diet. Lose weight if you need to. · Limit the amount of salt (sodium) in your diet. Salt holds fluid in the body and may increase swelling. When should you call for help? Call 911 anytime you think you may need emergency care. For example, call if:    · You have symptoms of a blood clot in your lung (called a pulmonary embolism). These may include:  ? Sudden chest pain. ? Trouble breathing. ? Coughing up blood. Call your doctor now or seek immediate medical care if:    · You have signs of a blood clot, such as:  ? Pain in your calf, back of the knee, thigh, or groin. ? Redness and swelling in your leg or groin.     · You have symptoms of infection, such as:  ? Increased pain, swelling, warmth, or redness. ? Red streaks or pus. ? A fever. Watch closely for changes in your health, and be sure to contact your doctor if:    · Your swelling is getting worse.     · You have new or worsening pain in your legs.     · You do not get better as expected. Where can you learn more? Go to https://VANCLcassieStoryworks OnDemand.AlertMe. org and sign in to your OtherInbox account. Enter S014 in the [employer] box to learn more about \"Leg and Ankle Edema: Care Instructions. \"     If you do not have an account, please click on the \"Sign Up Now\" link.   Current as of: June 26, 2019               Content Version: 12.6 © 9008-2732 [employer]wise, Incorporated. Care instructions adapted under license by [facility] ([facility]). If you have questions about a medical condition or this instruction, always ask your healthcare professional. [address] any warranty or liability for your use of this information. Patient Education        furosemide (oral/injection)  Pronunciation:  fur ALBERTO michael  Brand:  Lasix  What is the most important information I should know about furosemide? You should not use this medicine if you are unable to urinate. Do not take more than your recommended dose. High doses of furosemide may cause irreversible hearing loss. What is furosemide? Furosemide is a loop diuretic (water pill) that prevents your body from absorbing too much salt. This allows the salt to instead be passed in your urine. Furosemide is used to treat fluid retention (edema) in people with congestive heart failure, liver disease, or a kidney disorder such as nephrotic syndrome. Furosemide is also used to treat high blood pressure (hypertension). Furosemide may also be used for purposes not listed in this medication guide. What should I discuss with my healthcare provider before taking furosemide? You should not use furosemide if you are allergic to it, or if you are unable to urinate. Tell your doctor if you have ever had:  · kidney disease;  · enlarged prostate, bladder obstruction, urination problems;  · cirrhosis or other liver disease;  · an electrolyte imbalance (such as low levels of potassium or magnesium in your blood);  · gout;  · lupus;  · diabetes; or  · a sulfa drug allergy. Tell your doctor if you have an MRI (magnetic resonance imaging) or any type of scan using a radioactive dye that is injected into your veins. Both contrast dyes and furosemide can harm your kidneys. It is not known whether this medicine will harm an unborn baby. Tell your doctor if you are pregnant or plan to become pregnant. It may not be safe to breastfeed while using this medicine. Ask your doctor about any risk. Furosemide may slow breast milk production. How should I take furosemide? Follow all directions on your prescription label and read all medication guides or instruction sheets. Your doctor may occasionally change your dose. Use the medicine exactly as directed. Furosemide oral is taken by mouth. Furosemide injection is injected into a muscle or given as an infusion into a vein. A healthcare provider will give you this injection if you are unable to take the medicine by mouth. You may receive your first dose in a hospital or clinic setting if you have severe liver disease. Do not take more than your recommended dose. High doses of furosemide may cause irreversible hearing loss. Measure liquid medicine carefully. Use the dosing syringe provided, or use a medicine dose-measuring device (not a kitchen spoon). Furosemide doses are based on weight in children. Your child's dose needs may change if the child gains or loses weight. Furosemide will make you urinate more often and you may get dehydrated easily. Follow your doctor's instructions about using potassium supplements or getting enough salt and potassium in your diet. Your blood pressure will need to be checked often and you may need other medical tests. If you have high blood pressure, keep using this medicine even if you feel well. High blood pressure often has no symptoms. You may need to use blood pressure medicine for the rest of your life. If you need surgery, tell the surgeon ahead of time that you are using furosemide. Store at room temperature away from moisture, heat, and light. Throw away any unused oral liquid  after 90 days. What happens if I miss a dose? Furosemide is sometimes used only once, so you may not be on a dosing schedule. If you are using the medication regularly, take the medicine as soon as you can, but skip the missed dose if it is almost time for your next dose. Do not take two doses at one time. What happens if I overdose? Seek emergency medical attention or call the Poison Help line at 1-912.511.7753. Overdose symptoms may include feeling very thirsty or hot, heavy sweating, hot and dry skin, extreme weakness, or fainting. What should I avoid while taking furosemide? Avoid getting up too fast from a sitting or lying position, or you may feel dizzy. Avoid becoming dehydrated. Follow your doctor's instructions about the type and amount of liquids you should drink while you are taking furosemide. Drinking alcohol with this medicine can cause side effects. If you have high blood pressure, ask a doctor or pharmacist before taking any medicines that can raise your blood pressure, such as diet pills or cough-and-cold medicine. What are the possible side effects of furosemide? Get emergency medical help if you have signs of an allergic reaction (hives, difficult breathing, swelling in your face or throat) or a severe skin reaction (fever, sore throat, burning in your eyes, skin pain, red or purple skin rash that spreads and causes blistering and peeling).   Call your doctor at once if you have:  · a light-headed feeling, like you might pass out;  · ringing in your ears, hearing loss;  · muscle spasms or contractions;  · pale skin, easy bruising, unusual bleeding;  · high blood sugar --increased thirst, increased urination, dry mouth, fruity breath odor;  · kidney problems --little or no urination, swelling in your feet or ankles, feeling tired or short of breath;  · signs of liver or pancreas problems --loss of appetite, upper stomach pain (that may spread to your back), nausea or vomiting, dark urine, jaundice (yellowing of the skin or eyes); or · signs of an electrolyte imbalance --dry mouth, thirst, weakness, drowsiness, feeling jittery or unsteady, vomiting, irregular heartbeats, fluttering in your chest, numbness or tingling, muscle cramps, muscle weakness or limp feeling. Common side effects may include:  · diarrhea, constipation, loss of appetite;  · numbness or tingling;  · headache, dizziness; or  · blurred vision. This is not a complete list of side effects and others may occur. Call your doctor for medical advice about side effects. You may report side effects to FDA at 6-794-YFO-0551. What other drugs will affect furosemide? Sometimes it is not safe to use certain medications at the same time. Some drugs can affect your blood levels of other drugs you take, which may increase side effects or make the medications less effective. If you also take sucralfate, take your furosemide dose 2 hours before or 2 hours after you take sucralfate. Tell your doctor about all your other medicines, especially:  · another diuretic, especially ethacrynic acid;  · chloral hydrate;  · lithium;  · phenytoin;  · an injected antibiotic;  · cancer medicine, such as cisplatin;  · heart or blood pressure medicine; or  · salicylates such as aspirin, Nuprin Backache Caplet, Kaopectate, KneeRelief, Pamprin Cramp Formula, Pepto-Bismol, Tricosal, Trilisate, and others. This list is not complete. Other drugs may affect furosemide, including prescription and over-the-counter medicines, vitamins, and herbal products. Not all possible drug interactions are listed here. Where can I get more information? Your pharmacist can provide more information about furosemide. Remember, keep this and all other medicines out of the reach of children, never share your medicines with others, and use this medication only for the indication prescribed. Every effort has been made to ensure that the information provided by [address] is accurate, up-to-date, and complete, but no guarantee is made to that effect. Drug information contained herein may be time sensitive. [facility] information has been compiled for use by healthcare practitioners and consumers in the [place] and therefore [facility] does not warrant that uses outside of the [place] are appropriate, unless specifically indicated otherwise. [facility]'s drug information does not endorse drugs, diagnose patients or recommend therapy. [facility]'s drug information is an informational resource designed to assist licensed healthcare practitioners in caring for their patients and/or to serve consumers viewing this service as a supplement to, and not a substitute for, the expertise, skill, knowledge and judgment of healthcare practitioners. The absence of a warning for a given drug or drug combination in no way should be construed to indicate that the drug or drug combination is safe, effective or appropriate for any given patient. [facility] does not assume any responsibility for any aspect of healthcare administered with the aid of information [facility] provides. The information contained herein is not intended to cover all possible uses, directions, precautions, warnings, drug interactions, allergic reactions, or adverse effects. If you have questions about the drugs you are taking, check with your doctor, nurse or pharmacist.  Copyright 5335-7700 [address] Version: 16.01. Revision date: 5/22/2019. Care instructions adapted under license by [facility] ([facility]). If you have questions about a medical condition or this instruction, always ask your healthcare professional. [place] [address] any warranty or liability for your use of this information.

## 2021-01-22 NOTE — TELEPHONE ENCOUNTER
Attempted to reach Ms. Deluna Loop - no answer. Not able to leave message. Will try again later. Sommer Araujo [facility], [facility]  [facility]  [address] [place], [place] [address]  [phone]

## 2021-01-26 NOTE — PATIENT INSTRUCTIONS
Patient Education        Back Stretches: Exercises  Introduction  Here are some examples of exercises for stretching your back. Start each exercise slowly. Ease off the exercise if you start to have pain. Your doctor or physical therapist will tell you when you can start these exercises and which ones will work best for you. How to do the exercises  Overhead stretch   1. Stand comfortably with your feet shoulder-width apart. 2. Looking straight ahead, raise both arms over your head and reach toward the ceiling. Do not allow your head to tilt back. 3. Hold for 15 to 30 seconds, then lower your arms to your sides. 4. Repeat 2 to 4 times. Side stretch   1. Stand comfortably with your feet shoulder-width apart. 2. Raise one arm over your head, and then lean to the other side. 3. Slide your hand down your leg as you let the weight of your arm gently stretch your side muscles. Hold for 15 to 30 seconds. 4. Repeat 2 to 4 times on each side. Press-up   1. Lie on your stomach, supporting your body with your forearms. 2. Press your elbows down into the floor to raise your upper back. As you do this, relax your stomach muscles and allow your back to arch without using your back muscles. As your press up, do not let your hips or pelvis come off the floor. 3. Hold for 15 to 30 seconds, then relax. 4. Repeat 2 to 4 times. Relax and rest   1. Lie on your back with a rolled towel under your neck and a pillow under your knees. Extend your arms comfortably to your sides. 2. Relax and breathe normally. 3. Remain in this position for about 10 minutes. 4. If you can, do this 2 or 3 times each day. Follow-up care is a key part of your treatment and safety. Be sure to make and go to all appointments, and call your doctor if you are having problems. It's also a good idea to know your test results and keep a list of the medicines you take. Where can you learn more? Go to https://chpepiceweb.healthSimpleRelevance. org and sign in to your Favery[employer] account. Enter V280 in the [employer] box to learn more about \"Back Stretches: Exercises. \"     If you do not have an account, please click on the \"Sign Up Now\" link. Current as of: March 2, 2020               Content Version: 12.6  © 6989-5187 [employer]. Care instructions adapted under license by [facility] [place] ([facility]). If you have questions about a medical condition or this instruction, always ask your healthcare professional. [address] any warranty or liability for your use of this information.

## 2021-01-26 NOTE — PROGRESS NOTES
TELE HEALTH VISIT (AUDIO-VISUAL)    Pursuant to the emergency declaration under the [facility], Replaced by [facility] waiver authority and the [employer] Act, this Virtual  Visit was conducted, with patient's consent, to reduce the patient's risk of exposure to COVID-19 and provide continuity of care for an established patient. Service is  provided through a video synchronous discussion virtually to substitute for in-person clinic visit. Due to this being a TeleHealth encounter ([facility]), evaluation of the following organ systems was limited: Vitals/Constitutional/EENT/Resp/CV/GI//MS/Neuro/Skin/Cdda-Clcd-Dfe. [employer]  1935  [id number]    Ms. Royer Campa is being seen virtually for a follow up visit using Doxy. me/[employer] Video visit/[employer] or face time  Informed verbal consent to the virtual visit was obtained from Ms. Royer Campa. Risks associated with HIPPA compliance with the virtual visit was explained to the patient. Ms. Royer Campa is at her home and Andres CISNEROS is in her office. HISTORY OF PRESENT ILLNESS:  Ms. Royer Campa is a 80 y.o. female  being assessed for a follow up visit for pain management for evaluation of ongoing care regarding her symptoms and monitoring of compliance with long term use high risk medications. She has a diagnosis of   1. Chronic pain syndrome    2. Fibromyalgia    3. Other spondylosis with radiculopathy, lumbar region    4. Other idiopathic scoliosis, thoracolumbar region    5. Lumbar radiculopathy    6. Other spondylosis, cervical region    7. Polyarthropathy, multiple sites    8. Primary osteoarthritis of both hips    9.  Neuropathy      On the Patients Pain Assessment form reviewed with the Medical Assistant: She complains of pain in the bilateral lower back  with radiation to the lower leg Bilateral  And also c/o pain in both shoulders which radiates down both arms. She rates the pain 9/10 and describes it as aching. Current treatment regimen has helped relieve about 90% of the pain. She denies any side effects from the current pain regimen. Patient reports that since the last follow up visit the physical functioning is unchanged, family/social relationships are unchanged, mood is unchanged sleep patterns are unchanged, and that the overall functioning is unchanged. Patient denies misusing/abusing her narcotic pain medications or using any illegal drugs. Upon obtaining medical history from Ms. Hardy Khan states that pain is manageable on current pain therapy. Takes an extra 1/2 a pill on days where pain is bothersome. Over all stable on current pain therapy. Mood is stable without anxiety. Sleep is fair with an average of 5-6 hours. Denies to having issues of constipation. Tolerating activities/house chores with moderate tenderness to the lower back. ALLERGIES: Patients list of allergies were reviewed     MEDICATIONS: Ms. Hardy Khan list of medications were reviewed. Her current medications are   Outpatient Medications Prior to Visit   Medication Sig Dispense Refill    furosemide (LASIX) 40 MG tablet Take 1 tablet by mouth daily 30 tablet 6    potassium chloride (KLOR-CON M) 20 MEQ extended release tablet Take 1 tablet by mouth daily 30 tablet 6    DULoxetine (CYMBALTA) 30 MG extended release capsule Take 1 capsule by mouth daily 30 capsule 3    polyethylene glycol (MIRALAX) 17 g packet Take 17 g by mouth daily as needed for Constipation 527 g 1    Respiratory Therapy Supplies (NEBULIZER) DARINEL Used to give yourself breathing treatment 1 Device 0  ipratropium-albuterol (DUONEB) 0.5-2.5 (3) MG/3ML SOLN nebulizer solution Inhale 3 mLs into the lungs every 4 hours as needed for Shortness of Breath (wheezing coughing) 360 mL 5    ondansetron (ZOFRAN ODT) 4 MG disintegrating tablet Take 1 tablet by mouth 4 times daily as needed for Nausea or Vomiting 60 tablet 0    carvedilol (COREG) 6.25 MG tablet Take 1.5 tablets by mouth 2 times daily 180 tablet 3    amLODIPine (NORVASC) 5 MG tablet TAKE ONE TABLET BY MOUTH DAILY 90 tablet 3    warfarin (COUMADIN) 5 MG tablet TAKE 1 AND 1/2 TABLET BY MOUTH ON MONDAY, WEDNESDAY, AND FRIDAY AND TAKE ONE TABLET BY MOUTH DAILY ON ALL OTHER DAYS 120 tablet 5    diclofenac sodium (VOLTAREN) 1 % GEL Apply 2 g topically 4 times daily 1 Tube 5    lisinopril (PRINIVIL;ZESTRIL) 20 MG tablet Take 1 tablet by mouth daily 180 tablet 1    albuterol sulfate HFA (PROAIR HFA) 108 (90 Base) MCG/ACT inhaler Inhale 2 puffs into the lungs every 6 hours as needed for Wheezing 1 Inhaler 5    naloxone 4 MG/0.1ML LIQD nasal spray 1 spray by Nasal route as needed for Opioid Reversal 1 each 5    oxyCODONE-acetaminophen (PERCOCET) 7.5-325 MG per tablet Take 1 tablet by mouth every 8 hours as needed for Pain for up to 30 days. Intended supply: 30 days 90 tablet 0    gabapentin (NEURONTIN) 100 MG capsule Take 1 capsule by mouth nightly for 30 days. 30 capsule 0     No facility-administered medications prior to visit. SOCIAL/FAMILY/PAST MEDICAL HISTORY: Ms. Gia Calvo, family and past medical history was reviewed. REVIEW OF SYSTEMS:    Respiratory: Negative for apnea, chest tightness and shortness of breath or change in baseline breathing. Gastrointestinal: Negative for nausea, vomiting, abdominal pain, diarrhea, constipation, blood in stool and abdominal distention. PHYSICAL EXAM:   Nursing note and vitals reviewed.  LMP  (LMP Unknown)  as per patient Constitutional: She appears well-developed and well-nourished. No acute distress. Skin: Skin appears to be warm and dry. No rashes or any other marks noted. She is not diaphoretic. Neurological/Psychiatric:She is alert and oriented to person, place, and time. Coordination is  normal.  Her mood isAppropriate and affect is Neutral/Euthymic(normal). Her behavior is normal.   thought content normal.   Musculoskeletal / Extremities: Not assessed    IMPRESSION:   1. Chronic pain syndrome    2. Fibromyalgia    3. Other spondylosis with radiculopathy, lumbar region    4. Other idiopathic scoliosis, thoracolumbar region    5. Lumbar radiculopathy    6. Other spondylosis, cervical region    7. Polyarthropathy, multiple sites    8. Primary osteoarthritis of both hips    9. Neuropathy        PLAN:  Informed verbal consent regarding treatment was obtained  -Continue with Percocet, Neurontin, Cymbalta, Narcan, Miralax  -Home exercises recommended, walking at least 10-15 mintutes daily  -Recommended patient take pain medications as prescribed, take Tylenol for breakthrough pain  -CBT techniques- relaxation therapies such as biofeedback, mindfulness based stress reduction, imagery, cognitive restructuring, problem solving discussed with patient  -Last UDS 9/1/20 Consistent  -Return in about 4 weeks (around 2/23/2021). Current Outpatient Medications   Medication Sig Dispense Refill    oxyCODONE-acetaminophen (PERCOCET) 7.5-325 MG per tablet Take 1 tablet by mouth every 8 hours as needed for Pain for up to 30 days. Intended supply: 30 days 90 tablet 0    gabapentin (NEURONTIN) 100 MG capsule Take 1 capsule by mouth nightly for 30 days.  30 capsule 0    furosemide (LASIX) 40 MG tablet Take 1 tablet by mouth daily 30 tablet 6    potassium chloride (KLOR-CON M) 20 MEQ extended release tablet Take 1 tablet by mouth daily 30 tablet 6  DULoxetine (CYMBALTA) 30 MG extended release capsule Take 1 capsule by mouth daily 30 capsule 3    Respiratory Therapy Supplies (NEBULIZER) DARINEL Used to give yourself breathing treatment 1 Device 0    ipratropium-albuterol (DUONEB) 0.5-2.5 (3) MG/3ML SOLN nebulizer solution Inhale 3 mLs into the lungs every 4 hours as needed for Shortness of Breath (wheezing coughing) 360 mL 5    ondansetron (ZOFRAN ODT) 4 MG disintegrating tablet Take 1 tablet by mouth 4 times daily as needed for Nausea or Vomiting 60 tablet 0    carvedilol (COREG) 6.25 MG tablet Take 1.5 tablets by mouth 2 times daily 180 tablet 3    amLODIPine (NORVASC) 5 MG tablet TAKE ONE TABLET BY MOUTH DAILY 90 tablet 3    warfarin (COUMADIN) 5 MG tablet TAKE 1 AND 1/2 TABLET BY MOUTH ON MONDAY, WEDNESDAY, AND FRIDAY AND TAKE ONE TABLET BY MOUTH DAILY ON ALL OTHER DAYS 120 tablet 5    diclofenac sodium (VOLTAREN) 1 % GEL Apply 2 g topically 4 times daily 1 Tube 5    lisinopril (PRINIVIL;ZESTRIL) 20 MG tablet Take 1 tablet by mouth daily 180 tablet 1    albuterol sulfate HFA (PROAIR HFA) 108 (90 Base) MCG/ACT inhaler Inhale 2 puffs into the lungs every 6 hours as needed for Wheezing 1 Inhaler 5    naloxone 4 MG/0.1ML LIQD nasal spray 1 spray by Nasal route as needed for Opioid Reversal 1 each 5     No current facility-administered medications for this visit. I will continue her current medication regimen  which is part of the above treatment schedule. It has been helping with Ms. Denis's chronic  medical problems which for this visit include:   Diagnoses of Chronic pain syndrome, Fibromyalgia, Other spondylosis with radiculopathy, lumbar region, Other idiopathic scoliosis, thoracolumbar region, Lumbar radiculopathy, Other spondylosis, cervical region, Polyarthropathy, multiple sites, Primary osteoarthritis of both hips, and Neuropathy were pertinent to this visit. Risks and benefits of the medications and other alternative treatments  including no treatment were discussed with the patient. The common side effects of these medications were also explained to the patient. Informed verbal consent was obtained. Goals of current treatment regimen include improvement in pain, restoration of functioning- with focus on improvement in physical performance, general activity, work or disability,emotional distress, health care utilization and  decreased medication consumption. Will continue to monitor progress towards achieving/maintaining therapeutic goals with special emphasis on  1. Improvement in perceived interfernce  of pain with ADL's. Ability to do home exercises independently. Ability to do household chores indoor and/or outdoor work and social and leisure activities. Improve psychosocial and physical functioning. - she is showing progression towards this treatment goal with the current regimen. She was advised against drinking alcohol with the narcotic pain medicines, advised against driving or handling machinery while adjusting the dose of medicines or if having cognitive  issues related to the current medications. Risk of overdose and death, if medicines not taken as prescribed, were also discussed. If the patient develops new symptoms or if the symptoms worsen, the patient should call the office. While transcribing every attempt was made to maintain the accuracy of the note in terms of it's contents,there may have been some errors made inadvertently. Thank you for allowing me to participate in the care of this patient. Betty Harrington.  Gayle Banda APRN-JESSI     Cc: Fariha Rosas MD

## 2021-02-02 NOTE — TELEPHONE ENCOUNTER
Attempted to reach Ms. Ashley Santizo - no answer. Left message to please return my call. RE: INR appointment needed. Ev Hamilton [facility], [address] [facility]  Anticoagulation Clinic  [address]   [place] [address]  [phone]

## 2021-02-19 PROBLEM — I61.9 BRAIN BLEED (HCC): Status: ACTIVE | Noted: 2021-01-01

## 2021-02-19 NOTE — PLAN OF CARE
NEUROSURGERY PROGRESS NOTE    Shira Calixto  [id number]   1935 2/19/2021    ED physician: Dr. Liseth Calix MD    Reason for call: ICH w/IVH    History of present illness: Patient is a 80 y.o. female that  has a past medical history of A fib, Arthritis, COPD (chronic obstructive pulmonary disease) ([facility] [address] Coronary artery disease, Femur fracture ([facility] [address] (01/2016), Fibromyalgia, GERD (gastroesophageal reflux disease), History of shingles, Hypertension, PONV (postoperative nausea and vomiting), Rotator cuff disorder, and Ulcer of gastroesophageal junction (2006). Patient presented on 2/19/2021 to [facility] ED w/AMS. According to Dr. Yung Crews, the patient was found to be altered by family. Last known normal is either possible yesterday versus gradual.  No family members present at bedside to provide further history. Patient is not responding verbally. Physical Exam:     BP (!) 159/82   Pulse 89   Temp 97.7 °F (36.5 °C) (Oral)   Resp 15   Wt 212 lb 1.3 oz (96.2 kg)   LMP  (LMP Unknown)   SpO2 97%   BMI 37.57 kg/m²   GCS per ED physician:  1 - Does not open eyes  2 - Moans, makes unintelligible sounds  1 - No motor response to pain    Radiological Findings:  Ct Head Wo Contrast  Result Date: 2/19/2021  1. There is a large focus of acute intraparenchymal hemorrhage within the right frontal lobe, measuring approximately 6.8 cm in AP diameter, and 5.1 cm in transverse diameter. This exerts mass effect on the adjacent cerebral sulci as well as approximately 9 mm of right-to-left midline shift. 2. Acute hemorrhage extends into the ventricular system, with acute intraventricular hemorrhage identified within the right frontal horn, bilateral lateral ventricles, 3rd ventricle, within the trigone of the right lateral ventricle, 4th ventricle, as well as layering within the bilateral occipital horns.   3. There is a large focus of cerebral edema within the right frontal lobe at the periphery of the large focus of

## 2021-02-19 NOTE — ED NOTES
Unable to start Cardene drip due to not having a IV to use. Only IV has Propofol running in it. MD aware and pharmacy placing new orders.       Veronica Robins RN  02/19/21 [id number]

## 2021-02-19 NOTE — ED PROVIDER NOTES
[facility]      CHIEF COMPLAINT  Altered Mental Status (Last known well yesterday)       HISTORY OF PRESENT ILLNESS  Jaime Mcghee is a 80 y.o. female with history of atrial fibrillation, COPD, CAD who presents to the emergency department for altered mental status. Per EMS, patient was found to be altered by family. Last known normal is either possible yesterday versus gradual.  No family members present at bedside to provide further history. Patient is not responding verbally. Patient moans on stimulation. There is yeast infection under bilateral breast.     No other complaints, modifying factors or associated symptoms. I have reviewed the following from the nursing documentation. Past Medical History:   Diagnosis Date    A fib     Arthritis     COPD (chronic obstructive pulmonary disease) (HCC)     Coronary artery disease     Femur fracture ([address] 01/2016    left    Fibromyalgia     GERD (gastroesophageal reflux disease)     History of shingles     Hypertension     PONV (postoperative nausea and vomiting)     Rotator cuff disorder     left     Ulcer of gastroesophageal junction 2006     Past Surgical History:   Procedure Laterality Date    ACROMIOPLASTY Left 4/27/15    Dr David Oliva  age 21   [address] [facility] COLONOSCOPY  05.01.03    COLONOSCOPY  02/2017    no need for further colonoscopy per Dr. Brad Villalobos 11/1/2018    Bruce Portervius performed by Luisito Hannon MD at [address] Left 01/2016     Left intertrochanteric and shaft femur comminuted fracture, status post Gamma nail and cables.     NE ESOPHAGOGASTRODUODENOSCOPY US SCOPE W/ADJ STRXRS N/A 11/1/2018    ESOPHAGEAL ENDOSCOPIC ULTRASOUND EXAM performed by Luisito Hannon MD at [address]       right     UPPER GASTROINTESTINAL ENDOSCOPY N/A 11/1/2018 EGD BIOPSY performed by Stepan Gonzalez MD at [address] History   Problem Relation Age of Onset    Stroke Mother     COPD Father     Coronary Art Dis Father     Other Brother         AAA rupture    Other Brother         cerebral hemorrhage    Heart Attack Son         cerebral hemorrhage as well    Cancer Sister         x2 sisters     Cancer Sister         ovarian      Social History     Socioeconomic History    Marital status:      Spouse name: Not on file    Number of children: 3    Years of education: Not on file    Highest education level: Not on file   Occupational History    Not on file   Social Needs    Financial resource strain: Not on file    Food insecurity     Worry: Not on file     Inability: Not on file   [employer] needs     Medical: Not on file     Non-medical: Not on file   Tobacco Use    Smoking status: Former Smoker     Packs/day: 1.00     Years: 20.00     Pack years: 20.00     Types: Cigarettes     Quit date: 1980     Years since quittin.1    Smokeless tobacco: Never Used   Substance and Sexual Activity    Alcohol use: No     Alcohol/week: 0.0 standard drinks    Drug use: No    Sexual activity: Not Currently   Lifestyle    Physical activity     Days per week: Not on file     Minutes per session: Not on file    Stress: Not on file   Relationships    Social connections     Talks on phone: Not on file     Gets together: Not on file     Attends Taoist service: Not on file     Active member of club or organization: Not on file     Attends meetings of clubs or organizations: Not on file     Relationship status: Not on file    Intimate partner violence     Fear of current or ex partner: Not on file     Emotionally abused: Not on file     Physically abused: Not on file     Forced sexual activity: Not on file   Other Topics Concern    Not on file   Social History Narrative    Not on file     Current Facility-Administered Medications Medication Dose Route Frequency Provider Last Rate Last Admin    niCARdipine (CARDENE) 25 mg in sodium chloride 0.9 % 250 mL infusion  3-15 mg/hr Intravenous Continuous Mary Garg MD   Stopped at 02/19/21 1926    propofol injection  5-50 mcg/kg/min Intravenous Titrated Mary Garg MD 20.2 mL/hr at 02/19/21 1923 35 mcg/kg/min at 02/19/21 1923    labetalol (NORMODYNE;TRANDATE) 300 mg in dextrose 5 % 300 mL infusion  0.5-5 mg/min Intravenous Continuous Mary Garg MD   Stopped at 02/19/21 1927     Current Outpatient Medications   Medication Sig Dispense Refill    oxyCODONE-acetaminophen (PERCOCET) 7.5-325 MG per tablet Take 1 tablet by mouth every 8 hours as needed for Pain for up to 30 days. Intended supply: 30 days 90 tablet 0    gabapentin (NEURONTIN) 100 MG capsule Take 1 capsule by mouth nightly for 30 days.  30 capsule 0    furosemide (LASIX) 40 MG tablet Take 1 tablet by mouth daily 30 tablet 6    potassium chloride (KLOR-CON M) 20 MEQ extended release tablet Take 1 tablet by mouth daily 30 tablet 6    DULoxetine (CYMBALTA) 30 MG extended release capsule Take 1 capsule by mouth daily 30 capsule 3    Respiratory Therapy Supplies (NEBULIZER) DARINEL Used to give yourself breathing treatment 1 Device 0    ipratropium-albuterol (DUONEB) 0.5-2.5 (3) MG/3ML SOLN nebulizer solution Inhale 3 mLs into the lungs every 4 hours as needed for Shortness of Breath (wheezing coughing) 360 mL 5    ondansetron (ZOFRAN ODT) 4 MG disintegrating tablet Take 1 tablet by mouth 4 times daily as needed for Nausea or Vomiting 60 tablet 0    carvedilol (COREG) 6.25 MG tablet Take 1.5 tablets by mouth 2 times daily 180 tablet 3    amLODIPine (NORVASC) 5 MG tablet TAKE ONE TABLET BY MOUTH DAILY 90 tablet 3    warfarin (COUMADIN) 5 MG tablet TAKE 1 AND 1/2 TABLET BY MOUTH ON MONDAY, WEDNESDAY, AND FRIDAY AND TAKE ONE TABLET BY MOUTH DAILY ON ALL OTHER DAYS 120 tablet 5    diclofenac sodium (VOLTAREN) 1 % GEL Apply 2 g topically 4 times daily 1 Tube 5    lisinopril (PRINIVIL;ZESTRIL) 20 MG tablet Take 1 tablet by mouth daily 180 tablet 1    albuterol sulfate HFA (PROAIR HFA) 108 (90 Base) MCG/ACT inhaler Inhale 2 puffs into the lungs every 6 hours as needed for Wheezing 1 Inhaler 5    naloxone 4 MG/0.1ML LIQD nasal spray 1 spray by Nasal route as needed for Opioid Reversal 1 each 5     Allergies   Allergen Reactions    Levaquin [Levofloxacin In D5w] Nausea And Vomiting    Digoxin Other (See Comments)     Pt states had to go er due to it affected her heart in a bad way-can't recall what type of reaction       REVIEW OF SYSTEMS  10 systems reviewed, pertinent positives per HPI otherwise noted to be negative. PHYSICAL EXAM  BP (!) 111/52   Pulse 89   Temp 97.7 °F (36.5 °C) (Oral)   Resp 16   Wt 212 lb 1.3 oz (96.2 kg)   LMP  (LMP Unknown)   SpO2 92%   BMI 37.57 kg/m²    GENERAL APPEARANCE: Drowsy. Mumbles incomprehensibly to physical stimulation  HENT: Normocephalic. Atraumatic. PERRL. No facial asymmetry  HEART/CHEST: RRR. LUNGS: Respirations unlabored. CTAB. ABDOMEN: Soft, non-distended abdomen. Non tender to palpation. No guarding. No rebound. EXTREMITIES: No gross deformities. Withdraws all extremities to painful stimuli  SKIN: Warm and dry. Yeast infection under left breast  NEUROLOGICAL: arousable to physical stimulation. Incomprehensible speech. Withdraws all extremities to pain.      LABS  Results for orders placed or performed during the hospital encounter of 02/19/21   COVID-19, Rapid    Specimen: Nasopharyngeal Swab   Result Value Ref Range    SARS-CoV-2, NAAT Not Detected Not Detected   CBC Auto Differential   Result Value Ref Range    WBC 18.2 (H) 4.0 - 11.0 K/uL    RBC 5.21 (H) 4.00 - 5.20 M/uL    Hemoglobin 15.5 12.0 - 16.0 g/dL    Hematocrit 46.6 36.0 - 48.0 %    MCV 89.5 80.0 - 100.0 fL    MCH 29.8 26.0 - 34.0 pg    MCHC 33.2 31.0 - 36.0 g/dL    RDW 14.7 12.4 - 15.4 %    Platelets 332 178 - 450 K/uL    MPV 8.6 5.0 - 10.5 fL    Neutrophils % 84.1 %    Lymphocytes % 8.9 %    Monocytes % 6.6 %    Eosinophils % 0.0 %    Basophils % 0.4 %    Neutrophils Absolute 15.3 (H) 1.7 - 7.7 K/uL    Lymphocytes Absolute 1.6 1.0 - 5.1 K/uL    Monocytes Absolute 1.2 0.0 - 1.3 K/uL    Eosinophils Absolute 0.0 0.0 - 0.6 K/uL    Basophils Absolute 0.1 0.0 - 0.2 K/uL   Lactic Acid, Plasma   Result Value Ref Range    Lactic Acid 2.5 (H) 0.4 - 2.0 mmol/L   Urinalysis Reflex to Culture    Specimen: Urine, clean catch   Result Value Ref Range    Color, UA DK YELLOW Straw/Yellow    Clarity, UA Clear Clear    Glucose, Ur 250 (A) Negative mg/dL    Bilirubin Urine Negative Negative    Ketones, Urine 15 (A) Negative mg/dL    Specific Gravity, UA 1.026 1.005 - 1.030    Blood, Urine Negative Negative    pH, UA 7.0 5.0 - 8.0    Protein, UA 30 (A) Negative mg/dL    Urobilinogen, Urine 1.0 <2.0 E.U./dL    Nitrite, Urine Negative Negative    Leukocyte Esterase, Urine Negative Negative    Microscopic Examination YES     Urine Type NotGiven     Urine Reflex to Culture Not Indicated    Troponin   Result Value Ref Range    Troponin <0.01 <0.01 ng/mL   Brain Natriuretic Peptide   Result Value Ref Range    Pro- (H) 0 - 449 pg/mL   Urine Drug Screen   Result Value Ref Range    Drug Screen Comment: see below    Lipase   Result Value Ref Range    Lipase 16.0 13.0 - 60.0 U/L   Protime-INR   Result Value Ref Range    Protime 21.0 (H) 10.0 - 13.2 sec    INR 1.80 (H) 0.86 - 1.14   SPECIMEN REJECTION   Result Value Ref Range    Rejected Test CMP     Reason for Rejection see below    Comprehensive Metabolic Panel   Result Value Ref Range    Sodium 135 (L) 136 - 145 mmol/L    Potassium 3.8 3.5 - 5.1 mmol/L    Chloride 95 (L) 99 - 110 mmol/L    CO2 29 21 - 32 mmol/L    Anion Gap 11 3 - 16    Glucose 193 (H) 70 - 99 mg/dL    BUN 26 (H) 7 - 20 mg/dL    CREATININE 0.6 0.6 - 1.2 mg/dL    GFR Non-African American >60 >60    GFR  >60 >60 Calcium 9.4 8.3 - 10.6 mg/dL    Total Protein 7.5 6.4 - 8.2 g/dL    Albumin 3.9 3.4 - 5.0 g/dL    Albumin/Globulin Ratio 1.1 1.1 - 2.2    Total Bilirubin 1.3 (H) 0.0 - 1.0 mg/dL    Alkaline Phosphatase 79 40 - 129 U/L    ALT 23 10 - 40 U/L    AST 18 15 - 37 U/L    Globulin 3.6 g/dL   Microscopic Urinalysis   Result Value Ref Range    Hyaline Casts, UA 1 0 - 8 /LPF    WBC, UA 1 0 - 5 /HPF    RBC, UA 7 (H) 0 - 4 /HPF    Epithelial Cells, UA 1 0 - 5 /HPF   EKG 12 Lead   Result Value Ref Range    Ventricular Rate 97 BPM    Atrial Rate 58 BPM    QRS Duration 92 ms    Q-T Interval 358 ms    QTc Calculation (Bazett) 454 ms    R Axis 52 degrees    T Axis 51 degrees    Diagnosis       Atrial fibrillation  likely, but Poor data quality, interpretation may be adversely affectedLow voltage QRSAbnormal ECGWhen compared with ECG of 22-DEC-2020 14:15,No significant change notedConfirmed by WILLIAM Barahona MD ([id number]) on 2/19/2021 5:35:46 PM   TYPE AND SCREEN   Result Value Ref Range    ABO/Rh A POS     Antibody Screen NEG        I have reviewed all labs for this visit. ECG  The Ekg interpreted by me shows  Atrial fibrillation with a rate of 97  Axis is normal  QTc is normal  ST Segments: Nonspecific ST changes  No significant change from prior EKG dated January 15, 2021    RADIOLOGY  Ct Head Wo Contrast    Result Date: 2/19/2021  EXAMINATION: CT OF THE HEAD WITHOUT CONTRAST  2/19/2021 2:41 pm TECHNIQUE: CT of the head was performed without the administration of intravenous contrast. Dose modulation, iterative reconstruction, and/or weight based adjustment of the mA/kV was utilized to reduce the radiation dose to as low as reasonably achievable. COMPARISON: 11/09/2019 HISTORY: ORDERING SYSTEM PROVIDED HISTORY: headache TECHNOLOGIST PROVIDED HISTORY: Has a \"code stroke\" or \"stroke alert\" been called? ->No Reason for exam:->headache Reason for Exam: HA Acuity: Acute Type of Exam: Initial FINDINGS: BRAIN/VENTRICLES: There is a intubation cxr Reason for Exam: post intubation; NG/OG placement FINDINGS: There is been interval placement of an endotracheal tube, the tip projects over the trachea approximately 2 cm above the tawana. An enteric tube courses off the inferior margin the film. The lungs remain clear. Tubes and lines as above. Xr Chest Portable    Result Date: 2/19/2021  EXAMINATION: ONE XRAY VIEW OF THE CHEST 2/19/2021 2:33 pm COMPARISON: 03/09/2020 HISTORY: ORDERING SYSTEM PROVIDED HISTORY: ams TECHNOLOGIST PROVIDED HISTORY: Reason for exam:->ams Reason for Exam: AMS Acuity: Acute Type of Exam: Initial FINDINGS: There is left basilar scarring. Calcified granulomatous disease is noted. The cardiac silhouette is stable. There is no pneumothorax or pleural effusion. Status post bilateral shoulder arthroplasty. The bones are demineralized. 1.  No acute abnormality. ED COURSE/MDM  Patient seen and evaluated. At presentation, patient was drowsy, not responsive to verbal or physical stimulation. Patient was moaning on sternal rub. No family members present at bedside initially. Differential diagnosis includes hypoglycemia, intracranial bleed, stroke, metabolic encephalopathy, urinary tract infection, among others. Given this, CBC, CMP, chest x-ray, urinalysis, and CT head without obtained. CT shows large intraparenchymal bleed. Neurosurgery consulted. per chart review, patient has history of atrial fibrillation and is on warfarin. INR and PTT pending. Pharmacy consulted for reversal of anticoagulation. INR 1.8. Vitamin K 10 mg IV ordered. As patient hypertensive, patient given 10 mg IV labetalol. Patient placed on cardiac monitor for close observation. on reassessment, blood pressure less than 112 systolic. Will give another 10mg IV labetalol PRN if SBP >140. spoke with neurosurgery Jt Gann at [facility] at 314pm who said patient is not a surgical candidate.   He recommended giving vitamin K but not doing Kcentra for reversal.  Recommended blood pressure control with systolic blood pressure less than 150; Keppra 1g. Recommended admission to [facility] since patient is not a surgical candidate. Keppra given. After multiple attempts, I was able to get a hold of patient's Leatha Thorndale at [phone] at 320pm. POA wished for patient to remain full code until she could return to [facility] to see the patient in person. Discussed intubation with POA who gave consent. [facility] back to say patient could not be admitted to [facility] as patient not a DNR CC and unable to provide appropriate care for intracranial bleed. Deferred admission to [address] [place], [place] [address]ist consulted and spoke with Dr. Clara Vargas who refused admission as neurosurgery did not accept patient for transfer. Given this, [facility] Neurosurgery again consulted and I spoke with Dr. Frederick Essex who agreed to see the patient after patient admitted to [facility]. In anticipation of the transfer process, discussed intubation with family members and obtained consent from son and POA. Patient intubated as below. Patient placed on propofol for sedation. Spoke with Dr. Sarah Snyder who graciously accepted the patient for transfer. PROCEDURE:  ENDOTRACHEAL INTUBATION  The benefits, risks, and alternatives were discussed with [facility] or their surrogate. An opportunity to ask questions was provided, and consent was given for the procedure. [facility] was pre-oxygenated as best as possible. Suction was ready. The patient was sedated, then paralyzed; please see the chart for the medications and dosages administered. (etomidate and succinylcholine). The vocal cords were visualized, and the endotracheal tube was inserted without complication. Tracheal position was confirmed using auscultation, capnometry, tube condensation, and chest x-ray. The tube was appropriately secured.  Sedation was provided for endotracheal tube and ventilatory comfort. PROCEDURE NOTE - Ultrasound guided peripheral IV placement  Indication: unable to obtain adequate PIV access without ultrasound for medication administration, fluid resuscitation and/or lab draw    Views:  Short access view of target vein: Adequate    Images obtained with findings:   Vein compressible: yes  Needle tip within vein: yes    Impression:   Successful cannulation of vein: yes    The left antecubital fossa and forearm were prepped with chloraprep and tourniquet applied. Using a 18g angiocath, under direct ultrasound visualization and guidance, IV access was established and secured on first attempt. The PIV withdrew blood easily and flushed easily. Tourniquet removed. Complications: none    Images obtained by Louise Chen, interpreted by Louise Chen. I provided at least 120 minutes of critical care excluding separately billable procedures. Pt was seen during the [place] 19 pandemic. Appropriate PPE worn by ME during patient encounters. Pt seen during a time with constrained hospital bed capacity and other potential inpatient and outpatient resources were constrained due to the viral pandemic.      During the patient's ED course, the patient was given:  Medications   niCARdipine (CARDENE) 25 mg in sodium chloride 0.9 % 250 mL infusion (0 mg/hr Intravenous Held 2/19/21 1926)   propofol injection (35 mcg/kg/min × 96.2 kg Intravenous Rate/Dose Change 2/19/21 1923)   labetalol (NORMODYNE;TRANDATE) 300 mg in dextrose 5 % 300 mL infusion (0 mg/min Intravenous Held 2/19/21 1927)   labetalol (NORMODYNE;TRANDATE) injection 10 mg (10 mg Intravenous Given 2/19/21 1522)   labetalol (NORMODYNE;TRANDATE) injection 10 mg (10 mg Intravenous Given 2/19/21 1607)   phytonadione (ADULT) (VITAMIN K) 10 mg in dextrose 5 % 100 mL IVPB (0 mg Intravenous Stopped 2/19/21 1608)   levetiracetam (KEPPRA) 1000 mg/100 mL IVPB (0 mg Intravenous Stopped 2/19/21 1654)   etomidate (AMIDATE) injection 28.8 mg (28.8 mg Intravenous Given 2/19/21 8019)   succinylcholine (ANECTINE) injection 100 mg (100 mg Intravenous Given 2/19/21 1750)        CLINICAL IMPRESSION  1. Altered mental status, unspecified altered mental status type    2. Intraparenchymal hemorrhage of brain ([facility] [address]    3. Essential hypertension        Blood pressure (!) 111/52, pulse 89, temperature 97.7 °F (36.5 °C), temperature source Oral, resp. rate 16, weight 212 lb 1.3 oz (96.2 kg), SpO2 92 %, not currently breastfeeding. DISPOSITION  [address] [facility] Zeb Love was transferred to [facility] [employer]       Patient was given scripts for the following medications. I counseled patient how to take these medications. New Prescriptions    No medications on file       Follow-up with:  No follow-up provider specified. DISCLAIMER: This chart was created using Dragon dictation software. Efforts were made by me to ensure accuracy, however some errors may be present due to limitations of this technology and occasionally words are not transcribed correctly.        Shonna Cagle MD  02/21/21 6879

## 2021-02-19 NOTE — ED NOTES
Bed: [facility]  Expected date:   Expected time:   Means of arrival: [facility]  Comments:  [address] Mcgovern, RN  02/19/21 3924

## 2021-02-20 NOTE — PROGRESS NOTES
tolerate the most appropriate form of nutrition therapy this admission  Monitoring: Nutrition Progression      OBJECTIVE DATA:  · Nutrition-Focused Physical Findings: propofol @ 10.9 ml/hr (288 kcals), trace BLE edema, OGT to suction   · Wounds None      Past Medical History:   Diagnosis Date    A fib     Arthritis     COPD (chronic obstructive pulmonary disease) (HCC)     Coronary artery disease     Femur fracture ([facility] 01/2016    left    Fibromyalgia     GERD (gastroesophageal reflux disease)     History of shingles     Hypertension     PONV (postoperative nausea and vomiting)     Rotator cuff disorder     left     Ulcer of gastroesophageal junction 2006        ANTHROPOMETRICS  Current Height: 5' 3\" (160 cm)  Current Weight: 200 lb 9.9 oz (91 kg)    Admission weight: 200 lb 9.9 oz (91 kg)  Ideal Bodyweight 115 lb    Weight Changes none noted        BMI BMI (Calculated): 35.6    Wt Readings from Last 50 Encounters:   02/19/21 200 lb 9.9 oz (91 kg)   02/19/21 212 lb 1.3 oz (96.2 kg)   01/15/21 209 lb (94.8 kg)   07/14/20 198 lb (89.8 kg)   05/29/20 197 lb (89.4 kg)   03/09/20 197 lb 5 oz (89.5 kg)   03/06/20 192 lb (87.1 kg)   02/25/20 192 lb (87.1 kg)   02/11/20 195 lb (88.5 kg)   01/24/20 197 lb 12.8 oz (89.7 kg)     COMPARATIVE STANDARDS  Estimated Total Kcals/Day : 14-16 Current Bodyweight (91 kg) 4989-7930 kcal    Estimated Total Protein (g/day) : 1.5-2.0 Ideal Bodyweight  (52 kg)  g/day  Estimated Daily Total Fluid (ml/day): 1 mL/kcal per day     Food / Nutrition-Related History  Pre-Admission / Home Diet:  Pre-Admission/Home Diet: General   Home Supplements / Herbals:    none noted  Food Restrictions / Cultural Requests:    none noted    Current Nutrition Therapies   Diet NPO Effective Now     PO Intake: NPO  PO Supplement: NPO   PO Supplement Intake: NPO  IVF: none     NUTRITION RISK LEVEL: Risk Level: High     Pablo Portillo, [address] [place] [address]  [place]:  [phone]  Office:  [phone]

## 2021-02-20 NOTE — CONSULTS
ICU HISTORY AND PHYSICAL       Hospital Day:   ICU Day:                                                          Code:DNR-CCA  Admit Date: 2/19/2021  PCP: Shea Claude, MD                                  CC: AMS     HISTORY OF PRESENT ILLNESS:   Luis Carlos Tyler is a 80 y.o. female w/PMHx of COPD, CAD, HTN, and atrial fibrillation on warfarin who presented with AMS to [facility] and found to have a large R frontal intraparenchymal hemorrhage with intraventricular extension.      At time of presentation pt was non-responsive to vocal stimuli, but withdrew limbs to pain.  Pt was found by family and last known normal is unknown. History from chart review. Initial workup including head CT showed a large intraparenchymal hemorrhage within the right frontal lobe. Patient was given 10 mg IV vitamin K for warfarin reversal. INR 1.8. Patient was also given 10 mg IV labetalol for SBP above 140. Due to low GCS and concern for airway protection patient was intubated. Neurosurgery evaluated patient and concluded that she was not a surgical candidate and that prognosis was grim.      Patient was transferred to [facility] in the setting of code status discussion, as POA, her granddaughter, was currently out of state. Pt was placed in ICU and has remained stable since admission. Vent settings are 50%/12/400mL/5. Pt at time of initial assessment was unresponsive to both vocal and physical stimuli, on sedation of Propofol 20mcg/kg/min. Discussed prognosis with grand-daughter and decided that patient would be DNR-CCA until she is able to see the patient tomorrow to decide upon hospice. Interval hx:  Pt not responsive. Intubated. Vent Settings: Vent Mode: AC/PRVC Rate Set: 16 bmp/Vt Ordered: 400 mL/ /FiO2 : 40 % PEEP 5  ABG this AM,  7.490/37.1/136.6.      PAST HISTORY:     Past Medical History:   Diagnosis Date    A fib     Arthritis     COPD (chronic obstructive pulmonary disease) (HCC)     Coronary artery disease     Femur fracture ([address] 01/2016    left    Fibromyalgia     GERD (gastroesophageal reflux disease)     History of shingles     Hypertension     PONV (postoperative nausea and vomiting)     Rotator cuff disorder     left     Ulcer of gastroesophageal junction 2006       Past Surgical History:   Procedure Laterality Date    ACROMIOPLASTY Left 4/27/15    Dr Constantine Zuleta  age 21   [address] COLONOSCOPY  05.01.03    COLONOSCOPY  02/2017    no need for further colonoscopy per Dr. Maris Vallejo N/A 11/1/2018    Ermalene Kenyetta performed by Fitz Suresh MD at [address] Left 01/2016     Left intertrochanteric and shaft femur comminuted fracture, status post Gamma nail and cables.  SC ESOPHAGOGASTRODUODENOSCOPY US SCOPE W/ADJ STRXRS N/A 11/1/2018    ESOPHAGEAL ENDOSCOPIC ULTRASOUND EXAM performed by Fitz Suresh MD at [address]      right     UPPER GASTROINTESTINAL ENDOSCOPY N/A 11/1/2018    EGD BIOPSY performed by Fitz Suresh MD at [address]:   The patient lives at    Alcohol:  Illicit drugs: no use  Tobacco:      Family History:  Family History   Problem Relation Age of Onset    Stroke Mother     COPD Father     Coronary Art Dis Father     Other Brother         AAA rupture    Other Brother         cerebral hemorrhage    Heart Attack Son         cerebral hemorrhage as well    Cancer Sister         x2 sisters    [facility] Cancer Sister         ovarian        MEDICATIONS:     No current facility-administered medications on file prior to encounter. Current Outpatient Medications on File Prior to Encounter   Medication Sig Dispense Refill    oxyCODONE-acetaminophen (PERCOCET) 7.5-325 MG per tablet Take 1 tablet by mouth every 8 hours as needed for Pain for up to 30 days.  Intended supply: 30 days 90 tablet 0    gabapentin (NEURONTIN) 100 MG capsule Take 1 capsule by mouth nightly for 30 days.  30 capsule 0    furosemide (LASIX) 40 MG tablet Take 1 tablet by mouth daily 30 tablet 6    potassium chloride (KLOR-CON M) 20 MEQ extended release tablet Take 1 tablet by mouth daily 30 tablet 6    DULoxetine (CYMBALTA) 30 MG extended release capsule Take 1 capsule by mouth daily 30 capsule 3    Respiratory Therapy Supplies (NEBULIZER) DARINEL Used to give yourself breathing treatment 1 Device 0    ipratropium-albuterol (DUONEB) 0.5-2.5 (3) MG/3ML SOLN nebulizer solution Inhale 3 mLs into the lungs every 4 hours as needed for Shortness of Breath (wheezing coughing) 360 mL 5    ondansetron (ZOFRAN ODT) 4 MG disintegrating tablet Take 1 tablet by mouth 4 times daily as needed for Nausea or Vomiting 60 tablet 0    carvedilol (COREG) 6.25 MG tablet Take 1.5 tablets by mouth 2 times daily 180 tablet 3    amLODIPine (NORVASC) 5 MG tablet TAKE ONE TABLET BY MOUTH DAILY 90 tablet 3    warfarin (COUMADIN) 5 MG tablet TAKE 1 AND 1/2 TABLET BY MOUTH ON MONDAY, WEDNESDAY, AND FRIDAY AND TAKE ONE TABLET BY MOUTH DAILY ON ALL OTHER DAYS 120 tablet 5    diclofenac sodium (VOLTAREN) 1 % GEL Apply 2 g topically 4 times daily 1 Tube 5    lisinopril (PRINIVIL;ZESTRIL) 20 MG tablet Take 1 tablet by mouth daily 180 tablet 1    albuterol sulfate HFA (PROAIR HFA) 108 (90 Base) MCG/ACT inhaler Inhale 2 puffs into the lungs every 6 hours as needed for Wheezing 1 Inhaler 5    naloxone 4 MG/0.1ML LIQD nasal spray 1 spray by Nasal route as needed for Opioid Reversal 1 each 5         Scheduled Meds:   levetiracetam  1,000 mg Intravenous Q12H    insulin lispro  0-6 Units Subcutaneous Q6H    sodium chloride flush  10 mL Intravenous 2 times per day      Continuous Infusions:   propofol 20 mcg/kg/min (02/20/21 0050)    dextrose       PRN Meds:labetalol, glucose, dextrose, glucagon (rDNA), dextrose, sodium chloride flush, acetaminophen, promethazine **OR** ondansetron, intact bilaterally. Unresponsive. PERRL       Vent Settings: Vent Mode: AC/PRVC Rate Set: 16 bmp/Vt Ordered: 400 mL/ /FiO2 : 40 %    Recent Labs     02/20/21  0421   PHART 7.490*   WNJ3PFR 37.1   PO2ART 136.6*           DATA:       Labs:  CBC:   Recent Labs     02/19/21  1450 02/20/21  0436   WBC 18.2* 17.9*   HGB 15.5 13.9   HCT 46.6 41.9    243       BMP:   Recent Labs     02/19/21  1545 02/20/21  0436   * 136   K 3.8 3.8   CL 95* 97*   CO2 29 26   BUN 26* 36*   CREATININE 0.6 0.9   GLUCOSE 193* 177*     LFT's:   Recent Labs     02/19/21  1545   AST 18   ALT 23   BILITOT 1.3*   ALKPHOS 79     Troponin:   Recent Labs     02/19/21  1450   TROPONINI <0.01     BNP:No results for input(s): BNP in the last 72 hours. ABGs:   Recent Labs     02/20/21  0421   PHART 7.490*   TKV3GSA 37.1   PO2ART 136.6*     INR:   Recent Labs     02/19/21  1450   INR 1.80*       U/A:  Recent Labs     02/19/21  1526   COLORU DK YELLOW   PHUR 7.0  7.0   WBCUA 1   RBCUA 7*   CLARITYU Clear   SPECGRAV 1.026   LEUKOCYTESUR Negative   UROBILINOGEN 1.0   BILIRUBINUR Negative   BLOODU Negative   GLUCOSEU 250*       CT HEAD WO CONTRAST   Final Result        1. Large right frontal lobe hemorrhage measuring approximately 6 cm. This results in right to left midline shift of 1 cm. 2.  Intraventricular extension of hemorrhage with mild hydrocephalus. EKG:   Echo:  Micro:     ASSESSMENT AND PLAN:   Alfredo Spencer is a 80 y.o. female, who was admitted for large IPH with intraventricular hemorrhage.      Hemorrhagic Stroke: Large IPH in R frontal lobe with ventricular extension in setting of warfarin use. Reversed with vitamin K. MLS of 9 mm. Poor prognosis. LKW unclear. Discussed case with patient's granddaughter, and she wants to see patient before formally discussing DNR-CC. Patient will be DNR-CCA.    - Neurosurgery Consulted, appreciate recs:  - SBP < 160; PRN labetalol, hydralazine   - hold AC/antiplatelet, SCDs  - Seizure ppx with Keppra 1000 mg BID  - [facility] 2/20: Large frontal lobe h'ge approx 6cm, resulting in midline shift of 1 cm. Intraventricular extension of h'ge with mild hydrocephalus   - palliative care consult     Acute Respiratory Failure: intubated due to neurologic insult/airway protection. - ventilator settings: Vent Mode: AC/PRVC Rate Set: 16 bmp/Vt Ordered: 400 mL/ /FiO2 : 50 %/PEEP 5  - sedation: propofol     Leukocytosis: likely reactive. - monitor CBC     Chronic Conditions:  Atrial Fibrillation: on warfarin  - hold warfarin  - telemetry  - rate control as needed, follow INR     COPD:  - patient intubated  - hold home meds     CAD:  - not on medications     HTN:  - held amlodipine, lisinopril   - PRN meds as needed, pressures well-controlled at this time.      Mood Disorder:  - held cymbalta    Code Status:DNR-CCA  FEN: NPO  PPX:  SCD  DISPO: ICU     This patient has been staffed and discussed with the attending.   -----------------------------  Magi Bocanegra, PGY-1  2/20/2021  8:45 AM       Patient was seen, examined and discussed with Dr. Francine Sharma. I agree with the history of present illness, past medical/surgical histories, family history, social history, medication list and allergies as listed. The review of systems is as noted above. My physical exam confirms the findings listed   Chart was reviewed including labs, CXR, CT scan, EKG and medical records confirm the findings noted     Large intraparenchymal hemorrhage with ventricular extension and midline shift. Acute respiratory failure on mechanical vent support. , RR 16, FiO2 50% and PEEP 5. ABG 7.40/37/ 136  A.fib, was on warfarin   Renal:  PRANAV creatinine is up from 0.6 to 0.9. UOP is low   Leukocytosis     Received Vit K. There is extension of hematoma on f/u CT  On labetalol and hydralazine to keep SBP< 160.  cardene is off   Hold propofol and reassess   Continue supportive care. Overall poor prognosis.   Code status changed to

## 2021-02-20 NOTE — PROGRESS NOTES
Patient admitted to room 4516. Patient remains intubated, on 15 mcg Propofol. Patient is responding to painful stimuli in all 4 extremities. Pupils equal, L reacting more brisk than the R, but both are reactive. Patient suctioned per ETT, thick clear secretions present. Lungs diminished. Patient was given a complete CHG bath and gown changed. Patient has a red, moist yeast like rash under bilateral breasts. Interdry applied to bilateral breast folds and ABD folds. See complete 4 eyes assessment note. Patient tense, and coughing/gagging on ET tube. BP slightly elevated. Propofol increased to 20 mcg/kg/min. Will continue to monitor. Stroke Admission    I agree as the admission nurse that I have completed a thorough stroke assessment and completed the admission on the patient. ALL assessment areas listed below have been addressed and completed. Presentation: ICH/ IVH    Handoff assessment completed with EMS crew    Current NIHSS 28 . [x]   Education Assessment- provided to granddaughter. [x]   Individualized Stroke/TIA Education template added, including patient specific risk factors: age, use of Coumadin  [x]   Individualized Stroke/TIA Care Plan template added  [x]   Swallow screen completed using the [employer] Protocol, and documented on flowsheet PRIOR to oral intake: Fail- patient intubated. [x]   VTE Prophylaxis: SCDs ordered/addressed; SCDs: SCD applied. (If Medication, choose N/A and write Medication name.  ASA, Plavix and TPA are not VTE prophylaxis. )  [x]   Stroke education booklet given, and education initiated with patient and/or caregiver      Nurse eSignature: Desirae Gee RN

## 2021-02-20 NOTE — PROGRESS NOTES
Hospitalist Progress Note      PCP: Cori Perera MD    Chief Complaint. Patient is a 66-year-old female with past medical history of COPD, CAD, hypertension, atrial fibrillation on Coumadin who presented to hospital for altered mental status from [facility] [place] [facility] AT [place], on further evaluation patient was found to have intracranial hemorrhage not amenable to neurosurgical intervention. Date of Admission: 2/19/2021    Subjective:   denies chest pain, nausea, vomiting, shortness of breath, fever or chills. mention feels overall better    Medications:  Reviewed    Infusion Medications    propofol 20 mcg/kg/min (02/20/21 1110)    dextrose       Scheduled Medications    levetiracetam  1,000 mg Intravenous Q12H    insulin lispro  0-6 Units Subcutaneous Q6H    sodium chloride flush  10 mL Intravenous 2 times per day     PRN Meds: labetalol, glucose, dextrose, glucagon (rDNA), dextrose, sodium chloride flush, acetaminophen, promethazine **OR** ondansetron, hydrALAZINE      Intake/Output Summary (Last 24 hours) at 2/20/2021 1232  Last data filed at 2/20/2021 1000  Gross per 24 hour   Intake 187 ml   Output 670 ml   Net -483 ml       Physical Exam Performed:    /72   Pulse 101   Temp 98 °F (36.7 °C) (Oral)   Resp 19   Ht 5' 3\" (1.6 m)   Wt 200 lb 9.9 oz (91 kg)   LMP  (LMP Unknown)   SpO2 96%   BMI 35.54 kg/m²     General appearance: Intubated and sedated  Respiratory:  Normal respiratory effort. Clear to auscultation, bilaterally without Rales/Wheezes/Rhonchi. Cardiovascular: irregular rate and rhythm with normal S1/S2 without murmurs or rubs  Abdomen: Soft, non-tender, non-distended, normal bowel sounds.   Musculoskeletal: No cyanosis or edema bilaterally  Neurologic: Intubated and sedated  Psychiatric: Intubated and sedated  Peripheral Pulses: +2 palpable, equal bilaterally       Labs:   Recent Labs     02/19/21  1450 02/20/21  0436   WBC 18.2* 17.9*   HGB 15.5 13.9   HCT 46.6 41.9    243 Recent Labs     02/19/21  1545 02/20/21  0436   * 136   K 3.8 3.8   CL 95* 97*   CO2 29 26   BUN 26* 36*   CREATININE 0.6 0.9   CALCIUM 9.4 9.0     Recent Labs     02/19/21  1545   AST 18   ALT 23   BILITOT 1.3*   ALKPHOS 79     Recent Labs     02/19/21  1450   INR 1.80*     Recent Labs     02/19/21  1450   TROPONINI <0.01       Urinalysis:      Lab Results   Component Value Date    NITRU Negative 02/19/2021    WBCUA 1 02/19/2021    BACTERIA 1+ 12/22/2020    RBCUA 7 02/19/2021    BLOODU Negative 02/19/2021    SPECGRAV 1.026 02/19/2021    GLUCOSEU 250 02/19/2021    GLUCOSEU Negative 06/14/2010       Radiology:  CT HEAD WO CONTRAST   Final Result        1. Large right frontal lobe hemorrhage measuring approximately 6 cm. This results in right to left midline shift of 1 cm. 2.  Intraventricular extension of hemorrhage with mild hydrocephalus. Assessment/Plan:    Active Hospital Problems    Diagnosis    Brain bleed [facility]) [I61.9]     Patient is a 59-year-old female with past medical history of COPD, CAD, hypertension, atrial fibrillation on Coumadin who presented to hospital for altered mental status from [facility] AT [place], on further evaluation patient was found to have intracranial hemorrhage not amenable to neurosurgical intervention.     Assessment  Acute encephalopathy likely secondary to intracranial hemorrhage, Hemorrhagic stroke  Atrial fibrillation on Coumadin   Acute hypoxic respiratory failure  Requiring intubation  COPD  Hypertension  CAD      Plan  Neurosurgery has been consulted-no neurosurgical intervention at this time, patient is status post vitamin K, continue Keppra with seizure prophylaxis  Monitor neurochecks  Palliative care consult  Pulmonary consulted for ventilator management  Continue to monitor CBC, BMP  Leukocytosis likely reactive, monitor  INR is 1.80, monitor  Discussed with patient's daughter at the bedside-mentions she wants to discuss with other family members regarding the plan of care and make decision  DVT prophylaxis-SCDs only  Diet: Diet NPO Effective Now  Code Status: DNR-CCA    PT/OT Eval Status: ordered    Dispo -continue to monitor in ICU, currently intubated    Bre Liz MD

## 2021-02-20 NOTE — ED NOTES
CARLOS here to transport to [address] [place] [address], [address] [place] [address]  02/19/21 2040

## 2021-02-20 NOTE — ED NOTES
Called MANASA Dior, she is out of town, she states she talked to the Patient yesterday afternoon and she was her normal.  She did not hear that patient had any falls or injury     Radha Lopez RN  02/19/21 1918

## 2021-02-20 NOTE — PROGRESS NOTES
Clinical Pharmacy Consult Note    80 y.o. female admitted with ICH/IVH. Pharmacy has been asked by Dr. Shaan Banerjee to adjust all drips to normal saline as appropriate based on compatibility to avoid fluid shifts since D5 is osmotically active. The following intermittent IV drips/infusions have been adjusted to saline:  Keppra - already in NS    The following medications must remain in D5W due to incompatibility with normal saline:  Amphotericin  Mycophenolate  Nitroprusside  Penicillin G Potassium    Please be aware that patient has D5W ordered as part of hypoglycemia orderset. Total IV fluid delivered to patient over last 24h: ~300 mL    RPh will follow daily to ensure all new IVPBs + drips are in NS. Please call with questions.   Erickson Vang, PharmD, BCPS  Main pharmacy: [id number]  2/20/2021 7:24 AM

## 2021-02-20 NOTE — ED NOTES
Patients son at bedside was updated on POC and intubation.   Patients Clara Thanh called and also updated on [facility]  02/19/21 1916

## 2021-02-20 NOTE — H&P
ICU HISTORY AND PHYSICAL       Hospital Day:   ICU Day:                                                          Code:Prior  Admit Date: 2/19/2021  PCP: Jenn Dozier MD                                  CC: AMS     HISTORY OF PRESENT ILLNESS:   Eppie Bloch is 79 yo female w/ pmh of afib, COPD, CAD who presented to the ED at [facility] w/ [facility]. Time of presentation pt was non-responsive to vocal stimuli, but withdrew limbs to pain. Pt was found by family and last known normal is unknown. History from chart review. Initial workup including head CT showed a large intraparenchymal hemorrhage within the right frontal lobe. Neurosurgery was consulted and pt was transferred to [facility]. Pt was placed in ICU and has remained stable since admission. Pt at time of initial assessment was unresponsive to both vocal and physical stimuli, on sedation of Propofol 20mcg/kg/min. PAST HISTORY:     Past Medical History:   Diagnosis Date    A fib     Arthritis     COPD (chronic obstructive pulmonary disease) (HCC)     Coronary artery disease     Femur fracture ([address] 01/2016    left    Fibromyalgia     GERD (gastroesophageal reflux disease)     History of shingles     Hypertension     PONV (postoperative nausea and vomiting)     Rotator cuff disorder     left     Ulcer of gastroesophageal junction 2006       Past Surgical History:   Procedure Laterality Date    ACROMIOPLASTY Left 4/27/15    Dr Tracey Ritter  age 21   [address] [facility] COLONOSCOPY  05.01.03    COLONOSCOPY  02/2017    no need for further colonoscopy per Dr. Sangeeta Devlin 11/1/2018    [place] Quezada performed by Carey Marino MD at [facility] [address] Left 01/2016     Left intertrochanteric and shaft femur comminuted fracture, status post Gamma nail and cables.     ME ESOPHAGOGASTRODUODENOSCOPY US SCOPE W/ADJ STRXRS N/A 11/1/2018 ESOPHAGEAL ENDOSCOPIC ULTRASOUND EXAM performed by Pancho Ferro MD at [address]      right     UPPER GASTROINTESTINAL ENDOSCOPY N/A 11/1/2018    EGD BIOPSY performed by Pancho Ferro MD at [address]:   The patient lives at    Alcohol:  Illicit drugs: no use  Tobacco:      Family History:  Family History   Problem Relation Age of Onset    Stroke Mother     COPD Father     Coronary Art Dis Father     Other Brother         AAA rupture    Other Brother         cerebral hemorrhage    Heart Attack Son         cerebral hemorrhage as well    Cancer Sister         x2 sisters    [address] [facility]\"" Cancer Sister         ovarian        MEDICATIONS:     No current facility-administered medications on file prior to encounter. Current Outpatient Medications on File Prior to Encounter   Medication Sig Dispense Refill    oxyCODONE-acetaminophen (PERCOCET) 7.5-325 MG per tablet Take 1 tablet by mouth every 8 hours as needed for Pain for up to 30 days. Intended supply: 30 days 90 tablet 0    gabapentin (NEURONTIN) 100 MG capsule Take 1 capsule by mouth nightly for 30 days.  30 capsule 0    furosemide (LASIX) 40 MG tablet Take 1 tablet by mouth daily 30 tablet 6    potassium chloride (KLOR-CON M) 20 MEQ extended release tablet Take 1 tablet by mouth daily 30 tablet 6    DULoxetine (CYMBALTA) 30 MG extended release capsule Take 1 capsule by mouth daily 30 capsule 3    Respiratory Therapy Supplies (NEBULIZER) DARINEL Used to give yourself breathing treatment 1 Device 0    ipratropium-albuterol (DUONEB) 0.5-2.5 (3) MG/3ML SOLN nebulizer solution Inhale 3 mLs into the lungs every 4 hours as needed for Shortness of Breath (wheezing coughing) 360 mL 5    ondansetron (ZOFRAN ODT) 4 MG disintegrating tablet Take 1 tablet by mouth 4 times daily as needed for Nausea or Vomiting 60 tablet 0    carvedilol (COREG) 6.25 MG tablet Take 1.5 tablets by mouth 2 times daily 180 tablet 3 -hold home coreg  -continue to monitor, rate regulate if HR >100's       Code Status:DNR-CCA   FEN: NPO  PPX:  SCD  DISPO:     This patient has been staffed and discussed with Taylor Hamlin MD.   -----------------------------  Thomas Vick, Medical Student   2/19/2021  11:11 PM

## 2021-02-20 NOTE — H&P
ICU HISTORY AND PHYSICAL       Hospital Day:   ICU Day:                                                          Code:DNR-CCA  Admit Date: 2/19/2021  PCP: Marychuy Rocha MD                                  CC: AMS    HISTORY OF PRESENT ILLNESS:   Parminder Ponce is a 80 y.o. female w/PMHx of COPD, CAD, HTN, and atrial fibrillation on warfarin who presented with AMS to [facility] and found to have a large R frontal intraparenchymal hemorrhage with intraventricular extension. At time of presentation pt was non-responsive to vocal stimuli, but withdrew limbs to pain. Pt was found by family and last known normal is unknown. History from chart review. Initial workup including head CT showed a large intraparenchymal hemorrhage within the right frontal lobe. Patient was given 10 mg IV vitamin K for warfarin reversal. INR 1.8. Patient was also given 10 mg IV labetalol for SBP above 140. Due to low GCS and concern for airway protection patient was intubated. Neurosurgery evaluated patient and concluded that she was not a surgical candidate and that prognosis was grim. Patient was transferred to [facility] in the setting of code status discussion, as POA, her granddaughter, was currently out of state. Pt was placed in ICU and has remained stable since admission. Vent settings are 50%/12/400mL/5. Pt at time of initial assessment was unresponsive to both vocal and physical stimuli, on sedation of Propofol 20mcg/kg/min. Discussed prognosis with grand-daughter and decided that patient would be DNR-CCA until she is able to see the patient tomorrow to decide upon hospice.     PAST HISTORY:     Past Medical History:   Diagnosis Date    A fib     Arthritis     COPD (chronic obstructive pulmonary disease) (HCC)     Coronary artery disease     Femur fracture ([address] 01/2016    left    Fibromyalgia     GERD (gastroesophageal reflux disease)     History of shingles     Hypertension     PONV (postoperative nausea and vomiting)     Rotator cuff disorder     left     Ulcer of gastroesophageal junction        Past Surgical History:   Procedure Laterality Date    ACROMIOPLASTY Left 4/27/15    Dr Rupinder Castaneda  age 21   [address] COLONOSCOPY  03    COLONOSCOPY  2017    no need for further colonoscopy per Dr. Jadyn Ramirez 2018    Radha Rosas [facility] 2016     Left intertrochanteric and shaft femur comminuted fracture, status post Gamma nail and cables.  [place] ESOPHAGOGASTRODUODENOSCOPY US SCOPE W/ADJ STRXRS N/A 2018    ESOPHAGEAL ENDOSCOPIC ULTRASOUND EXAM performed by Aurora Mcelroy MD at [address]      right     UPPER GASTROINTESTINAL ENDOSCOPY N/A 2018    EGD BIOPSY performed by Aurora Mcelroy MD at [address]:   Social History     Tobacco Use    Smoking status: Former Smoker     Packs/day: 1.00     Years: 20.00     Pack years: 20.00     Types: Cigarettes     Quit date: 1980     Years since quittin.1    Smokeless tobacco: Never Used   Substance Use Topics    Alcohol use: No     Alcohol/week: 0.0 standard drinks    Drug use: No       Family History:  Family History   Problem Relation Age of Onset    Stroke Mother     COPD Father     Coronary Art Dis Father     Other Brother         AAA rupture    Other Brother         cerebral hemorrhage    Heart Attack Son         cerebral hemorrhage as well    Cancer Sister         x2 sisters    Edith Barker Cancer Sister         ovarian        MEDICATIONS:     No current facility-administered medications on file prior to encounter.       Current Outpatient Medications on File Prior to Encounter   Medication Sig Dispense Refill    oxyCODONE-acetaminophen (PERCOCET) 7.5-325 MG per tablet Take 1 tablet by mouth every 8 hours as needed for Pain for up to 30 days. Intended supply: 30 days 90 tablet 0    gabapentin (NEURONTIN) 100 MG capsule Take 1 capsule by mouth nightly for 30 days. 30 capsule 0    furosemide (LASIX) 40 MG tablet Take 1 tablet by mouth daily 30 tablet 6    potassium chloride (KLOR-CON M) 20 MEQ extended release tablet Take 1 tablet by mouth daily 30 tablet 6    DULoxetine (CYMBALTA) 30 MG extended release capsule Take 1 capsule by mouth daily 30 capsule 3    Respiratory Therapy Supplies (NEBULIZER) DARINEL Used to give yourself breathing treatment 1 Device 0    ipratropium-albuterol (DUONEB) 0.5-2.5 (3) MG/3ML SOLN nebulizer solution Inhale 3 mLs into the lungs every 4 hours as needed for Shortness of Breath (wheezing coughing) 360 mL 5    ondansetron (ZOFRAN ODT) 4 MG disintegrating tablet Take 1 tablet by mouth 4 times daily as needed for Nausea or Vomiting 60 tablet 0    carvedilol (COREG) 6.25 MG tablet Take 1.5 tablets by mouth 2 times daily 180 tablet 3    amLODIPine (NORVASC) 5 MG tablet TAKE ONE TABLET BY MOUTH DAILY 90 tablet 3    warfarin (COUMADIN) 5 MG tablet TAKE 1 AND 1/2 TABLET BY MOUTH ON MONDAY, WEDNESDAY, AND FRIDAY AND TAKE ONE TABLET BY MOUTH DAILY ON ALL OTHER DAYS 120 tablet 5    diclofenac sodium (VOLTAREN) 1 % GEL Apply 2 g topically 4 times daily 1 Tube 5    lisinopril (PRINIVIL;ZESTRIL) 20 MG tablet Take 1 tablet by mouth daily 180 tablet 1    albuterol sulfate HFA (PROAIR HFA) 108 (90 Base) MCG/ACT inhaler Inhale 2 puffs into the lungs every 6 hours as needed for Wheezing 1 Inhaler 5    naloxone 4 MG/0.1ML LIQD nasal spray 1 spray by Nasal route as needed for Opioid Reversal 1 each 5         Scheduled Meds:   Continuous Infusions:  PRN Meds:    Allergies:    Allergies   Allergen Reactions    Levaquin [Levofloxacin In D5w] Nausea And Vomiting    Digoxin Other (See Comments)     Pt states had to go er due to it affected her heart in a bad way-can't recall what type of reaction       REVIEW OF SYSTEMS:   History obtained from chart review    Review of Systems   Unable to perform ROS: Intubated     PHYSICAL EXAM:   Vitals: /73   Pulse 100   Temp 99.2 °F (37.3 °C) (Axillary)   Resp 22   Ht 5' 3\" (1.6 m)   Wt 200 lb 9.9 oz (91 kg)   LMP  (LMP Unknown)   SpO2 99%   BMI 35.54 kg/m²     I/O:      Intake/Output Summary (Last 24 hours) at 2/19/2021 2350  Last data filed at 2/19/2021 2155  Gross per 24 hour   Intake --   Output 80 ml   Net -80 ml     No intake/output data recorded. I/O last 3 completed shifts:  In: -   Out: 80 [Urine:80]    Physical Examination:     Physical Exam  Constitutional:       General: She is not in acute distress. Appearance: She is not ill-appearing. Comments: Intubated   HENT:      Head: Normocephalic and atraumatic. Eyes:      General: No scleral icterus. Conjunctiva/sclera: Conjunctivae normal.      Pupils: Pupils are equal, round, and reactive to light. Cardiovascular:      Rate and Rhythm: Normal rate. Rhythm irregular. Pulses: Normal pulses. Heart sounds: No murmur. No friction rub. No gallop. Pulmonary:      Effort: No respiratory distress. Breath sounds: No wheezing, rhonchi or rales. Comments: Mechanical vent sounds; transmitted  Abdominal:      General: Bowel sounds are normal. There is no distension. Palpations: Abdomen is soft. Tenderness: There is no abdominal tenderness. There is no guarding. Musculoskeletal:      Right lower leg: No edema. Left lower leg: No edema. Neurological:      Comments: Withdraws to pain in all extremities. Patellar reflex intact bilaterally. Unresponsive.  PERRL       Access:   -Central Access Day #: N/A                                  -Peripheral Access Day#:1  -Arterial line Day#:N/A                                  Martins Day#:1  NGT Day#: 1                                            ETT Day#:1  Vent Settings: Vent Mode: AC/PRVC Rate Set: 16 bmp/Vt Ordered: 400 mL/ /FiO2 : 50 %  ABG: No results for input(s): PHART, WJL2KAL, PO2ART in the last 72 hours. DATA:   Labs:  CBC:   Recent Labs     02/19/21  1450   WBC 18.2*   HGB 15.5   HCT 46.6          BMP:   Recent Labs     02/19/21  1545   *   K 3.8   CL 95*   CO2 29   BUN 26*   CREATININE 0.6   GLUCOSE 193*     LFT's:   Recent Labs     02/19/21  1545   AST 18   ALT 23   BILITOT 1.3*   ALKPHOS 79     Troponin:   Recent Labs     02/19/21  1450   TROPONINI <0.01     BNP:No results for input(s): BNP in the last 72 hours. ABGs: No results for input(s): PHART, CRB6QMX, PO2ART in the last 72 hours. INR:   Recent Labs     02/19/21  1450   INR 1.80*       U/A:  Recent Labs     02/19/21  1526   COLORU DK YELLOW   PHUR 7.0  7.0   WBCUA 1   RBCUA 7*   CLARITYU Clear   SPECGRAV 1.026   LEUKOCYTESUR Negative   UROBILINOGEN 1.0   BILIRUBINUR Negative   BLOODU Negative   GLUCOSEU 250*       CT HEAD WO CONTRAST    (Results Pending)       ASSESSMENT AND PLAN:   Bri Toney is a 80 y.o. female, who was admitted for large IPH with intraventricular hemorrhage. Hemorrhagic Stroke: Large IPH in R frontal lobe with ventricular extension in setting of warfarin use. Reversed with vitamin K. MLS of 9 mm. Poor prognosis. LKW unclear. Discussed case with patient's granddaughter, and she wants to see patient before formally discussing DNR-CC. Patient will be DNR-CCA. - Neurosurgery Consulted, appreciate recs:  - SBP < 160; PRN hydralazine   - hold AC/antiplatelet, SCDs  - Seizure ppx with Keppra 1000 mg BID  - CTH in AM  - palliative care consult    Acute Respiratory Failure: intubated due to neurologic insult/airway protection. - ventilator settings: Vent Mode: AC/PRVC Rate Set: 16 bmp/Vt Ordered: 400 mL/ /FiO2 : 50 %/PEEP 5  - sedation: propofol    Leukocytosis: likely reactive.    - monitor CBC    Chronic Conditions:  Atrial Fibrillation: on warfarin  - hold warfarin  - telemetry  - rate control as needed, follow INR    COPD:  - patient intubated  - hold home meds    CAD:  - not on medications    HTN:  - held amlodipine, lisinopril   - PRN meds as needed, pressures well-controlled at this time. Mood Disorder:  - held cymbalta    Code Status:DNR-CCA  FEN: Diet NPO Effective Now  PPX:  SCDs  DISPO: ICU    This patient will be staffed and discussed with Dr. Rosa Neither.    -----------------------------  Artist Alpers, MD  PGY-1, Internal Medicine  Contact via [address] [place] [address]  2/19/2021  11:50 PM

## 2021-02-20 NOTE — PROGRESS NOTES
[facility] [address] [place] [address] is to move pt to our [facility] this pm at 1845 per [facility]. Please call with questions or concerns.     Thank you for this referral.  Dawood Nunez RN  [phone]

## 2021-02-20 NOTE — PROGRESS NOTES
Patient's daughter and granddaughter arrived in ED Lobby, wanting to visit patient. Explained to them the visitor policy and that visitor hours begin at 0900. Patient's daughter tearful, but understanding. Will return at 0900, with hopes of granddaughter arriving from [facility] and having a discussion with MDs and palliative care.

## 2021-02-20 NOTE — ED NOTES
Report given to Shubham Gomez, nurse at [facility] receiving pt.  transport to be here at 2030 to take pt to [address] [facility].   Sascha Owusu RN  02/19/21 49 Randee Rodriguez RN  02/19/21 2016

## 2021-02-20 NOTE — ED NOTES
Talked to Wei Jones DTR that lives with patient, she states patient was a little confused last night and then lethargic today. States she has not had any falls or injury. States she sits in a recliner most of the day and does not get up much and does not ambulate.        Gayle Carrasquillo RN  02/19/21 1919

## 2021-02-20 NOTE — PROGRESS NOTES
Residents in to examine patient. Granddaughter, Moe Canchola was called and was given an update. She is in [place] at this time and is travelling to [place] on an overnight flight. Explained to her the visiting policies for the morning. Will update her with any changes overnight.

## 2021-02-20 NOTE — PROGRESS NOTES
4 Eyes Admission Assessment     I agree as the admission nurse that 2 RN's have performed a thorough Head to Toe Skin Assessment on the patient. ALL assessment sites listed below have been assessed on admission. Areas assessed by both nurses:   [x]   Head, Face, and Ears   [x]   Shoulders, Back, and Chest  [x]   Arms, Elbows, and Hands   [x]   Coccyx, Sacrum, and Ischium  [x]   Legs, Feet, and Heels        Does the Patient have Skin Breakdown? Yes- red, moist rash under bilateral breast folds. Cleansed and  Interdry applied.   Williams Prevention initiated:  YES   Wound Care Orders initiated: NA      [facility] nurse consulted for Pressure Injury (Stage 3,4, Unstageable, DTI, NWPT, and Complex wounds) or Williams score 18 or lower:  NA      Nurse 1 eSignature: Wild Osborne RN    **SHARE this note so that the co-signing nurse is able to place an eSignature**    Nurse 2 eSignature: Electronically signed by Ondina Lynn RN on 2/20/21 at 2:36 AM EST

## 2021-02-20 NOTE — PROGRESS NOTES
Pt remains unresponsive except to pain. No eye opening. Gag present. Lungs clear. Martins adequate. Propofol for sedation. Spoke with granddaughter on phone, her plane lands around [place] and she will be here after.

## 2021-02-20 NOTE — CONSULTS
Neurosurgery Consult Note    Reason for Consult:  Requesting Provider:    Subjective:  CHIEF COMPLAINT / HPI:  Jacek Scott is a 80 y.o. female patient being seen for complaints of AMS    Patient unable to provide History - Create from Chart Review of admitting H and Leora Espinoza is a 80 y.o. female w/PMHx of COPD, CAD, HTN, and atrial fibrillation on warfarin who presented with AMS to [facility] and found to have a large R frontal intraparenchymal hemorrhage with intraventricular extension.      At time of presentation pt was non-responsive to vocal stimuli, but withdrew limbs to pain.  Pt was found by family and last known normal is unknown. History from chart review. Initial workup including head CT showed a large intraparenchymal hemorrhage within the right frontal lobe. Patient was given 10 mg IV vitamin K for warfarin reversal. INR 1.8. Patient was also given 10 mg IV labetalol for SBP above 140. Due to low GCS and concern for airway protection patient was intubated. Neurosurgery evaluated patient and concluded that she was not a surgical candidate and that prognosis was grim.      Patient was transferred to [facility] in the setting of code status discussion, as POA, her granddaughter, was currently out of state. Pt was placed in ICU and has remained stable since admission. Vent settings are 50%/12/400mL/5. Pt at time of initial assessment was unresponsive to both vocal and physical stimuli, on sedation of Propofol 20mcg/kg/min.  Discussed prognosis with grand-daughter and decided that patient would be DNR-CCA until she is able to see the patient tomorrow to decide upon hospice.       Past Medical History:   Diagnosis Date    A fib     Arthritis     [address] obstructive [facility] disease) [address]     Coronary artery disease     Femur fracture ([facility] 01/2016    left    Fibromyalgia     GERD (gastroesophageal reflux disease)     History of shingles     Hypertension     PONV (postoperative nausea and vomiting)     Rotator cuff disorder     left     Ulcer of gastroesophageal junction 2006     Past Surgical History:   Procedure Laterality Date    ACROMIOPLASTY Left 4/27/15    Dr Saint Jacquet  age 21   [address] COLONOSCOPY  05.01.03    COLONOSCOPY  02/2017    no need for further colonoscopy per Dr. Kel Grijalva N/A 11/1/2018    Iron Kurk performed by Shellie Morse MD at [address] Left 01/2016     Left intertrochanteric and shaft femur comminuted fracture, status post Gamma nail and cables.     [place] ESOPHAGOGASTRODUODENOSCOPY US SCOPE W/ADJ STRXRS N/A 11/1/2018    ESOPHAGEAL ENDOSCOPIC ULTRASOUND EXAM performed by Shellie Morse MD at [address] [facility]      right     UPPER GASTROINTESTINAL ENDOSCOPY N/A 11/1/2018    EGD BIOPSY performed by Shellie Morse MD at [address] [levofloxacin in d5w] and Digoxin    Current Facility-Administered Medications:     levETIRAcetam (KEPPRA) 1,000 mg in sodium chloride 0.9 % 100 mL IVPB, 1,000 mg, Intravenous, Q12H, Artist Alpers, MD, Stopped at 02/20/21 0050    labetalol (NORMODYNE;TRANDATE) injection 5 mg, 5 mg, Intravenous, Q6H PRN, Artist Alpers, MD    propofol injection, 10 mcg/kg/min, Intravenous, Titrated, Artist Alpers, MD, Last Rate: 10.9 mL/hr at 02/20/21 0050, 20 mcg/kg/min at 02/20/21 0050    glucose (GLUTOSE) 40 % oral gel 15 g, 15 g, Oral, PRN, Dago Pa MD    dextrose 50 % IV solution, 12.5 g, Intravenous, PRN, Dago Pa MD    glucagon (rDNA) injection 1 mg, 1 mg, Intramuscular, PRN, Dago Pa MD    dextrose 5 % solution, 100 mL/hr, Intravenous, PRN, Dago Pa MD    insulin lispro (1 Unit Dial) 0-6 Units, 0-6 Units, Subcutaneous, Q6H, Dago Pa MD, 1 Units at 02/20/21 0901    sodium chloride flush 0.9 % injection 10 mL, 10 mL, Intravenous, 2 times per day, Thierry Alvarez MD    sodium chloride flush 0.9 % injection 10 mL, 10 mL, Intravenous, PRN, Thierry Alvarez MD    acetaminophen (TYLENOL) tablet 650 mg, 650 mg, Oral, Q4H PRN, Thierry Alvarez MD    promethazine (PHENERGAN) tablet 12.5 mg, 12.5 mg, Oral, Q6H PRN **OR** ondansetron (ZOFRAN) injection 4 mg, 4 mg, Intravenous, Q6H PRN, Thierry Alvarez MD    hydrALAZINE (APRESOLINE) injection 5 mg, 5 mg, Intravenous, Q6H PRN, Thierry Alvarez MD  Social History     Socioeconomic History    Marital status:      Spouse name: Not on file    Number of children: 4    Years of education: Not on file    Highest education level: Not on file   Occupational History    Not on file   Social Needs    Financial resource strain: Not on file    Food insecurity     Worry: Not on file     Inability: Not on file   [employer] needs     Medical: Not on file     Non-medical: Not on file   Tobacco Use    Smoking status: Former Smoker     Packs/day: 1.00     Years: 20.00     Pack years: 20.00     Types: Cigarettes     Quit date: 1980     Years since quittin.1    Smokeless tobacco: Never Used   Substance and Sexual Activity    Alcohol use: No     Alcohol/week: 0.0 standard drinks    Drug use: No    Sexual activity: Not Currently   Lifestyle    Physical activity     Days per week: Not on file     Minutes per session: Not on file    Stress: Not on file   Relationships    Social connections     Talks on phone: Not on file     Gets together: Not on file     Attends Sabianist service: Not on file     Active member of club or organization: Not on file     Attends meetings of clubs or organizations: Not on file     Relationship status: Not on file    Intimate partner violence     Fear of current or ex partner: Not on file     Emotionally abused: Not on file     Physically abused: Not on file     Forced sexual activity: Not on file   Other Topics Concern    Not on file   Social History Narrative    Not on file     Family History   Problem Relation Age of Onset    Stroke Mother     COPD Father     Coronary Art Dis Father     Other Brother         AAA rupture    Other Brother         cerebral hemorrhage    Heart Attack Son         cerebral hemorrhage as well    Cancer Sister         x2 sisters     Cancer Sister         ovarian        ROS: Complete 10 point ROS was obtained with positives in HPI, otherwise:    Unable to obtain - Patient intubated and GCS 4    PHYSICAL EXAMINATION:  BP (!) 155/83   Pulse 98   Temp 98 °F (36.7 °C) (Oral)   Resp 19   Ht 5' 3\" (1.6 m)   Wt 200 lb 9.9 oz (91 kg)   LMP  (LMP Unknown)   SpO2 99%   BMI 35.54 kg/m²     GCS E1, VI, M2   Patient resting comfortably   Withdraws to pain   Pupils 2 mm and minimally reactive      LABORATORY DATA:   CBC with Differential:    Lab Results   Component Value Date    WBC 17.9 02/20/2021    RBC 4.59 02/20/2021    HGB 13.9 02/20/2021    HCT 41.9 02/20/2021     02/20/2021    MCV 91.1 02/20/2021    MCH 30.2 02/20/2021    MCHC 33.1 02/20/2021    RDW 14.5 02/20/2021    SEGSPCT 67.1 12/16/2011    LYMPHOPCT 8.9 02/19/2021    MONOPCT 6.6 02/19/2021    EOSPCT 1.8 12/16/2011    BASOPCT 0.4 02/19/2021    MONOSABS 1.2 02/19/2021    LYMPHSABS 1.6 02/19/2021    EOSABS 0.0 02/19/2021    BASOSABS 0.1 02/19/2021    DIFFTYPE Auto-K 12/16/2011        IMAGING STUDIES:   CT of the head     I have personally reviewed the CT head and compared to prior imaging   Agree with reports     Large right frontal ICH with extension into the Ventricular System resulting in right to left Midline Shift      IMPRESSION/PLAN:  Active Problems:   ICH with IVH    80year old with ICH/IVH GCS 4    Stable CT head   Stable but poor Exam      Recommend  1. Admit -ICU  2. Q1 hour neurochecks  3. Keppra 1000 BID  4. Pecid  5. Correct and coagulopathy  6. SBP <160  7. HOB 30 degree with neck midline  8.  Given the size of ICH, POOR exam and patients age - no surgery recommended   Recommend supportive care   Recommend family conference and end of life decision making          Thank you again for this consultation.     Michaela Lipoma  2/20/2021

## 2021-02-25 ENCOUNTER — ANTI-COAG VISIT (OUTPATIENT)
Dept: PHARMACY | Age: 86
End: 2021-02-25

## 2021-02-25 DIAGNOSIS — I48.91 ATRIAL FIBRILLATION WITH RAPID VENTRICULAR RESPONSE (HCC): ICD-10-CM

## 2021-03-15 NOTE — DISCHARGE SUMMARY
Hospital Medicine Discharge Summary    Patient ID: Jacek Scott      Patient's PCP: Maria Eugenia Fowler MD    Admit Date: 2/19/2021     Discharge Date: 2/20/2021     Admitting Physician: Jessica Shaw MD     Discharge Physician: Ivonne Cantrell MD     Discharge Diagnoses: Active Hospital Problems    Diagnosis Date Noted    Brain bleed [facility]) [I61.9] 02/19/2021       The patient was seen and examined on day of discharge and this discharge summary is in conjunction with any daily progress note from day of discharge. Condition at discharge - stable    Hospital Course: Patient is a 40-year-old female with past medical history of COPD, CAD, hypertension, atrial fibrillation on Coumadin who presented to hospital for altered mental status from [facility] [place], on further evaluation patient was found to have intracranial hemorrhage not amenable to neurosurgical intervention.     Assessment  Acute encephalopathy likely secondary to intracranial hemorrhage, Hemorrhagic stroke  Atrial fibrillation on Coumadin   Acute hypoxic respiratory failure  Requiring intubation  COPD  Hypertension  CAD    During hospital course patient's family opted for hospice care services, patient's granddaughter is POA. Patient was discharged to [facility] [place], operated by [facility]. Family decided to withdraw care and palliatively extubate the patient at bedside. Exam:     /60   Pulse 105   Temp 98 °F (36.7 °C) (Oral)   Resp 28   Ht 5' 3\" (1.6 m)   Wt 200 lb 9.9 oz (91 kg)   LMP  (LMP Unknown)   SpO2 90%   BMI 35.54 kg/m²        General appearance: Intubated and sedated at time of my physical examination  Respiratory:  Normal respiratory effort. Clear to auscultation, bilaterally without Rales/Wheezes/Rhonchi. Cardiovascular: irregular rate and rhythm with normal S1/S2 without murmurs or rubs  Abdomen: Soft, non-tender, non-distended, normal bowel sounds.   Musculoskeletal: No cyanosis or edema bilaterally  Neurologic: Intubated and sedated  Psychiatric: Intubated and sedated  Peripheral Pulses: +2 palpable, equal bilaterally     Consults:     IP CONSULT TO NEUROSURGERY  IP CONSULT TO PHARMACY  PHARMACY TO CHANGE BASE FLUIDS  IP CONSULT TO PALLIATIVE CARE  IP CONSULT TO HOSPICE      Code Status:  Prior    Activity: activity as tolerated    Labs: For convenience and continuity at follow-up the following most recent labs are provided:      CBC:    Lab Results   Component Value Date    WBC 17.9 02/20/2021    HGB 13.9 02/20/2021    HCT 41.9 02/20/2021     02/20/2021       Renal:    Lab Results   Component Value Date     02/20/2021    K 3.8 02/20/2021    K 4.1 12/22/2020    CL 97 02/20/2021    CO2 26 02/20/2021    BUN 36 02/20/2021    CREATININE 0.9 02/20/2021    CALCIUM 9.0 02/20/2021    PHOS 2.1 04/19/2016       Discharge Medications:     Discharge Medication List as of 2/20/2021  6:53 PM           Details   oxyCODONE-acetaminophen (PERCOCET) 7.5-325 MG per tablet Take 1 tablet by mouth every 8 hours as needed for Pain for up to 30 days. Intended supply: 30 days, Disp-90 tablet, R-0Normal      gabapentin (NEURONTIN) 100 MG capsule Take 1 capsule by mouth nightly for 30 days. , Disp-30 capsule, R-0Normal      furosemide (LASIX) 40 MG tablet Take 1 tablet by mouth daily, Disp-30 tablet, R-6Normal      potassium chloride (KLOR-CON M) 20 MEQ extended release tablet Take 1 tablet by mouth daily, Disp-30 tablet, R-6Normal      DULoxetine (CYMBALTA) 30 MG extended release capsule Take 1 capsule by mouth daily, Disp-30 capsule, R-3Normal      Respiratory Therapy Supplies (NEBULIZER) DARINEL Disp-1 Device, R-0, NormalUsed to give yourself breathing treatment      ipratropium-albuterol (DUONEB) 0.5-2.5 (3) MG/3ML SOLN nebulizer solution Inhale 3 mLs into the lungs every 4 hours as needed for Shortness of Breath (wheezing coughing), Disp-360 mL, R-5Normal      ondansetron (ZOFRAN ODT) 4 MG disintegrating tablet Take 1 tablet by mouth 4 times daily as needed for Nausea or Vomiting, Disp-60 tablet, R-0Normal      carvedilol (COREG) 6.25 MG tablet Take 1.5 tablets by mouth 2 times daily, Disp-180 tablet,R-3Normal      amLODIPine (NORVASC) 5 MG tablet TAKE ONE TABLET BY MOUTH DAILY, Disp-90 tablet,R-3Normal      warfarin (COUMADIN) 5 MG tablet TAKE 1 AND 1/2 TABLET BY MOUTH ON MONDAY, WEDNESDAY, AND FRIDAY AND TAKE ONE TABLET BY MOUTH DAILY ON ALL OTHER DAYS, Disp-120 tablet,R-5Normal      diclofenac sodium (VOLTAREN) 1 % GEL Apply 2 g topically 4 times daily, Topical, 4 TIMES DAILY Starting Tue 6/2/2020, Disp-1 Tube, R-5, Normal      lisinopril (PRINIVIL;ZESTRIL) 20 MG tablet Take 1 tablet by mouth daily, Disp-180 tablet, R-1Normal      albuterol sulfate HFA (PROAIR HFA) 108 (90 Base) MCG/ACT inhaler Inhale 2 puffs into the lungs every 6 hours as needed for Wheezing, Disp-1 Inhaler, R-5Please fill with what is on formulary>>proair, proventil or ventolinPrint      naloxone 4 MG/0.1ML LIQD nasal spray 1 spray by Nasal route as needed for Opioid Reversal, Disp-1 each, R-5Normal             Time Spent on discharge is more than 30 mints in the examination, evaluation, counseling and review of medications and discharge plan. Signed:    Vira Fernandez MD   3/15/2021      Thank you Neisha Mendez MD for the opportunity to be involved in this patient's care. If you have any questions or concerns please feel free to contact me at [phone].

## 2023-03-21 NOTE — PROGRESS NOTES
Called daughter Aurelia back at number [phone], daughter stated patient is in the bathroom and will call right back in 10 minutes.   Cuca Jackie  1935  [phone]      HISTORY OF PRESENT ILLNESS:  Ms. Pancho Webster is a 80 y.o. female returns for a follow up visit for pain management  She has a diagnosis of   1. Chronic pain syndrome    2. Fibromyalgia    3. Other spondylosis with radiculopathy, lumbar region    4. Lumbar radiculopathy    5. Polyarthropathy, multiple sites    6. Primary osteoarthritis of both hips    7. Neuropathy    8. Chronic obstructive pulmonary disease, unspecified COPD type ([facility] [address]    9. Morbid obesity due to excess calories ([facility] [address]    10. Depression, unspecified depression type    11. Primary insomnia    12. Therapeutic opioid induced constipation      On the Patients Pain Assessment form:  She complains of pain in the both knee(s), both leg(s): entire area, bilateral lower back and both shoulder(s): entire area She rates the pain 9/10 and describes it as aching, shooting, numbness, pins and needles. Current treatment regimen has helped relieve about 10% of the pain. She denies any side effects from the current pain regimen. Patient reports that since the last follow up visit the physical functioning is unchanged, family/social relationships are unchanged, mood is unchanged sleep patterns are unchanged, and that the overall functioning is unchanged. Patient denies misusing/abusing her narcotic pain medications or using any illegal drugs. There are No indicators for possible drug abuse, addiction or diversion problems. Upon obtaining medical history from Ms. Pancho Webster states that pain is somewhat manageable on current pain therapy. She is a former patient of Dr. Erick Lanier. reports her pain is bothersome mainly to the lower back radiating to bilateral lower extremities, shoulders, bilateral knees hand/feet. Admits to history of multiple falls, recalls one in particular over 2 years ago after slipping on ice at her home led to a fracture of the left intertrochanteric, shaft femur fracture.   She had surgery with gamma nail and by mouth daily EXCEPT 7.5mg every Monday, Wednesday and Friday or as directed by [facility] [phone] tablet 5    naloxone 4 MG/0.1ML LIQD nasal spray 1 spray by Nasal route as needed for Opioid Reversal 1 each 5    oxyCODONE-acetaminophen (PERCOCET) 7.5-325 MG per tablet Take 1 tablet by mouth every 8 hours as needed for Pain for up to 30 days. Intended supply: 30 days 90 tablet 0     No facility-administered medications prior to visit. SOCIAL/FAMILY/PAST MEDICAL HISTORY: Ms. Jessica Kendrick, family and past medical history was reviewed. REVIEW OF SYSTEMS:    Respiratory: Negative for apnea, chest tightness and shortness of breath or change in baseline breathing. Gastrointestinal: Negative for nausea, vomiting, abdominal pain, diarrhea, constipation, blood in stool and abdominal distention. PHYSICAL EXAM:   Nursing note and vitals reviewed. /86   Pulse 88   Temp 97.3 °F (36.3 °C) (Oral)   LMP  (LMP Unknown)   SpO2 97%   Constitutional: She appears well-developed and well-nourished. No acute distress. Skin: Skin is warm and dry, good turgor. No rash noted. She is not diaphoretic. Cardiovascular: Normal rate, irregular rhythm, normal heart sounds, and does not have murmur. Pulmonary/Chest: Effort normal. No respiratory distress. She does have wheezes in the lung fields. SOB on exertion, stable at rest. Mild cough noted She has no rales. Neurological/Psychiatric:She is alert and oriented to person, place, and time. Coordination is  normal. Slow steady gait with the aid of a cane Her mood isDepressed and affect is Flat/blunted . MSK: Tenderness noted to the cervical spine with palpation, Guarded pain with 40 degree flexion, 5 degree extension, palpation. Negative for swelling or deformity to the lumbar spine. +Bilateral hip pain. Mild swelling to the knees, ROM. IMPRESSION:   1. Chronic pain syndrome    2. Fibromyalgia    3.  Other spondylosis with days. Intended supply: 30 days 90 tablet 0    DULoxetine (CYMBALTA) 30 MG extended release capsule Take 1 capsule by mouth daily 30 capsule 3    gabapentin (NEURONTIN) 100 MG capsule Take 1 capsule by mouth nightly for 30 days. 30 capsule 0    diclofenac sodium (VOLTAREN) 1 % GEL Apply 2 g topically 4 times daily 1 Tube 5    ondansetron (ZOFRAN) 4 MG tablet Take 1 tablet by mouth 3 times daily as needed for Nausea or Vomiting 30 tablet 2    furosemide (LASIX) 40 MG tablet Take 40 mg by mouth daily      potassium citrate (UROCIT-K) 10 MEQ (1080 MG) extended release tablet Take 20 mEq by mouth daily With furosemide      lisinopril (PRINIVIL;ZESTRIL) 20 MG tablet Take 1 tablet by mouth daily 180 tablet 1    carvedilol (COREG) 6.25 MG tablet Take 1.5 tablets by mouth 2 times daily 180 tablet 1    amLODIPine (NORVASC) 5 MG tablet TAKE ONE TABLET BY MOUTH DAILY 90 tablet 1    albuterol sulfate HFA (PROAIR HFA) 108 (90 Base) MCG/ACT inhaler Inhale 2 puffs into the lungs every 6 hours as needed for Wheezing 1 Inhaler 5    warfarin (COUMADIN) 5 MG tablet Take 5mg by mouth daily EXCEPT 7.5mg every Monday, Wednesday and Friday or [facility] [phone] tablet 5    naloxone 4 MG/0.1ML LIQD nasal spray 1 spray by Nasal route as needed for Opioid Reversal 1 each 5     No current facility-administered medications for this visit. I will continue her current medication regimen  which is part of the above treatment schedule. It has been helping with Ms. Denis's chronic  medical problems which for this visit include:   Diagnoses of Chronic pain syndrome, Fibromyalgia, Other spondylosis with radiculopathy, lumbar region, Lumbar radiculopathy, Polyarthropathy, multiple sites, Primary osteoarthritis of both hips, Neuropathy, Chronic obstructive pulmonary disease, unspecified COPD type ([address] Morbid obesity due to excess calories ([address] Depression, unspecified depression type, Primary

## (undated) DEVICE — Device

## (undated) DEVICE — CONTAINER SPEC 480ML CLR POLYSTYR 10% NEUT BUFF FRMLN ZN

## (undated) DEVICE — ENDO CARRY-ON PROCEDURE KIT: Brand: ENDO CARRY-ON PROCEDURE KIT

## (undated) DEVICE — MOUTHPIECE ENDOSCP L CTRL OPN AND SIDE PORTS DISP

## (undated) DEVICE — FORCEPS BX 240CM 2.4MM L NDL RAD JAW 4 M00513334